# Patient Record
Sex: MALE | Race: WHITE | Employment: UNEMPLOYED | URBAN - METROPOLITAN AREA
[De-identification: names, ages, dates, MRNs, and addresses within clinical notes are randomized per-mention and may not be internally consistent; named-entity substitution may affect disease eponyms.]

---

## 2019-10-02 ENCOUNTER — OFFICE VISIT (OUTPATIENT)
Dept: ORTHOPEDIC SURGERY | Age: 49
End: 2019-10-02
Payer: COMMERCIAL

## 2019-10-02 VITALS
DIASTOLIC BLOOD PRESSURE: 89 MMHG | BODY MASS INDEX: 28.49 KG/M2 | WEIGHT: 199 LBS | HEIGHT: 70 IN | SYSTOLIC BLOOD PRESSURE: 129 MMHG | HEART RATE: 99 BPM

## 2019-10-02 DIAGNOSIS — M47.27 LUMBOSACRAL SPONDYLOSIS WITH RADICULOPATHY: Primary | ICD-10-CM

## 2019-10-02 DIAGNOSIS — S22.080A T12 COMPRESSION FRACTURE, INITIAL ENCOUNTER (HCC): ICD-10-CM

## 2019-10-02 PROCEDURE — G8427 DOCREV CUR MEDS BY ELIG CLIN: HCPCS | Performed by: PHYSICIAN ASSISTANT

## 2019-10-02 PROCEDURE — 4004F PT TOBACCO SCREEN RCVD TLK: CPT | Performed by: PHYSICIAN ASSISTANT

## 2019-10-02 PROCEDURE — 99203 OFFICE O/P NEW LOW 30 MIN: CPT | Performed by: PHYSICIAN ASSISTANT

## 2019-10-02 PROCEDURE — G8484 FLU IMMUNIZE NO ADMIN: HCPCS | Performed by: PHYSICIAN ASSISTANT

## 2019-10-02 PROCEDURE — G8419 CALC BMI OUT NRM PARAM NOF/U: HCPCS | Performed by: PHYSICIAN ASSISTANT

## 2019-10-14 ENCOUNTER — TELEPHONE (OUTPATIENT)
Dept: ORTHOPEDIC SURGERY | Age: 49
End: 2019-10-14

## 2019-10-14 DIAGNOSIS — M47.27 LUMBOSACRAL SPONDYLOSIS WITH RADICULOPATHY: Primary | ICD-10-CM

## 2020-08-18 ENCOUNTER — HOSPITAL ENCOUNTER (OUTPATIENT)
Dept: VASCULAR LAB | Age: 50
Discharge: HOME | End: 2020-08-18
Payer: COMMERCIAL

## 2020-08-18 PROCEDURE — 93971 EXTREMITY STUDY: CPT

## 2021-02-02 ENCOUNTER — OFFICE VISIT (OUTPATIENT)
Dept: PAIN MANAGEMENT | Age: 51
End: 2021-02-02
Payer: COMMERCIAL

## 2021-02-02 VITALS
DIASTOLIC BLOOD PRESSURE: 97 MMHG | WEIGHT: 225 LBS | TEMPERATURE: 97.8 F | HEIGHT: 70 IN | HEART RATE: 92 BPM | BODY MASS INDEX: 32.21 KG/M2 | OXYGEN SATURATION: 97 % | SYSTOLIC BLOOD PRESSURE: 152 MMHG | RESPIRATION RATE: 17 BRPM

## 2021-02-02 DIAGNOSIS — Z12.11 SCREEN FOR COLON CANCER: ICD-10-CM

## 2021-02-02 DIAGNOSIS — G89.29 CHRONIC BILATERAL LOW BACK PAIN WITH BILATERAL SCIATICA: ICD-10-CM

## 2021-02-02 DIAGNOSIS — Z51.81 ENCOUNTER FOR THERAPEUTIC DRUG MONITORING: ICD-10-CM

## 2021-02-02 DIAGNOSIS — M25.572 CHRONIC PAIN OF LEFT ANKLE: ICD-10-CM

## 2021-02-02 DIAGNOSIS — M54.41 CHRONIC BILATERAL LOW BACK PAIN WITH BILATERAL SCIATICA: ICD-10-CM

## 2021-02-02 DIAGNOSIS — M54.42 CHRONIC BILATERAL LOW BACK PAIN WITH BILATERAL SCIATICA: ICD-10-CM

## 2021-02-02 DIAGNOSIS — M54.2 CERVICALGIA: ICD-10-CM

## 2021-02-02 DIAGNOSIS — G89.29 CHRONIC PAIN OF LEFT ANKLE: ICD-10-CM

## 2021-02-02 DIAGNOSIS — G89.4 CHRONIC PAIN SYNDROME: ICD-10-CM

## 2021-02-02 DIAGNOSIS — S22.080S COMPRESSION FRACTURE OF T12 VERTEBRA, SEQUELA: ICD-10-CM

## 2021-02-02 DIAGNOSIS — M47.817 LUMBOSACRAL SPONDYLOSIS WITHOUT MYELOPATHY: ICD-10-CM

## 2021-02-02 LAB
A/G RATIO: 1.9 (ref 1.1–2.2)
ALBUMIN SERPL-MCNC: 5 G/DL (ref 3.4–5)
ALP BLD-CCNC: 75 U/L (ref 40–129)
ALT SERPL-CCNC: 45 U/L (ref 10–40)
ANION GAP SERPL CALCULATED.3IONS-SCNC: 13 MMOL/L (ref 3–16)
AST SERPL-CCNC: 33 U/L (ref 15–37)
BASOPHILS ABSOLUTE: 0.1 K/UL (ref 0–0.2)
BASOPHILS RELATIVE PERCENT: 0.7 %
BILIRUB SERPL-MCNC: 0.3 MG/DL (ref 0–1)
BUN BLDV-MCNC: 16 MG/DL (ref 7–20)
CALCIUM SERPL-MCNC: 9.9 MG/DL (ref 8.3–10.6)
CHLORIDE BLD-SCNC: 98 MMOL/L (ref 99–110)
CHOLESTEROL, TOTAL: 251 MG/DL (ref 0–199)
CO2: 24 MMOL/L (ref 21–32)
CREAT SERPL-MCNC: 0.9 MG/DL (ref 0.9–1.3)
EOSINOPHILS ABSOLUTE: 0.2 K/UL (ref 0–0.6)
EOSINOPHILS RELATIVE PERCENT: 2.5 %
FOLATE: 10.96 NG/ML (ref 4.78–24.2)
GFR AFRICAN AMERICAN: >60
GFR NON-AFRICAN AMERICAN: >60
GLOBULIN: 2.6 G/DL
GLUCOSE BLD-MCNC: 102 MG/DL (ref 70–99)
HCT VFR BLD CALC: 49.1 % (ref 40.5–52.5)
HDLC SERPL-MCNC: 38 MG/DL (ref 40–60)
HEMOGLOBIN: 16.8 G/DL (ref 13.5–17.5)
LDL CHOLESTEROL CALCULATED: ABNORMAL MG/DL
LDL CHOLESTEROL DIRECT: 139 MG/DL
LYMPHOCYTES ABSOLUTE: 2.6 K/UL (ref 1–5.1)
LYMPHOCYTES RELATIVE PERCENT: 29.4 %
MCH RBC QN AUTO: 31.3 PG (ref 26–34)
MCHC RBC AUTO-ENTMCNC: 34.2 G/DL (ref 31–36)
MCV RBC AUTO: 91.4 FL (ref 80–100)
MONOCYTES ABSOLUTE: 0.7 K/UL (ref 0–1.3)
MONOCYTES RELATIVE PERCENT: 7.8 %
NEUTROPHILS ABSOLUTE: 5.3 K/UL (ref 1.7–7.7)
NEUTROPHILS RELATIVE PERCENT: 59.6 %
PDW BLD-RTO: 12.7 % (ref 12.4–15.4)
PLATELET # BLD: 208 K/UL (ref 135–450)
PMV BLD AUTO: 7.9 FL (ref 5–10.5)
POTASSIUM SERPL-SCNC: 4.3 MMOL/L (ref 3.5–5.1)
RBC # BLD: 5.38 M/UL (ref 4.2–5.9)
SODIUM BLD-SCNC: 135 MMOL/L (ref 136–145)
TOTAL PROTEIN: 7.6 G/DL (ref 6.4–8.2)
TRIGL SERPL-MCNC: 440 MG/DL (ref 0–150)
VITAMIN B-12: 529 PG/ML (ref 211–911)
VITAMIN D 25-HYDROXY: 22 NG/ML
VLDLC SERPL CALC-MCNC: ABNORMAL MG/DL
WBC # BLD: 8.9 K/UL (ref 4–11)

## 2021-02-02 PROCEDURE — G8427 DOCREV CUR MEDS BY ELIG CLIN: HCPCS | Performed by: NURSE PRACTITIONER

## 2021-02-02 PROCEDURE — 99244 OFF/OP CNSLTJ NEW/EST MOD 40: CPT | Performed by: NURSE PRACTITIONER

## 2021-02-02 PROCEDURE — G8484 FLU IMMUNIZE NO ADMIN: HCPCS | Performed by: NURSE PRACTITIONER

## 2021-02-02 PROCEDURE — G8417 CALC BMI ABV UP PARAM F/U: HCPCS | Performed by: NURSE PRACTITIONER

## 2021-02-02 RX ORDER — OXCARBAZEPINE 150 MG/1
150 TABLET, FILM COATED ORAL 2 TIMES DAILY
Qty: 60 TABLET | Refills: 3 | Status: SHIPPED | OUTPATIENT
Start: 2021-02-02 | End: 2021-03-02

## 2021-02-02 NOTE — PATIENT INSTRUCTIONS
Patient Education        Back Stretches: Exercises  Introduction  Here are some examples of exercises for stretching your back. Start each exercise slowly. Ease off the exercise if you start to have pain. Your doctor or physical therapist will tell you when you can start these exercises and which ones will work best for you. How to do the exercises  Overhead stretch   1. Stand comfortably with your feet shoulder-width apart. 2. Looking straight ahead, raise both arms over your head and reach toward the ceiling. Do not allow your head to tilt back. 3. Hold for 15 to 30 seconds, then lower your arms to your sides. 4. Repeat 2 to 4 times. Side stretch   1. Stand comfortably with your feet shoulder-width apart. 2. Raise one arm over your head, and then lean to the other side. 3. Slide your hand down your leg as you let the weight of your arm gently stretch your side muscles. Hold for 15 to 30 seconds. 4. Repeat 2 to 4 times on each side. Press-up   1. Lie on your stomach, supporting your body with your forearms. 2. Press your elbows down into the floor to raise your upper back. As you do this, relax your stomach muscles and allow your back to arch without using your back muscles. As your press up, do not let your hips or pelvis come off the floor. 3. Hold for 15 to 30 seconds, then relax. 4. Repeat 2 to 4 times. Relax and rest   1. Lie on your back with a rolled towel under your neck and a pillow under your knees. Extend your arms comfortably to your sides. 2. Relax and breathe normally. 3. Remain in this position for about 10 minutes. 4. If you can, do this 2 or 3 times each day. Follow-up care is a key part of your treatment and safety. Be sure to make and go to all appointments, and call your doctor if you are having problems. It's also a good idea to know your test results and keep a list of the medicines you take. Where can you learn more? Shoulder blade squeeze   1. While standing, squeeze your shoulder blades together. 2. Do not raise your shoulders up as you are squeezing. 3. Hold for 6 seconds. 4. Repeat 8 to 12 times. Shoulder rolls   1. Sit comfortably with your feet shoulder-width apart. You can also do this exercise standing up. 2. Roll your shoulders up, then back, and then down in a smooth, circular motion. 3. Repeat 2 to 4 times. Follow-up care is a key part of your treatment and safety. Be sure to make and go to all appointments, and call your doctor if you are having problems. It's also a good idea to know your test results and keep a list of the medicines you take. Where can you learn more? Go to [email or web address]://[email or web address].[employer]. org and sign in to your [employer] account. Enter M991 in the [employer] box to learn more about \"Neck Arthritis: Exercises. \"     If you do not have an account, please click on the \"Sign Up Now\" link. Current as of: March 2, 2020               Content Version: 12.6  © 9058-7341 [employer]. Care instructions adapted under license by [facility] ([facility]). If you have questions about a medical condition or this instruction, always ask your healthcare professional. [address] any warranty or liability for your use of this information.
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
## 2021-02-03 LAB
ESTIMATED AVERAGE GLUCOSE: 114 MG/DL
HBA1C MFR BLD: 5.6 %

## 2021-02-08 ENCOUNTER — TELEPHONE (OUTPATIENT)
Dept: PAIN MANAGEMENT | Age: 51
End: 2021-02-08

## 2021-02-08 DIAGNOSIS — G89.29 CHRONIC PAIN OF LEFT ANKLE: ICD-10-CM

## 2021-02-08 DIAGNOSIS — M54.2 CERVICALGIA: ICD-10-CM

## 2021-02-08 DIAGNOSIS — M54.41 CHRONIC BILATERAL LOW BACK PAIN WITH BILATERAL SCIATICA: ICD-10-CM

## 2021-02-08 DIAGNOSIS — S22.080S COMPRESSION FRACTURE OF T12 VERTEBRA, SEQUELA: ICD-10-CM

## 2021-02-08 DIAGNOSIS — G89.29 CHRONIC BILATERAL LOW BACK PAIN WITH BILATERAL SCIATICA: ICD-10-CM

## 2021-02-08 DIAGNOSIS — G89.4 CHRONIC PAIN SYNDROME: Primary | ICD-10-CM

## 2021-02-08 DIAGNOSIS — M54.42 CHRONIC BILATERAL LOW BACK PAIN WITH BILATERAL SCIATICA: ICD-10-CM

## 2021-02-08 DIAGNOSIS — M47.817 LUMBOSACRAL SPONDYLOSIS WITHOUT MYELOPATHY: ICD-10-CM

## 2021-02-08 DIAGNOSIS — M25.572 CHRONIC PAIN OF LEFT ANKLE: ICD-10-CM

## 2021-02-08 RX ORDER — PREGABALIN 100 MG/1
100 CAPSULE ORAL NIGHTLY
Qty: 30 CAPSULE | Refills: 1 | Status: SHIPPED | OUTPATIENT
Start: 2021-02-08 | End: 2021-05-04 | Stop reason: SDUPTHER

## 2021-02-08 NOTE — TELEPHONE ENCOUNTER
Patient states RX OXcarbazepine (TRILEPTAL) 150 MG he received at his last visit has been causing severe headaches x 5 days.  Pl's advise

## 2021-02-08 NOTE — TELEPHONE ENCOUNTER
INFORMED PATIENT TO STOP THE TRILEPTEL AND TOLD HIM THAT I SENT DENYS TO THE PHARMACY FOR HIM TO TRY

## 2021-02-11 ENCOUNTER — TELEPHONE (OUTPATIENT)
Dept: PAIN MANAGEMENT | Age: 51
End: 2021-02-11

## 2021-02-11 DIAGNOSIS — S22.080S COMPRESSION FRACTURE OF T12 VERTEBRA, SEQUELA: ICD-10-CM

## 2021-02-11 DIAGNOSIS — M54.42 CHRONIC BILATERAL LOW BACK PAIN WITH BILATERAL SCIATICA: ICD-10-CM

## 2021-02-11 DIAGNOSIS — M54.41 CHRONIC BILATERAL LOW BACK PAIN WITH BILATERAL SCIATICA: ICD-10-CM

## 2021-02-11 DIAGNOSIS — M47.817 LUMBOSACRAL SPONDYLOSIS WITHOUT MYELOPATHY: ICD-10-CM

## 2021-02-11 DIAGNOSIS — G89.29 CHRONIC BILATERAL LOW BACK PAIN WITH BILATERAL SCIATICA: ICD-10-CM

## 2021-02-11 DIAGNOSIS — G89.4 CHRONIC PAIN SYNDROME: ICD-10-CM

## 2021-02-12 ENCOUNTER — TELEPHONE (OUTPATIENT)
Dept: PAIN MANAGEMENT | Age: 51
End: 2021-02-12

## 2021-02-12 NOTE — TELEPHONE ENCOUNTER
Patient states insurance will not cover RX Lidocaine 1.8 % PTCH also states Lyrica is not working.  Pl's advise

## 2021-02-17 ENCOUNTER — HOSPITAL ENCOUNTER (OUTPATIENT)
Dept: MRI IMAGING | Age: 51
Discharge: HOME | End: 2021-02-17
Payer: COMMERCIAL

## 2021-02-17 DIAGNOSIS — M54.41 CHRONIC BILATERAL LOW BACK PAIN WITH BILATERAL SCIATICA: ICD-10-CM

## 2021-02-17 DIAGNOSIS — G89.4 CHRONIC PAIN SYNDROME: ICD-10-CM

## 2021-02-17 DIAGNOSIS — M54.42 CHRONIC BILATERAL LOW BACK PAIN WITH BILATERAL SCIATICA: ICD-10-CM

## 2021-02-17 DIAGNOSIS — G89.29 CHRONIC BILATERAL LOW BACK PAIN WITH BILATERAL SCIATICA: ICD-10-CM

## 2021-02-17 DIAGNOSIS — S22.080S COMPRESSION FRACTURE OF T12 VERTEBRA, SEQUELA: ICD-10-CM

## 2021-02-17 DIAGNOSIS — M47.817 LUMBOSACRAL SPONDYLOSIS WITHOUT MYELOPATHY: ICD-10-CM

## 2021-02-17 PROCEDURE — 72146 MRI CHEST SPINE W/O DYE: CPT

## 2021-02-17 RX ORDER — LIDOCAINE 50 MG/G
1 PATCH TOPICAL DAILY
Qty: 10 PATCH | Refills: 0 | Status: SHIPPED | OUTPATIENT
Start: 2021-02-17 | End: 2021-02-27

## 2021-02-17 NOTE — TELEPHONE ENCOUNTER
Resent 5% patches to see if they would cover them, and informed him that any changes in meds would be able to be discussed at his next appt

## 2021-02-19 ENCOUNTER — TELEPHONE (OUTPATIENT)
Dept: PAIN MANAGEMENT | Age: 51
End: 2021-02-19

## 2021-02-19 DIAGNOSIS — G89.4 CHRONIC PAIN SYNDROME: Primary | ICD-10-CM

## 2021-03-01 ENCOUNTER — TELEPHONE (OUTPATIENT)
Dept: PAIN MANAGEMENT | Age: 51
End: 2021-03-01

## 2021-03-01 DIAGNOSIS — G89.4 CHRONIC PAIN SYNDROME: ICD-10-CM

## 2021-03-01 RX ORDER — BUPRENORPHINE 10 UG/H
1 PATCH TRANSDERMAL WEEKLY
Qty: 4 PATCH | Refills: 0 | OUTPATIENT
Start: 2021-03-01 | End: 2021-03-02 | Stop reason: SDUPTHER

## 2021-03-01 NOTE — TELEPHONE ENCOUNTER
Per PKA patient will be switch to Butrans 10 mcg. Medication was called into the pharmacy. Patient was called and made aware.

## 2021-03-01 NOTE — TELEPHONE ENCOUNTER
1 [facility] patients RX buprenorphine (BUTRANS) 10 MCG/HR PTWK is not available at [facility] and ask Hamzah to call and request RX. Obed Nicholson.  Pl's advise

## 2021-03-01 NOTE — TELEPHONE ENCOUNTER
Submitted PA for BUPRENORPHINE PATCH  Via CMM Key: [id number] STATUS: APPROVED. The patient was notified by phone.

## 2021-03-02 ENCOUNTER — VIRTUAL VISIT (OUTPATIENT)
Dept: PAIN MANAGEMENT | Age: 51
End: 2021-03-02
Payer: COMMERCIAL

## 2021-03-02 DIAGNOSIS — G89.4 CHRONIC PAIN SYNDROME: ICD-10-CM

## 2021-03-02 DIAGNOSIS — M54.41 CHRONIC BILATERAL LOW BACK PAIN WITH BILATERAL SCIATICA: ICD-10-CM

## 2021-03-02 DIAGNOSIS — M47.817 LUMBOSACRAL SPONDYLOSIS WITHOUT MYELOPATHY: ICD-10-CM

## 2021-03-02 DIAGNOSIS — M62.830 BACK SPASM: ICD-10-CM

## 2021-03-02 DIAGNOSIS — S22.080S COMPRESSION FRACTURE OF T12 VERTEBRA, SEQUELA: ICD-10-CM

## 2021-03-02 DIAGNOSIS — Z51.81 ENCOUNTER FOR THERAPEUTIC DRUG MONITORING: ICD-10-CM

## 2021-03-02 DIAGNOSIS — M54.42 CHRONIC BILATERAL LOW BACK PAIN WITH BILATERAL SCIATICA: ICD-10-CM

## 2021-03-02 DIAGNOSIS — M25.572 CHRONIC PAIN OF LEFT ANKLE: ICD-10-CM

## 2021-03-02 DIAGNOSIS — M54.2 CERVICALGIA: ICD-10-CM

## 2021-03-02 DIAGNOSIS — G89.29 CHRONIC PAIN OF LEFT ANKLE: ICD-10-CM

## 2021-03-02 DIAGNOSIS — G89.29 CHRONIC BILATERAL LOW BACK PAIN WITH BILATERAL SCIATICA: ICD-10-CM

## 2021-03-02 PROCEDURE — 3017F COLORECTAL CA SCREEN DOC REV: CPT | Performed by: NURSE PRACTITIONER

## 2021-03-02 PROCEDURE — 99213 OFFICE O/P EST LOW 20 MIN: CPT | Performed by: NURSE PRACTITIONER

## 2021-03-02 PROCEDURE — G8427 DOCREV CUR MEDS BY ELIG CLIN: HCPCS | Performed by: NURSE PRACTITIONER

## 2021-03-02 RX ORDER — TIZANIDINE 4 MG/1
4 TABLET ORAL 2 TIMES DAILY
Qty: 60 TABLET | Refills: 1 | Status: SHIPPED | OUTPATIENT
Start: 2021-03-02 | End: 2021-05-06 | Stop reason: SDUPTHER

## 2021-03-02 RX ORDER — BUPRENORPHINE 10 UG/H
1 PATCH TRANSDERMAL WEEKLY
Qty: 4 PATCH | Refills: 0 | OUTPATIENT
Start: 2021-03-02 | End: 2021-03-30

## 2021-03-02 NOTE — TELEPHONE ENCOUNTER
[facility] Patient is aware and stated understanding.

## 2021-03-02 NOTE — PROGRESS NOTES
TELE HEALTH VISIT (AUDIO-VISUAL)    Pursuant to the emergency declaration under the [facility], [facility] waiver authority and the [employer] and [employer] Act, this Virtual  Visit was conducted, with patient's consent, to reduce the patient's risk of exposure to COVID-19 and provide continuity of care for an established patient. Service is  provided through a video synchronous discussion virtually to substitute for in-person clinic visit. Due to this being a TeleHealth encounter (During YNZUA-96 public health emergency), evaluation of the following organ systems was limited: Vitals/Constitutional/EENT/Resp/CV/GI//MS/Neuro/Skin/Taks-Mknl-Sbl. Nuha Colby III  1970  [id number]    Mr. Carli Baires is being seen virtually for a follow up visit using Doxy. me/[employer] Video visit/[employer] or face time  Informed verbal consent to the virtual visit was obtained from Mr. Carli Baires. Risks associated with HIPPA compliance with the virtual visit was explained to the patient. Mr. Carli Baires is at his home and Malcolm Echols [place] MARGARITACNP is in her office. HISTORY OF PRESENT ILLNESS:  Mr. Carli Baires is a 48 y.o. male  being assessed for a follow up visit for pain management for evaluation of ongoing care regarding his symptoms and monitoring of compliance with long term use high risk medications. He has a diagnosis of   1. Chronic pain syndrome    2. Chronic bilateral low back pain with bilateral sciatica    3. Compression fracture of T12 vertebra, sequela    4. Lumbosacral spondylosis without myelopathy    5. Back spasm    6. Chronic pain of left ankle    7. Cervicalgia    8.  Encounter for therapeutic drug monitoring      On the Patients Pain Assessment form reviewed with the Medical Assistant: He complains of pain in the bilateral lower back and neck  with radiation to the upper leg Bilateral, knees Bilateral, lower leg Bilateral and feet Bilateral . He rates the pain 8/10 and describes it as aching, squeezing, numbness, tingling, pins and needles. Current treatment regimen has helped relieve about 0% of the pain. He denies any side effects from the current pain regimen. Patient reports that since the last follow up visit the physical functioning is unchanged, family/social relationships are unchanged, mood is unchanged sleep patterns are unchanged, and that the overall functioning is unchanged. Patient denies misusing/abusing his narcotic pain medications or using any illegal drugs. Upon obtaining medical history from Mr. Gary Villagomez states that pain is not manageable on current pain therapy. He still has pain to the mid-lower back. He has not yet collected the pain patch from the pharmacy, tolerating the Lyrica better that the Trileptal. Mood is stable without anxiety. Sleep is fair with an average of 5-6 hours. Denies to having issues of constipation. Tolerating activities/house chores with moderate tenderness to the mid-lower back. ALLERGIES: Patients list of allergies were reviewed     MEDICATIONS: Mr. Gary Villagomez list of medications were reviewed. His current medications are   Outpatient Medications Prior to Visit   Medication Sig Dispense Refill    buprenorphine (BUTRANS) 10 MCG/HR PTWK Place 1 patch onto the skin once a week for 30 days. 4 patch 0    pregabalin (LYRICA) 100 MG capsule Take 1 capsule by mouth nightly for 30 days.  30 capsule 1    aspirin EC 81 MG EC tablet Take 1 tablet by mouth daily 30 tablet 5    ibuprofen (ADVIL;MOTRIN) 800 MG tablet Take 1 tablet by mouth every 8 hours as needed for Pain 90 tablet 3    Lidocaine 1.8 % PTCH Apply 1 patch topically daily 30 patch 0    OXcarbazepine (TRILEPTAL) 150 MG tablet Take 1 tablet by mouth 2 times daily 60 tablet 3  pregabalin (LYRICA) 50 MG capsule Take 1 capsule by mouth 2 times daily for 30 days. 60 capsule 2     No facility-administered medications prior to visit. SOCIAL/FAMILY/PAST MEDICAL HISTORY: Mr. Vani Cuenca, family and past medical history was reviewed. REVIEW OF SYSTEMS:    Respiratory: Negative for apnea, chest tightness and shortness of breath or change in baseline breathing. Gastrointestinal: Negative for nausea, vomiting, abdominal pain, diarrhea, constipation, blood in stool and abdominal distention. PHYSICAL EXAM:   Nursing note and vitals reviewed. There were no vitals taken for this visit. as per patient  Constitutional: He appears well-developed and well-nourished. No acute distress. Skin: Skin appears to be warm and dry. No rashes or any other marks noted. He is not diaphoretic. Neurological/Psychiatric:He is alert and oriented to person, place, and time. Coordination is  normal.  His mood isAppropriate and affect is Neutral/Euthymic(normal). His behavior is normal.   thought content normal.   Musculoskeletal / Extremities: Not assessed    MRI of the Thoracic spine 2/17/21:  Compression fracture at T12 is remote/chronic.  No acute compression fracture.       Mild degenerate change.       Question abnormal increased signal involving the ventral cervical cord.  This   may be artifact.  Consider further evaluation with MRI cervical spine if this   may change patient management. IMPRESSION:   1. Chronic pain syndrome    2. Chronic bilateral low back pain with bilateral sciatica    3. Compression fracture of T12 vertebra, sequela    4. Lumbosacral spondylosis without myelopathy    5. Back spasm    6. Chronic pain of left ankle    7. Cervicalgia    8.  Encounter for therapeutic drug monitoring        PLAN:  Informed verbal consent regarding treatment was obtained  -Continue with Ajith Tierney -Zanaflex 4 mg bid, He will collect Butrans patches today, review on medications completed, s/e discussed  -He is yet to complete PT  -Reviewed MRI of the Thoracic spine with the patient. He is scheduled for MRI of the Lumbar spine Friday, Xray of the ankle/cervical spine pending  -CBT techniques- relaxation therapies such as biofeedback, mindfulness based stress reduction, imagery, cognitive restructuring, problem solving discussed with patient  -Last UDS 2/2/21 Consistent  -Return in about 4 weeks (around 3/30/2021). Current Outpatient Medications   Medication Sig Dispense Refill    tiZANidine (ZANAFLEX) 4 MG tablet Take 1 tablet by mouth 2 times daily 60 tablet 1    Lidocaine 1.8 % PTCH Apply 1 patch topically daily 30 patch 0    pregabalin (LYRICA) 100 MG capsule Take 1 capsule by mouth nightly for 30 days. 30 capsule 1    aspirin EC 81 MG EC tablet Take 1 tablet by mouth daily 30 tablet 5    ibuprofen (ADVIL;MOTRIN) 800 MG tablet Take 1 tablet by mouth every 8 hours as needed for Pain 90 tablet 3    methylPREDNISolone (MEDROL, CAL,) 4 MG tablet Use as directed 1 kit 0    buprenorphine (BUTRANS) 10 MCG/HR PTWK Place 1 patch onto the skin once a week for 30 days. 4 patch 0     No current facility-administered medications for this visit. I will continue his current medication regimen  which is part of the above treatment schedule. It has been helping with Mr. Priscila Monreal chronic  medical problems which for this visit include:   Diagnoses of Chronic pain syndrome, Chronic bilateral low back pain with bilateral sciatica, Compression fracture of T12 vertebra, sequela, Lumbosacral spondylosis without myelopathy, Back spasm, Chronic pain of left ankle, Cervicalgia, and Encounter for therapeutic drug monitoring were pertinent to this visit. Risks and benefits of the medications and other alternative treatments  including no treatment were discussed with the patient. The common side effects of these medications were also explained to the patient. Informed verbal consent was obtained. Goals of current treatment regimen include improvement in pain, restoration of functioning- with focus on improvement in physical performance, general activity, work or disability,emotional distress, health care utilization and  decreased medication consumption. Will continue to monitor progress towards achieving/maintaining therapeutic goals with special emphasis on  1. Improvement in perceived interfernce  of pain with ADL's. Ability to do home exercises independently. Ability to do household chores indoor and/or outdoor work and social and leisure activities. Improve psychosocial and physical functioning. - he is not showing any significant progress/or showing regression  towards this goal and reassessment and adjustment of goals/treatment have been made. He was advised against drinking alcohol with the narcotic pain medicines, advised against driving or handling machinery while adjusting the dose of medicines or if having cognitive  issues related to the current medications. Risk of overdose and death, if medicines not taken as prescribed, were also discussed. If the patient develops new symptoms or if the symptoms worsen, the patient should call the office. While transcribing every attempt was made to maintain the accuracy of the note in terms of it's contents,there may have been some errors made inadvertently. Thank you for allowing me to participate in the care of this patient. Flor Garcia.  Jacqueline AVILA-FUNMI     Cc: Liz Gerber MD

## 2021-03-02 NOTE — PATIENT INSTRUCTIONS
Patient Education        Back Stretches: Exercises  Introduction  Here are some examples of exercises for stretching your back. Start each exercise slowly. Ease off the exercise if you start to have pain. Your doctor or physical therapist will tell you when you can start these exercises and which ones will work best for you. How to do the exercises  Overhead stretch   1. Stand comfortably with your feet shoulder-width apart. 2. Looking straight ahead, raise both arms over your head and reach toward the ceiling. Do not allow your head to tilt back. 3. Hold for 15 to 30 seconds, then lower your arms to your sides. 4. Repeat 2 to 4 times. Side stretch   1. Stand comfortably with your feet shoulder-width apart. 2. Raise one arm over your head, and then lean to the other side. 3. Slide your hand down your leg as you let the weight of your arm gently stretch your side muscles. Hold for 15 to 30 seconds. 4. Repeat 2 to 4 times on each side. Press-up   1. Lie on your stomach, supporting your body with your forearms. 2. Press your elbows down into the floor to raise your upper back. As you do this, relax your stomach muscles and allow your back to arch without using your back muscles. As your press up, do not let your hips or pelvis come off the floor. 3. Hold for 15 to 30 seconds, then relax. 4. Repeat 2 to 4 times. Relax and rest   1. Lie on your back with a rolled towel under your neck and a pillow under your knees. Extend your arms comfortably to your sides. 2. Relax and breathe normally. 3. Remain in this position for about 10 minutes. 4. If you can, do this 2 or 3 times each day. Follow-up care is a key part of your treatment and safety. Be sure to make and go to all appointments, and call your doctor if you are having problems. It's also a good idea to know your test results and keep a list of the medicines you take. Where can you learn more? Go to [email or web address] and sign in to your BuyerCurioushart account. Enter F296 in the Health Informaticshire box to learn more about \"Back Stretches: Exercises. \"     If you do not have an account, please click on the \"Sign Up Now\" link. Current as of: March 2, 2020               Content Version: 12.6  © 8525-6942 [employer], [employer]orporated. Care instructions adapted under license by [facility] ([facility]). If you have questions about a medical condition or this instruction, always ask your healthcare professional. [address] any warranty or liability for your use of this information.

## 2021-03-05 RX ORDER — METHYLPREDNISOLONE 4 MG/1
TABLET ORAL
Qty: 1 KIT | Refills: 0 | Status: ON HOLD | OUTPATIENT
Start: 2021-03-05 | End: 2021-11-10

## 2021-03-05 NOTE — TELEPHONE ENCOUNTER
Patient called stating he's been wearing the buprenorphine (BUTRANS) 10 MCG/HR [address] since his last appt. He said it's not working, he's not having any releif.       He is also asking for a steroid pack, it worked for him in the past.    Please advise [phone]

## 2021-03-12 ENCOUNTER — TELEPHONE (OUTPATIENT)
Dept: PAIN MANAGEMENT | Age: 51
End: 2021-03-12

## 2021-03-25 NOTE — TELEPHONE ENCOUNTER
Patient calling regarding previous message and said he called called [facility] and they do not have Rx for steroid pack.  Pl's advise patient

## 2021-03-30 ENCOUNTER — VIRTUAL VISIT (OUTPATIENT)
Dept: PAIN MANAGEMENT | Age: 51
End: 2021-03-30
Payer: COMMERCIAL

## 2021-03-30 DIAGNOSIS — M25.572 CHRONIC PAIN OF LEFT ANKLE: ICD-10-CM

## 2021-03-30 DIAGNOSIS — G89.29 CHRONIC BILATERAL LOW BACK PAIN WITH BILATERAL SCIATICA: ICD-10-CM

## 2021-03-30 DIAGNOSIS — M62.830 BACK SPASM: ICD-10-CM

## 2021-03-30 DIAGNOSIS — M54.41 CHRONIC BILATERAL LOW BACK PAIN WITH BILATERAL SCIATICA: ICD-10-CM

## 2021-03-30 DIAGNOSIS — M54.42 CHRONIC BILATERAL LOW BACK PAIN WITH BILATERAL SCIATICA: ICD-10-CM

## 2021-03-30 DIAGNOSIS — S22.080S COMPRESSION FRACTURE OF T12 VERTEBRA, SEQUELA: ICD-10-CM

## 2021-03-30 DIAGNOSIS — M47.817 LUMBOSACRAL SPONDYLOSIS WITHOUT MYELOPATHY: ICD-10-CM

## 2021-03-30 DIAGNOSIS — G89.29 CHRONIC PAIN OF LEFT ANKLE: ICD-10-CM

## 2021-03-30 DIAGNOSIS — G89.4 CHRONIC PAIN SYNDROME: ICD-10-CM

## 2021-03-30 DIAGNOSIS — M54.2 CERVICALGIA: ICD-10-CM

## 2021-03-30 DIAGNOSIS — Z51.81 ENCOUNTER FOR THERAPEUTIC DRUG MONITORING: Primary | ICD-10-CM

## 2021-03-30 PROCEDURE — 3017F COLORECTAL CA SCREEN DOC REV: CPT | Performed by: NURSE PRACTITIONER

## 2021-03-30 PROCEDURE — 99213 OFFICE O/P EST LOW 20 MIN: CPT | Performed by: NURSE PRACTITIONER

## 2021-03-30 PROCEDURE — G8427 DOCREV CUR MEDS BY ELIG CLIN: HCPCS | Performed by: NURSE PRACTITIONER

## 2021-03-30 RX ORDER — HYDROCODONE BITARTRATE 20 MG/1
20 TABLET, EXTENDED RELEASE ORAL EVERY 24 HOURS
Qty: 30 TABLET | Refills: 0 | Status: SHIPPED | OUTPATIENT
Start: 2021-03-30 | End: 2021-05-06

## 2021-03-30 NOTE — PATIENT INSTRUCTIONS
Patient Education        Back Stretches: Exercises  Introduction  Here are some examples of exercises for stretching your back. Start each exercise slowly. Ease off the exercise if you start to have pain. Your doctor or physical therapist will tell you when you can start these exercises and which ones will work best for you. How to do the exercises  Overhead stretch   1. Stand comfortably with your feet shoulder-width apart. 2. Looking straight ahead, raise both arms over your head and reach toward the ceiling. Do not allow your head to tilt back. 3. Hold for 15 to 30 seconds, then lower your arms to your sides. 4. Repeat 2 to 4 times. Side stretch   1. Stand comfortably with your feet shoulder-width apart. 2. Raise one arm over your head, and then lean to the other side. 3. Slide your hand down your leg as you let the weight of your arm gently stretch your side muscles. Hold for 15 to 30 seconds. 4. Repeat 2 to 4 times on each side. Press-up   1. Lie on your stomach, supporting your body with your forearms. 2. Press your elbows down into the floor to raise your upper back. As you do this, relax your stomach muscles and allow your back to arch without using your back muscles. As your press up, do not let your hips or pelvis come off the floor. 3. Hold for 15 to 30 seconds, then relax. 4. Repeat 2 to 4 times. Relax and rest   1. Lie on your back with a rolled towel under your neck and a pillow under your knees. Extend your arms comfortably to your sides. 2. Relax and breathe normally. 3. Remain in this position for about 10 minutes. 4. If you can, do this 2 or 3 times each day. Follow-up care is a key part of your treatment and safety. Be sure to make and go to all appointments, and call your doctor if you are having problems. It's also a good idea to know your test results and keep a list of the medicines you take. Where can you learn more? Go to [email or web address]

## 2021-03-30 NOTE — PROGRESS NOTES
TELE HEALTH VISIT (AUDIO-VISUAL)    Pursuant to the emergency declaration under the [facility] waiver authority and the [employer] Act, this Virtual  Visit was conducted, with patient's consent, to reduce the patient's risk of exposure to COVID-19 and provide continuity of care for an established patient. Service is  provided through a video synchronous discussion virtually to substitute for in-person clinic visit. Due to this being a TeleHealth encounter (During AEWWN-79 public health emergency), evaluation of the following organ systems was limited: Vitals/Constitutional/EENT/Resp/CV/GI//MS/Neuro/Skin/Efmp-Fykg-Qks. Heavenly Northern III  1970  [id number]    Mr. Cam Rehman is being seen virtually for a follow up visit using Doxy. me/A2Zlogix Video visit/Dafitio or face time  Informed verbal consent to the virtual visit was obtained from Mr. Cam Rehman. Risks associated with HIPPA compliance with the virtual visit was explained to the patient. Mr. Cam Rehman is at his home and Katelyn NAVARRO is in her office. HISTORY OF PRESENT ILLNESS:  Mr. Cam Rehman is a 46 y.o. male  being assessed for a follow up visit for pain management for evaluation of ongoing care regarding his symptoms and monitoring of compliance with long term use high risk medications. He has a diagnosis of   1. Chronic pain syndrome    2. Chronic bilateral low back pain with bilateral sciatica    3. Compression fracture of T12 vertebra, sequela    4. Lumbosacral spondylosis without myelopathy    5. Back spasm    6. Chronic pain of left ankle    7. Cervicalgia      On the Patients Pain Assessment form reviewed with the Medical Assistant:  He complains of pain in the bilateral lower back  with radiation to the feet Bilateral and also c/o pain in the neck. He rates the pain 10/10 and describes it as aching.  Current treatment regimen has helped relieve about 0% of the pain. He denies any side effects from the current pain regimen. Patient reports that since the last follow up visit the physical functioning is unchanged, family/social relationships are unchanged, mood is unchanged sleep patterns are unchanged, and that the overall functioning is unchanged. Patient denies misusing/abusing his narcotic pain medications or using any illegal drugs. Upon obtaining medical history from Mr. Osbaldo Quiroga states that pain is not manageable on current pain therapy. Reports pain is bothersome in most of the joints. Took the Butrans patch off last week as it caused side effects of SOB/dyspnea. Mood is stable low due to pain. Sleep is fair with an average of 5-6 hours. Denies to having issues of constipation. Tolerating activities/house chores with moderate tenderness to the lower back. ALLERGIES: Patients list of allergies were reviewed     MEDICATIONS: Mr. Osbaldo Quiroga list of medications were reviewed. His current medications are   Outpatient Medications Prior to Visit   Medication Sig Dispense Refill    methylPREDNISolone (MEDROL, CAL,) 4 MG tablet Use as directed 1 kit 0    tiZANidine (ZANAFLEX) 4 MG tablet Take 1 tablet by mouth 2 times daily 60 tablet 1    Lidocaine 1.8 % PTCH Apply 1 patch topically daily 30 patch 0    aspirin EC 81 MG EC tablet Take 1 tablet by mouth daily 30 tablet 5    ibuprofen (ADVIL;MOTRIN) 800 MG tablet Take 1 tablet by mouth every 8 hours as needed for Pain 90 tablet 3    buprenorphine (BUTRANS) 10 MCG/HR PTWK Place 1 patch onto the skin once a week for 30 days. 4 patch 0    pregabalin (LYRICA) 100 MG capsule Take 1 capsule by mouth nightly for 30 days. 30 capsule 1     No facility-administered medications prior to visit. SOCIAL/FAMILY/PAST MEDICAL HISTORY: Mr. Solange Holcomb, family and past medical history was reviewed.      REVIEW OF SYSTEMS:    Respiratory: Negative for apnea, chest tightness and shortness of breath or change in baseline breathing. Gastrointestinal: Negative for nausea, vomiting, abdominal pain, diarrhea, constipation, blood in stool and abdominal distention. PHYSICAL EXAM:   Nursing note and vitals reviewed. There were no vitals taken for this visit. as per patient  Constitutional: He appears well-developed and well-nourished. No acute distress. Skin: Skin appears to be warm and dry. No rashes or any other marks noted. He is not diaphoretic. Neurological/Psychiatric:He is alert and oriented to person, place, and time. Coordination is  normal.  His mood isAppropriate and affect is Neutral/Euthymic(normal). His behavior is normal.   thought content normal.   Musculoskeletal / Extremities: Not assessed    IMPRESSION:   1. Chronic pain syndrome    2. Chronic bilateral low back pain with bilateral sciatica    3. Compression fracture of T12 vertebra, sequela    4. Lumbosacral spondylosis without myelopathy    5. Back spasm    6. Chronic pain of left ankle    7. Cervicalgia        PLAN:  Informed verbal consent regarding treatment was obtained  -Continue with Lyrica, ZTlido, Zanaflex  -Hysingla 20 mg daily  -Kristie exercises/back stretches  -CBT techniques- relaxation therapies such as biofeedback, mindfulness based stress reduction, imagery, cognitive restructuring, problem solving discussed with patient  -Last UDS 3/30/21 Consistent  -Return in about 4 weeks (around 4/27/2021). Current Outpatient Medications   Medication Sig Dispense Refill    HYDROcodone (HYSINGLA ER) 20 MG extended release tablet Take 20 mg by mouth every 24 hours for 30 days.  30 tablet 0    methylPREDNISolone (MEDROL, CAL,) 4 MG tablet Use as directed 1 kit 0    aspirin EC 81 MG EC tablet Take 1 tablet by mouth daily 30 tablet 5    ibuprofen (ADVIL;MOTRIN) 800 MG tablet Take 1 tablet by mouth every 8 hours as needed for Pain 90 tablet 3    pregabalin (LYRICA) 100 MG capsule Take 1 capsule by mouth nightly for 30 days. 30 capsule 1     No current facility-administered medications for this visit. I will continue his current medication regimen  which is part of the above treatment schedule. It has been helping with Mr. John Funk chronic  medical problems which for this visit include:   Diagnoses of Chronic pain syndrome, Chronic bilateral low back pain with bilateral sciatica, Compression fracture of T12 vertebra, sequela, Lumbosacral spondylosis without myelopathy, Back spasm, Chronic pain of left ankle, and Cervicalgia were pertinent to this visit. Risks and benefits of the medications and other alternative treatments  including no treatment were discussed with the patient. The common side effects of these medications were also explained to the patient. Informed verbal consent was obtained. Goals of current treatment regimen include improvement in pain, restoration of functioning- with focus on improvement in physical performance, general activity, work or disability,emotional distress, health care utilization and  decreased medication consumption. Will continue to monitor progress towards achieving/maintaining therapeutic goals with special emphasis on  1. Improvement in perceived interfernce  of pain with ADL's. Ability to do home exercises independently. Ability to do household chores indoor and/or outdoor work and social and leisure activities. Improve psychosocial and physical functioning. - he is showing progression towards this treatment goal with the current regimen. He was advised against drinking alcohol with the narcotic pain medicines, advised against driving or handling machinery while adjusting the dose of medicines or if having cognitive  issues related to the current medications. Risk of overdose and death, if medicines not taken as prescribed, were also discussed. If the patient develops new symptoms or if the symptoms worsen, the patient should call the office.     While transcribing every attempt was made to maintain the accuracy of the note in terms of it's contents,there may have been some errors made inadvertently. Thank you for allowing me to participate in the care of this patient. Nereida Bermeo.  Anat AVILA-FUNMI     Cc: David Gatica MD

## 2021-04-01 ENCOUNTER — TELEPHONE (OUTPATIENT)
Dept: PAIN MANAGEMENT | Age: 51
End: 2021-04-01

## 2021-04-12 ENCOUNTER — TELEPHONE (OUTPATIENT)
Dept: PAIN MANAGEMENT | Age: 51
End: 2021-04-12

## 2021-05-04 ENCOUNTER — TELEPHONE (OUTPATIENT)
Dept: PAIN MANAGEMENT | Age: 51
End: 2021-05-04

## 2021-05-04 DIAGNOSIS — M54.42 CHRONIC BILATERAL LOW BACK PAIN WITH BILATERAL SCIATICA: ICD-10-CM

## 2021-05-04 DIAGNOSIS — M54.2 CERVICALGIA: ICD-10-CM

## 2021-05-04 DIAGNOSIS — G89.29 CHRONIC PAIN OF LEFT ANKLE: ICD-10-CM

## 2021-05-04 DIAGNOSIS — M54.41 CHRONIC BILATERAL LOW BACK PAIN WITH BILATERAL SCIATICA: ICD-10-CM

## 2021-05-04 DIAGNOSIS — M47.817 LUMBOSACRAL SPONDYLOSIS WITHOUT MYELOPATHY: ICD-10-CM

## 2021-05-04 DIAGNOSIS — M25.572 CHRONIC PAIN OF LEFT ANKLE: ICD-10-CM

## 2021-05-04 DIAGNOSIS — G89.4 CHRONIC PAIN SYNDROME: ICD-10-CM

## 2021-05-04 DIAGNOSIS — S22.080S COMPRESSION FRACTURE OF T12 VERTEBRA, SEQUELA: ICD-10-CM

## 2021-05-04 DIAGNOSIS — G89.29 CHRONIC BILATERAL LOW BACK PAIN WITH BILATERAL SCIATICA: ICD-10-CM

## 2021-05-04 DIAGNOSIS — G89.4 CHRONIC PAIN SYNDROME: Primary | ICD-10-CM

## 2021-05-04 RX ORDER — PREGABALIN 100 MG/1
100 CAPSULE ORAL NIGHTLY
Qty: 30 CAPSULE | Refills: 0 | Status: ON HOLD | OUTPATIENT
Start: 2021-05-04 | End: 2021-11-10

## 2021-05-04 NOTE — TELEPHONE ENCOUNTER
Patient called stating his referral for MRI Lumbar spine has . He is asking for another one to be sent over.     Please advise

## 2021-05-04 NOTE — TELEPHONE ENCOUNTER
Called Rx into [place] [facility]. Try calling patient to let him know, but was unable to reach patient and unable to leave a voice message as voice mail box is full. Also patients MRI of Lumbar of spine was order. There was to phone calls on patient.

## 2021-05-06 ENCOUNTER — TELEPHONE (OUTPATIENT)
Dept: PAIN MANAGEMENT | Age: 51
End: 2021-05-06

## 2021-05-06 ENCOUNTER — VIRTUAL VISIT (OUTPATIENT)
Dept: PAIN MANAGEMENT | Age: 51
End: 2021-05-06
Payer: COMMERCIAL

## 2021-05-06 DIAGNOSIS — G89.29 CHRONIC BILATERAL LOW BACK PAIN WITH BILATERAL SCIATICA: ICD-10-CM

## 2021-05-06 DIAGNOSIS — M25.572 CHRONIC PAIN OF LEFT ANKLE: ICD-10-CM

## 2021-05-06 DIAGNOSIS — M54.42 CHRONIC BILATERAL LOW BACK PAIN WITH BILATERAL SCIATICA: ICD-10-CM

## 2021-05-06 DIAGNOSIS — M62.830 BACK SPASM: ICD-10-CM

## 2021-05-06 DIAGNOSIS — M54.2 CERVICALGIA: ICD-10-CM

## 2021-05-06 DIAGNOSIS — S22.080S COMPRESSION FRACTURE OF T12 VERTEBRA, SEQUELA: ICD-10-CM

## 2021-05-06 DIAGNOSIS — G89.29 CHRONIC PAIN OF LEFT ANKLE: ICD-10-CM

## 2021-05-06 DIAGNOSIS — M47.817 LUMBOSACRAL SPONDYLOSIS WITHOUT MYELOPATHY: ICD-10-CM

## 2021-05-06 DIAGNOSIS — G89.4 CHRONIC PAIN SYNDROME: ICD-10-CM

## 2021-05-06 DIAGNOSIS — M54.41 CHRONIC BILATERAL LOW BACK PAIN WITH BILATERAL SCIATICA: ICD-10-CM

## 2021-05-06 PROCEDURE — 99213 OFFICE O/P EST LOW 20 MIN: CPT | Performed by: NURSE PRACTITIONER

## 2021-05-06 PROCEDURE — 3017F COLORECTAL CA SCREEN DOC REV: CPT | Performed by: NURSE PRACTITIONER

## 2021-05-06 PROCEDURE — G8427 DOCREV CUR MEDS BY ELIG CLIN: HCPCS | Performed by: NURSE PRACTITIONER

## 2021-05-06 RX ORDER — TIZANIDINE 4 MG/1
4 TABLET ORAL 2 TIMES DAILY
Qty: 60 TABLET | Refills: 1 | Status: SHIPPED | OUTPATIENT
Start: 2021-05-06 | End: 2021-06-04 | Stop reason: SDUPTHER

## 2021-05-06 NOTE — PATIENT INSTRUCTIONS
and sign in to your Conisus account. Enter T533 in the Logicalware box to learn more about \"Back Stretches: Exercises. \"     If you do not have an account, please click on the \"Sign Up Now\" link. Current as of: November 16, 2020               Content Version: 12.8  © 1854-0814 Healthwise, Incorporated. Care instructions adapted under license by [facility] ([facility]). If you have questions about a medical condition or this instruction, always ask your healthcare professional. [address] any warranty or liability for your use of this information.

## 2021-05-06 NOTE — TELEPHONE ENCOUNTER
Submitted PA for [employer]  Via CM Key: [id number] STATUS: \"Approved; Review Type:Prior Auth; Coverage Start Date:04/06/2021; Coverage End Date:08/06/2021\"    The patient was notified by phone; I spoke with girlfriend ( she was on HIPPAA).

## 2021-05-06 NOTE — PROGRESS NOTES
TELE HEALTH VISIT (AUDIO-VISUAL)    Pursuant to the emergency declaration under the [facility] waiver authority and the [employer] Act, this Virtual  Visit was conducted, with patient's consent, to reduce the patient's risk of exposure to COVID-19 and provide continuity of care for an established patient. Service is  provided through a video synchronous discussion virtually to substitute for in-person clinic visit. Due to this being a TeleHealth encounter ([facility]), evaluation of the following organ systems was limited: Vitals/Constitutional/EENT/Resp/CV/GI//MS/Neuro/Skin/Xgyu-Zcig-Qzy. Emely Harris III  1970  [id number]    Mr. Concepcion Guillen is being seen virtually for a follow up visit using Doxy. me/Soonr Video visit/eduClippero or face time  Informed verbal consent to the virtual visit was obtained from Mr. Concepcion Guillen. Risks associated with HIPPA compliance with the virtual visit was explained to the patient. Mr. Concepcion Guillen is at his home and Debbie Gabriel. Kala NAVARRO is in her office. HISTORY OF PRESENT ILLNESS:  Mr. Concepcion Guillen is a 46 y.o. male  being assessed for a follow up visit for pain management for evaluation of ongoing care regarding his symptoms and monitoring of compliance with long term use high risk medications. He has a diagnosis of   1. Chronic pain syndrome    2. Chronic bilateral low back pain with bilateral sciatica    3. Lumbosacral spondylosis without myelopathy    4. Compression fracture of T12 vertebra, sequela    5. Chronic pain of left ankle    6. Cervicalgia    7.  Back spasm      On the Patients Pain Assessment form reviewed with the Medical Assistant:  He complains of pain in the bilateral lower back, bilateral mid-back and neck  with radiation to the upper leg Bilateral, lower leg Bilateral and feet Bilateral . He rates the pain 9/10 and distention. PHYSICAL EXAM:   Nursing note and vitals reviewed. There were no vitals taken for this visit. as per patient  Constitutional: He appears well-developed and well-nourished. No acute distress. Skin: Skin appears to be warm and dry. No rashes or any other marks noted. He is not diaphoretic. Neurological/Psychiatric:He is alert and oriented to person, place, and time. Coordination is  normal.  His mood isAppropriate and affect is Neutral/Euthymic(normal). His behavior is normal.   thought content normal.   Musculoskeletal / Extremities:Gait is normal, assistive devices use: none. IMPRESSION:   1. Chronic pain syndrome    2. Chronic bilateral low back pain with bilateral sciatica    3. Lumbosacral spondylosis without myelopathy    4. Compression fracture of T12 vertebra, sequela    5. Chronic pain of left ankle    6. Cervicalgia    7. Back spasm        PLAN:  Informed verbal consent regarding treatment was obtained  -Xtampza ER 9 mg daily, D/c Hydrocodone ER  -Continue with Zanaflex, ZTlido  -Home exercises/Kristie exercises recommended  -CBT techniques- relaxation therapies such as biofeedback, mindfulness based stress reduction, imagery, cognitive restructuring, problem solving discussed with patient  -He was advised weight reduction, diet changes- 800-1200 jen diet, diet diary, exercising, nutritional  consult increased physical activity as tolerated  -Last  UDS 3/20/21: Consistent  -Return in about 4 weeks (around 6/3/2021). -[place] record was obtained and reviewed  for the last one year and no indicators of drug misuse  were found. Any other controlled substance prescriptions  seen on the record have been accounted for, I am aware of the patient receiving these medications. [facility] O[facility] record will be rechecked as part of office protocol.      Current Outpatient Medications   Medication Sig Dispense Refill    tiZANidine (ZANAFLEX) 4 MG tablet Take 1 tablet by mouth 2 times daily 60 tablet 1    Lidocaine 1.8 % PTCH Apply 1 patch topically daily 30 patch 0    oxyCODONE ER 9 MG C12A Take 9 mg by mouth daily for 30 days. 30 each 0    pregabalin (LYRICA) 100 MG capsule Take 1 capsule by mouth nightly for 30 days. 30 capsule 0    methylPREDNISolone (MEDROL, CAL,) 4 MG tablet Use as directed 1 kit 0    aspirin EC 81 MG EC tablet Take 1 tablet by mouth daily 30 tablet 5    ibuprofen (ADVIL;MOTRIN) 800 MG tablet Take 1 tablet by mouth every 8 hours as needed for Pain 90 tablet 3     No current facility-administered medications for this visit. I will continue his current medication regimen  which is part of the above treatment schedule. It has been helping with Mr. Anh Dan chronic  medical problems which for this visit include:   Diagnoses of Chronic pain syndrome, Chronic bilateral low back pain with bilateral sciatica, Lumbosacral spondylosis without myelopathy, Compression fracture of T12 vertebra, sequela, Chronic pain of left ankle, Cervicalgia, and Back spasm were pertinent to this visit. Risks and benefits of the medications and other alternative treatments  including no treatment were discussed with the patient. The common side effects of these medications were also explained to the patient. Informed verbal consent was obtained. Goals of current treatment regimen include improvement in pain, restoration of functioning- with focus on improvement in physical performance, general activity, work or disability,emotional distress, health care utilization and  decreased medication consumption. Will continue to monitor progress towards achieving/maintaining therapeutic goals with special emphasis on  1. Improvement in perceived interfernce  of pain with ADL's. Ability to do home exercises independently. Ability to do household chores indoor and/or outdoor work and social and leisure activities. Improve psychosocial and physical functioning. - he is not showing any significant progress/or showing regression  towards this goal and reassessment and adjustment of goals/treatment have been made. He was advised against drinking alcohol with the narcotic pain medicines, advised against driving or handling machinery while adjusting the dose of medicines or if having cognitive  issues related to the current medications. Risk of overdose and death, if medicines not taken as prescribed, were also discussed. If the patient develops new symptoms or if the symptoms worsen, the patient should call the office. While transcribing every attempt was made to maintain the accuracy of the note in terms of it's contents,there may have been some errors made inadvertently. Thank you for allowing me to participate in the care of this patient. Isi Nunez.  Diane Cooney APRN-CNP     Cc: Amanda Ng MD

## 2021-05-10 NOTE — TELEPHONE ENCOUNTER
Patient is calling to see if he can have an epidural steroid injection. He said his commander said the [facility] will him to not shoot himself.     Please advise [phone]

## 2021-05-10 NOTE — TELEPHONE ENCOUNTER
Per PKA, she will not be switching his medications. Called and spoke with pt he expressed his understanding.

## 2021-05-10 NOTE — TELEPHONE ENCOUNTER
Patient called stating the Raya Hands is not working at all. He said he needs something with instant relief. If he had to grade this medication, he would give it 1 out of 10.     Please advise @ [phone]

## 2021-05-10 NOTE — TELEPHONE ENCOUNTER
Called pt and he expressed that he is having suicidal thoughts. We advise calling 911 and he disregarded and didn't need it.

## 2021-06-04 ENCOUNTER — VIRTUAL VISIT (OUTPATIENT)
Dept: PAIN MANAGEMENT | Age: 51
End: 2021-06-04
Payer: COMMERCIAL

## 2021-06-04 ENCOUNTER — TELEPHONE (OUTPATIENT)
Dept: PAIN MANAGEMENT | Age: 51
End: 2021-06-04

## 2021-06-04 DIAGNOSIS — G89.29 CHRONIC BILATERAL LOW BACK PAIN WITH BILATERAL SCIATICA: ICD-10-CM

## 2021-06-04 DIAGNOSIS — S22.080S COMPRESSION FRACTURE OF T12 VERTEBRA, SEQUELA: ICD-10-CM

## 2021-06-04 DIAGNOSIS — M54.42 CHRONIC BILATERAL LOW BACK PAIN WITH BILATERAL SCIATICA: ICD-10-CM

## 2021-06-04 DIAGNOSIS — M62.830 BACK SPASM: ICD-10-CM

## 2021-06-04 DIAGNOSIS — G89.29 CHRONIC PAIN OF LEFT ANKLE: ICD-10-CM

## 2021-06-04 DIAGNOSIS — M47.817 LUMBOSACRAL SPONDYLOSIS WITHOUT MYELOPATHY: ICD-10-CM

## 2021-06-04 DIAGNOSIS — M54.2 CERVICALGIA: ICD-10-CM

## 2021-06-04 DIAGNOSIS — M25.572 CHRONIC PAIN OF LEFT ANKLE: ICD-10-CM

## 2021-06-04 DIAGNOSIS — M54.41 CHRONIC BILATERAL LOW BACK PAIN WITH BILATERAL SCIATICA: ICD-10-CM

## 2021-06-04 DIAGNOSIS — G89.4 CHRONIC PAIN SYNDROME: ICD-10-CM

## 2021-06-04 PROCEDURE — G8428 CUR MEDS NOT DOCUMENT: HCPCS | Performed by: NURSE PRACTITIONER

## 2021-06-04 PROCEDURE — 99213 OFFICE O/P EST LOW 20 MIN: CPT | Performed by: NURSE PRACTITIONER

## 2021-06-04 PROCEDURE — 3017F COLORECTAL CA SCREEN DOC REV: CPT | Performed by: NURSE PRACTITIONER

## 2021-06-04 RX ORDER — TIZANIDINE 4 MG/1
4 TABLET ORAL 2 TIMES DAILY
Qty: 60 TABLET | Refills: 1 | Status: SHIPPED | OUTPATIENT
Start: 2021-06-04 | End: 2021-07-02 | Stop reason: SDUPTHER

## 2021-06-04 RX ORDER — HYDROCODONE BITARTRATE AND ACETAMINOPHEN 5; 325 MG/1; MG/1
1 TABLET ORAL EVERY 8 HOURS PRN
Qty: 84 TABLET | Refills: 0 | Status: SHIPPED | OUTPATIENT
Start: 2021-06-04 | End: 2021-07-02 | Stop reason: SDUPTHER

## 2021-06-04 NOTE — PATIENT INSTRUCTIONS
Patient Education        Back Stretches: Exercises  Introduction  Here are some examples of exercises for stretching your back. Start each exercise slowly. Ease off the exercise if you start to have pain. Your doctor or physical therapist will tell you when you can start these exercises and which ones will work best for you. How to do the exercises  Overhead stretch   1. Stand comfortably with your feet shoulder-width apart. 2. Looking straight ahead, raise both arms over your head and reach toward the ceiling. Do not allow your head to tilt back. 3. Hold for 15 to 30 seconds, then lower your arms to your sides. 4. Repeat 2 to 4 times. Side stretch   1. Stand comfortably with your feet shoulder-width apart. 2. Raise one arm over your head, and then lean to the other side. 3. Slide your hand down your leg as you let the weight of your arm gently stretch your side muscles. Hold for 15 to 30 seconds. 4. Repeat 2 to 4 times on each side. Press-up   1. Lie on your stomach, supporting your body with your forearms. 2. Press your elbows down into the floor to raise your upper back. As you do this, relax your stomach muscles and allow your back to arch without using your back muscles. As your press up, do not let your hips or pelvis come off the floor. 3. Hold for 15 to 30 seconds, then relax. 4. Repeat 2 to 4 times. Relax and rest   1. Lie on your back with a rolled towel under your neck and a pillow under your knees. Extend your arms comfortably to your sides. 2. Relax and breathe normally. 3. Remain in this position for about 10 minutes. 4. If you can, do this 2 or 3 times each day. Follow-up care is a key part of your treatment and safety. Be sure to make and go to all appointments, and call your doctor if you are having problems. It's also a good idea to know your test results and keep a list of the medicines you take. Where can you learn more? Go to [email or web address].[email or web address] [email or web address] org and sign in to your shopandsave account. Enter K884 in the TRIRIGA box to learn more about \"Back Stretches: Exercises. \"     If you do not have an account, please click on the \"Sign Up Now\" link. Current as of: November 16, 2020               Content Version: 12.8  © 2798-8056 Healthwise, Incorporated. Care instructions adapted under license by [facility] ([facility]). If you have questions about a medical condition or this instruction, always ask your healthcare professional. [address] any warranty or liability for your use of this information.

## 2021-06-04 NOTE — PROGRESS NOTES
TELE HEALTH VISIT (AUDIO-VISUAL)    Pursuant to the emergency declaration under the [facility], [facility] waiver authority and the [employer] Act, this Virtual  Visit was conducted, with patient's consent, to reduce the patient's risk of exposure to COVID-19 and provide continuity of care for an established patient. Service is  provided through a video synchronous discussion virtually to substitute for in-person clinic visit. Due to this being a TeleHealth encounter (During [facility] public [facility] emergency), evaluation of the following organ systems was limited: Vitals/Constitutional/EENT/Resp/CV/GI//MS/Neuro/Skin/Ydlo-Vbrk-Hon. Alpa Winn III  1970  [id number]    Mr. Lidia Slaughter is being seen virtually for a follow up visit using Doxy. me/Ludi Video visit/[place] or face time  Informed verbal consent to the virtual visit was obtained from Mr. Lidia Slaughter. Risks associated with HIPPA compliance with the virtual visit was explained to the patient. Mr. Lidia Slaughter is at his home and Janene Range. Rendall Schlatter APRN-CNP is in her office. HISTORY OF PRESENT ILLNESS:  Mr. Lidia Slaughter is a 46 y.o. male  being assessed for a follow up visit for pain management for evaluation of ongoing care regarding his symptoms and monitoring of compliance with long term use high risk medications. He has a diagnosis of   1. Chronic pain syndrome    2. Lumbosacral spondylosis without myelopathy    3. Compression fracture of T12 vertebra, sequela    4. Chronic bilateral low back pain with bilateral sciatica    5. Cervicalgia    6. Back spasm    7. Chronic pain of left ankle      On the Patients Pain Assessment form reviewed with the Medical Assistant:  He complains of pain in the bilateral lower back  with radiation to the neck . He rates the pain 10/10 and describes it as burning, throbbing, pins and needles, stabbing.  Current treatment regimen has helped relieve about 0% of the pain. He denies any side effects from the current pain regimen. Patient reports that since the last follow up visit the physical functioning is unchanged, family/social relationships are unchanged, mood is unchanged sleep patterns are unchanged, and that the overall functioning is unchanged. Patient denies misusing/abusing his narcotic pain medications or using any illegal drugs. Upon obtaining medical history from Mr. Sara Coreas states that pain is not manageable on current pain therapy. Reports that current pain medications do not provide any relief of pain, requesting to take Norco.  Mood is stable, sleep is fair with an average of 5-6 hours. Denies to having issues of constipation. Tolerating activities/house chores with moderate tenderness to the lower back. ALLERGIES: Patients list of allergies were reviewed     MEDICATIONS: Mr. Mickie Shannon list of medications were reviewed. His current medications are   Outpatient Medications Prior to Visit   Medication Sig Dispense Refill    methylPREDNISolone (MEDROL, CAL,) 4 MG tablet Use as directed 1 kit 0    aspirin EC 81 MG EC tablet Take 1 tablet by mouth daily 30 tablet 5    ibuprofen (ADVIL;MOTRIN) 800 MG tablet Take 1 tablet by mouth every 8 hours as needed for Pain 90 tablet 3    tiZANidine (ZANAFLEX) 4 MG tablet Take 1 tablet by mouth 2 times daily 60 tablet 1    Lidocaine 1.8 % PTCH Apply 1 patch topically daily 30 patch 0    oxyCODONE ER 9 MG C12A Take 9 mg by mouth daily for 30 days. 30 each 0    pregabalin (LYRICA) 100 MG capsule Take 1 capsule by mouth nightly for 30 days. 30 capsule 0     No facility-administered medications prior to visit. SOCIAL/FAMILY/PAST MEDICAL HISTORY: Mr. Vazquez Prayer, family and past medical history was reviewed. REVIEW OF SYSTEMS:    Respiratory: Negative for apnea, chest tightness and shortness of breath or change in baseline breathing.     Gastrointestinal: Negative for 81 MG EC tablet Take 1 tablet by mouth daily 30 tablet 5    ibuprofen (ADVIL;MOTRIN) 800 MG tablet Take 1 tablet by mouth every 8 hours as needed for Pain 90 tablet 3    pregabalin (LYRICA) 100 MG capsule Take 1 capsule by mouth nightly for 30 days. 30 capsule 0     No current facility-administered medications for this visit. I will continue his current medication regimen  which is part of the above treatment schedule. It has been helping with Mr. Betzy Barrett chronic  medical problems which for this visit include:   Diagnoses of Chronic pain syndrome, Lumbosacral spondylosis without myelopathy, Compression fracture of T12 vertebra, sequela, Chronic bilateral low back pain with bilateral sciatica, Cervicalgia, Back spasm, and Chronic pain of left ankle were pertinent to this visit. Risks and benefits of the medications and other alternative treatments  including no treatment were discussed with the patient. The common side effects of these medications were also explained to the patient. Informed verbal consent was obtained. Goals of current treatment regimen include improvement in pain, restoration of functioning- with focus on improvement in physical performance, general activity, work or disability,emotional distress, health care utilization and  decreased medication consumption. Will continue to monitor progress towards achieving/maintaining therapeutic goals with special emphasis on  1. Improvement in perceived interfernce  of pain with ADL's. Ability to do home exercises independently. Ability to do household chores indoor and/or outdoor work and social and leisure activities. Improve psychosocial and physical functioning. - he is showing progression towards this treatment goal with the current regimen.     He was advised against drinking alcohol with the narcotic pain medicines, advised against driving or handling machinery while adjusting the dose of medicines or if having cognitive  issues related to the current medications. Risk of overdose and death, if medicines not taken as prescribed, were also discussed. If the patient develops new symptoms or if the symptoms worsen, the patient should call the office. While transcribing every attempt was made to maintain the accuracy of the note in terms of it's contents,there may have been some errors made inadvertently. Thank you for allowing me to participate in the care of this patient. Talat Correia.  Elan Love APRN-CNP     Cc: Jorge A Lebron MD

## 2021-06-29 ENCOUNTER — HOSPITAL ENCOUNTER (OUTPATIENT)
Dept: MRI IMAGING | Age: 51
Discharge: HOME | End: 2021-06-29
Payer: COMMERCIAL

## 2021-06-29 ENCOUNTER — TELEPHONE (OUTPATIENT)
Dept: PAIN MANAGEMENT | Age: 51
End: 2021-06-29

## 2021-06-29 DIAGNOSIS — G89.4 CHRONIC PAIN SYNDROME: ICD-10-CM

## 2021-07-01 DIAGNOSIS — M54.42 CHRONIC BILATERAL LOW BACK PAIN WITH BILATERAL SCIATICA: ICD-10-CM

## 2021-07-01 DIAGNOSIS — M54.41 CHRONIC BILATERAL LOW BACK PAIN WITH BILATERAL SCIATICA: ICD-10-CM

## 2021-07-01 DIAGNOSIS — G89.29 CHRONIC BILATERAL LOW BACK PAIN WITH BILATERAL SCIATICA: ICD-10-CM

## 2021-07-01 DIAGNOSIS — G89.4 CHRONIC PAIN SYNDROME: ICD-10-CM

## 2021-07-01 DIAGNOSIS — M54.2 CERVICALGIA: ICD-10-CM

## 2021-07-01 DIAGNOSIS — G89.29 CHRONIC PAIN OF LEFT ANKLE: ICD-10-CM

## 2021-07-01 DIAGNOSIS — M47.817 LUMBOSACRAL SPONDYLOSIS WITHOUT MYELOPATHY: ICD-10-CM

## 2021-07-01 DIAGNOSIS — M62.830 BACK SPASM: ICD-10-CM

## 2021-07-01 DIAGNOSIS — M25.572 CHRONIC PAIN OF LEFT ANKLE: ICD-10-CM

## 2021-07-01 DIAGNOSIS — S22.080S COMPRESSION FRACTURE OF T12 VERTEBRA, SEQUELA: ICD-10-CM

## 2021-07-01 RX ORDER — HYDROCODONE BITARTRATE AND ACETAMINOPHEN 5; 325 MG/1; MG/1
TABLET ORAL
Qty: 84 TABLET | Refills: 0 | OUTPATIENT
Start: 2021-07-01

## 2021-07-02 ENCOUNTER — VIRTUAL VISIT (OUTPATIENT)
Dept: PAIN MANAGEMENT | Age: 51
End: 2021-07-02
Payer: COMMERCIAL

## 2021-07-02 DIAGNOSIS — M47.817 LUMBOSACRAL SPONDYLOSIS WITHOUT MYELOPATHY: ICD-10-CM

## 2021-07-02 DIAGNOSIS — M54.41 CHRONIC BILATERAL LOW BACK PAIN WITH BILATERAL SCIATICA: ICD-10-CM

## 2021-07-02 DIAGNOSIS — S22.080S COMPRESSION FRACTURE OF T12 VERTEBRA, SEQUELA: ICD-10-CM

## 2021-07-02 DIAGNOSIS — M25.572 CHRONIC PAIN OF LEFT ANKLE: ICD-10-CM

## 2021-07-02 DIAGNOSIS — G89.29 CHRONIC BILATERAL LOW BACK PAIN WITH BILATERAL SCIATICA: ICD-10-CM

## 2021-07-02 DIAGNOSIS — M54.2 CERVICALGIA: ICD-10-CM

## 2021-07-02 DIAGNOSIS — M62.830 BACK SPASM: ICD-10-CM

## 2021-07-02 DIAGNOSIS — G89.4 CHRONIC PAIN SYNDROME: ICD-10-CM

## 2021-07-02 DIAGNOSIS — Z51.81 ENCOUNTER FOR THERAPEUTIC DRUG MONITORING: ICD-10-CM

## 2021-07-02 DIAGNOSIS — M54.42 CHRONIC BILATERAL LOW BACK PAIN WITH BILATERAL SCIATICA: ICD-10-CM

## 2021-07-02 DIAGNOSIS — G89.29 CHRONIC PAIN OF LEFT ANKLE: ICD-10-CM

## 2021-07-02 PROCEDURE — 99213 OFFICE O/P EST LOW 20 MIN: CPT | Performed by: NURSE PRACTITIONER

## 2021-07-02 PROCEDURE — 3017F COLORECTAL CA SCREEN DOC REV: CPT | Performed by: NURSE PRACTITIONER

## 2021-07-02 PROCEDURE — G8427 DOCREV CUR MEDS BY ELIG CLIN: HCPCS | Performed by: NURSE PRACTITIONER

## 2021-07-02 RX ORDER — HYDROCODONE BITARTRATE AND ACETAMINOPHEN 5; 325 MG/1; MG/1
1 TABLET ORAL EVERY 8 HOURS PRN
Qty: 84 TABLET | Refills: 0 | Status: SHIPPED | OUTPATIENT
Start: 2021-07-02 | End: 2021-08-11 | Stop reason: SDUPTHER

## 2021-07-02 RX ORDER — TIZANIDINE 4 MG/1
4 TABLET ORAL 2 TIMES DAILY
Qty: 60 TABLET | Refills: 1 | Status: SHIPPED | OUTPATIENT
Start: 2021-07-02 | End: 2021-08-11 | Stop reason: SDUPTHER

## 2021-07-02 NOTE — PATIENT INSTRUCTIONS
and sign in to your [email or web address] account. Enter Z544 in the CareParent box to learn more about \"Back Stretches: Exercises. \"     If you do not have an account, please click on the \"Sign Up Now\" link. Current as of: November 16, 2020               Content Version: 12.9  © 9751-5210 Healthwise, Incorporated. Care instructions adapted under license by [facility] ([facility]). If you have questions about a medical condition or this instruction, always ask your healthcare professional. [address] any warranty or liability for your use of this information.

## 2021-07-02 NOTE — PROGRESS NOTES
TELE HEALTH VISIT (AUDIO-VISUAL)    Pursuant to the emergency declaration under the [facility], [facility] waiver authority and the [employer] and [employer] Act, this Virtual  Visit was conducted, with patient's consent, to reduce the patient's risk of exposure to COVID-19 and provide continuity of care for an established patient. Service is  provided through a video synchronous discussion virtually to substitute for in-person clinic visit. Due to this being a TeleHealth encounter (During [facility] public health emergency), evaluation of the following organ systems was limited: Vitals/Constitutional/EENT/Resp/CV/GI//MS/Neuro/Skin/Yqdj-Gvpk-Jst. Alma Fan III  1970  [id number]    Mr. Tami Fonseca is being seen virtually for a follow up visit using Doxy. me/zhouwu Video visit/[employer] or face time  Informed verbal consent to the virtual visit was obtained from Mr. Tami Fonseca. Risks associated with HIPPA compliance with the virtual visit was explained to the patient. Mr. Tami Fonseca is at his home and Lorena AVILA-CNP is in her office. HISTORY OF PRESENT ILLNESS:  Mr. Tami Fonseca is a 46 y.o. male  being assessed for a follow up visit for pain management for evaluation of ongoing care regarding his symptoms and monitoring of compliance with long term use high risk medications. He has a diagnosis of   1. Chronic pain syndrome    2. Chronic bilateral low back pain with bilateral sciatica    3. Lumbosacral spondylosis without myelopathy    4. Compression fracture of T12 vertebra, sequela    5. Chronic pain of left ankle    6. Cervicalgia    7. Back spasm    8. Encounter for therapeutic drug monitoring      On the Patients Pain Assessment form reviewed with the Medical Assistant:  He complains of pain in the both leg(s): entire area, bilateral lower back and neck  .  He rates the pain 7/10 and describes it as aching, numbness, tingling. Current treatment regimen has helped relieve about 20% of the pain. He denies any side effects from the current pain regimen. Patient reports that since the last follow up visit the physical functioning is unchanged, family/social relationships are unchanged, mood is unchanged sleep patterns are unchanged, and that the overall functioning is unchanged. Patient denies misusing/abusing his narcotic pain medications or using any illegal drugs. Upon obtaining medical history from Mr. Gardenia Prado states that pain is not manageable on current pain therapy. Reports while the Norco seems to alleviate some of the pain, it is not lasting long enough. Pain mainly in the lower back, he was not able to complete MRI of the Lumbar spine due to claustrophobia. Requesting for addition of the extended release pain medications. Mood is stable without anxiety. Sleep is fair with an average of 5-6 hours. Denies to having issues of constipation. Tolerating activities/house chores with moderate tenderness to the lower back. Working on smoking cessation    ALLERGIES: Patients list of allergies were reviewed     MEDICATIONS: Mr. Gardenia Prado list of medications were reviewed. His current medications are   Outpatient Medications Prior to Visit   Medication Sig Dispense Refill    aspirin EC 81 MG EC tablet Take 1 tablet by mouth daily 30 tablet 5    ibuprofen (ADVIL;MOTRIN) 800 MG tablet Take 1 tablet by mouth every 8 hours as needed for Pain 90 tablet 3    tiZANidine (ZANAFLEX) 4 MG tablet Take 1 tablet by mouth 2 times daily 60 tablet 1    Lidocaine 1.8 % PTCH Apply 1 patch topically daily 30 patch 0    HYDROcodone-acetaminophen (NORCO) 5-325 MG per tablet Take 1 tablet by mouth every 8 hours as needed for Pain for up to 28 days. Intended supply: 3 days. Take lowest dose possible to manage pain 84 tablet 0    pregabalin (LYRICA) 100 MG capsule Take 1 capsule by mouth nightly for 30 days.  30 capsule 0    methylPREDNISolone (MEDROL, CAL,) 4 MG tablet Use as directed 1 kit 0     No facility-administered medications prior to visit. SOCIAL/FAMILY/PAST MEDICAL HISTORY: Mr. Epifanio Dean, family and past medical history was reviewed. REVIEW OF SYSTEMS:    Respiratory: Negative for apnea, chest tightness and shortness of breath or change in baseline breathing. Gastrointestinal: Negative for nausea, vomiting, abdominal pain, diarrhea, constipation, blood in stool and abdominal distention. PHYSICAL EXAM:   Nursing note and vitals reviewed. There were no vitals taken for this visit. as per patient  Constitutional: He appears well-developed and well-nourished. No acute distress. Skin: Skin appears to be warm and dry. No rashes or any other marks noted. He is not diaphoretic. Neurological/Psychiatric:He is alert and oriented to person, place, and time. Coordination is  normal.  His mood isAppropriate and affect is Neutral/Euthymic(normal). His behavior is normal.   thought content normal.   Musculoskeletal / Extremities: Not assessed    IMPRESSION:   1. Chronic pain syndrome    2. Chronic bilateral low back pain with bilateral sciatica    3. Lumbosacral spondylosis without myelopathy    4. Compression fracture of T12 vertebra, sequela    5. Chronic pain of left ankle    6. Cervicalgia    7. Back spasm    8.  Encounter for therapeutic drug monitoring        PLAN:  Informed verbal consent regarding treatment was obtained  -Continue with Norco, Zanaflex, Ztlido  -Sobeida exercises recommended  -No changes were made to his opioids today, recommended patient   -D/c MRI of the Lumbar spine, CT of the Lumbar spine, patient had anxiety with completing another MRI  -Informed the patient that opioids will be maintained as is, will consider referral to orthopedics spine specialists based on the results, he is open to [facility] of the Lumbar spine  -CBT techniques- relaxation therapies such as biofeedback, mindfulness based stress reduction, imagery, cognitive restructuring, problem solving discussed with patient  -Last UDS 3/30/21 Consistent  -Return in about 4 weeks (around 7/30/2021). Current Outpatient Medications   Medication Sig Dispense Refill    HYDROcodone-acetaminophen (NORCO) 5-325 MG per tablet Take 1 tablet by mouth every 8 hours as needed for Pain for up to 28 days. Intended supply: 3 days. Take lowest dose possible to manage pain 84 tablet 0    Lidocaine 1.8 % PTCH Apply 1 patch topically daily 30 patch 0    tiZANidine (ZANAFLEX) 4 MG tablet Take 1 tablet by mouth 2 times daily 60 tablet 1    aspirin EC 81 MG EC tablet Take 1 tablet by mouth daily 30 tablet 5    ibuprofen (ADVIL;MOTRIN) 800 MG tablet Take 1 tablet by mouth every 8 hours as needed for Pain 90 tablet 3    pregabalin (LYRICA) 100 MG capsule Take 1 capsule by mouth nightly for 30 days. 30 capsule 0    methylPREDNISolone (MEDROL, CAL,) 4 MG tablet Use as directed 1 kit 0     No current facility-administered medications for this visit. I will continue his current medication regimen  which is part of the above treatment schedule. It has been helping with Mr. Molly Oviedo chronic  medical problems which for this visit include:   Diagnoses of Chronic pain syndrome, Chronic bilateral low back pain with bilateral sciatica, Lumbosacral spondylosis without myelopathy, Compression fracture of T12 vertebra, sequela, Chronic pain of left ankle, Cervicalgia, Back spasm, and Encounter for therapeutic drug monitoring were pertinent to this visit. Risks and benefits of the medications and other alternative treatments  including no treatment were discussed with the patient. The common side effects of these medications were also explained to the patient. Informed verbal consent was obtained.    Goals of current treatment regimen include improvement in pain, restoration of functioning- with focus on improvement in physical performance, general activity, work or disability,emotional distress, health care utilization and  decreased medication consumption. Will continue to monitor progress towards achieving/maintaining therapeutic goals with special emphasis on  1. Improvement in perceived interfernce  of pain with ADL's. Ability to do home exercises independently. Ability to do household chores indoor and/or outdoor work and social and leisure activities. Improve psychosocial and physical functioning. - he is showing progression towards this treatment goal with the current regimen. He was advised against drinking alcohol with the narcotic pain medicines, advised against driving or handling machinery while adjusting the dose of medicines or if having cognitive  issues related to the current medications. Risk of overdose and death, if medicines not taken as prescribed, were also discussed. If the patient develops new symptoms or if the symptoms worsen, the patient should call the office. While transcribing every attempt was made to maintain the accuracy of the note in terms of it's contents,there may have been some errors made inadvertently. Thank you for allowing me to participate in the care of this patient. Chelsea Wilder.  Yuli AVILA-CNP     Cc: Woo Anderson MD

## 2021-07-23 ENCOUNTER — HOSPITAL ENCOUNTER (OUTPATIENT)
Dept: CT IMAGING | Age: 51
Discharge: HOME | End: 2021-07-23
Payer: COMMERCIAL

## 2021-07-23 ENCOUNTER — HOSPITAL ENCOUNTER (OUTPATIENT)
Dept: GENERAL RADIOLOGY | Age: 51
Discharge: HOME | End: 2021-07-23
Payer: COMMERCIAL

## 2021-07-23 DIAGNOSIS — G89.29 CHRONIC PAIN OF LEFT ANKLE: ICD-10-CM

## 2021-07-23 DIAGNOSIS — G89.29 CHRONIC BILATERAL LOW BACK PAIN WITH BILATERAL SCIATICA: ICD-10-CM

## 2021-07-23 DIAGNOSIS — M54.41 CHRONIC BILATERAL LOW BACK PAIN WITH BILATERAL SCIATICA: ICD-10-CM

## 2021-07-23 DIAGNOSIS — G89.4 CHRONIC PAIN SYNDROME: ICD-10-CM

## 2021-07-23 DIAGNOSIS — M54.42 CHRONIC BILATERAL LOW BACK PAIN WITH BILATERAL SCIATICA: ICD-10-CM

## 2021-07-23 DIAGNOSIS — M54.2 CERVICALGIA: ICD-10-CM

## 2021-07-23 DIAGNOSIS — M47.817 LUMBOSACRAL SPONDYLOSIS WITHOUT MYELOPATHY: ICD-10-CM

## 2021-07-23 DIAGNOSIS — M25.572 CHRONIC PAIN OF LEFT ANKLE: ICD-10-CM

## 2021-07-23 PROCEDURE — 6360000004 HC RX CONTRAST MEDICATION: Performed by: NURSE PRACTITIONER

## 2021-07-23 PROCEDURE — 72040 X-RAY EXAM NECK SPINE 2-3 VW: CPT

## 2021-07-23 PROCEDURE — 73610 X-RAY EXAM OF ANKLE: CPT

## 2021-07-23 PROCEDURE — 72132 CT LUMBAR SPINE W/DYE: CPT

## 2021-07-23 RX ADMIN — IOPAMIDOL 75 ML: 755 INJECTION, SOLUTION INTRAVENOUS at 13:13

## 2021-08-11 ENCOUNTER — OFFICE VISIT (OUTPATIENT)
Dept: PAIN MANAGEMENT | Age: 51
End: 2021-08-11
Payer: COMMERCIAL

## 2021-08-11 VITALS
BODY MASS INDEX: 32.05 KG/M2 | TEMPERATURE: 97.5 F | SYSTOLIC BLOOD PRESSURE: 155 MMHG | HEART RATE: 101 BPM | WEIGHT: 223.4 LBS | DIASTOLIC BLOOD PRESSURE: 105 MMHG | OXYGEN SATURATION: 98 %

## 2021-08-11 DIAGNOSIS — M54.2 CERVICALGIA: ICD-10-CM

## 2021-08-11 DIAGNOSIS — G89.29 CHRONIC PAIN OF LEFT ANKLE: ICD-10-CM

## 2021-08-11 DIAGNOSIS — M54.41 CHRONIC BILATERAL LOW BACK PAIN WITH BILATERAL SCIATICA: ICD-10-CM

## 2021-08-11 DIAGNOSIS — M62.830 BACK SPASM: ICD-10-CM

## 2021-08-11 DIAGNOSIS — G89.29 CHRONIC BILATERAL LOW BACK PAIN WITH BILATERAL SCIATICA: ICD-10-CM

## 2021-08-11 DIAGNOSIS — M47.817 LUMBOSACRAL SPONDYLOSIS WITHOUT MYELOPATHY: ICD-10-CM

## 2021-08-11 DIAGNOSIS — G89.4 CHRONIC PAIN SYNDROME: ICD-10-CM

## 2021-08-11 DIAGNOSIS — Z51.81 ENCOUNTER FOR THERAPEUTIC DRUG MONITORING: ICD-10-CM

## 2021-08-11 DIAGNOSIS — M25.572 CHRONIC PAIN OF LEFT ANKLE: ICD-10-CM

## 2021-08-11 DIAGNOSIS — S22.080S COMPRESSION FRACTURE OF T12 VERTEBRA, SEQUELA: ICD-10-CM

## 2021-08-11 DIAGNOSIS — M54.42 CHRONIC BILATERAL LOW BACK PAIN WITH BILATERAL SCIATICA: ICD-10-CM

## 2021-08-11 PROCEDURE — 3017F COLORECTAL CA SCREEN DOC REV: CPT | Performed by: NURSE PRACTITIONER

## 2021-08-11 PROCEDURE — 4004F PT TOBACCO SCREEN RCVD TLK: CPT | Performed by: NURSE PRACTITIONER

## 2021-08-11 PROCEDURE — G8427 DOCREV CUR MEDS BY ELIG CLIN: HCPCS | Performed by: NURSE PRACTITIONER

## 2021-08-11 PROCEDURE — G8417 CALC BMI ABV UP PARAM F/U: HCPCS | Performed by: NURSE PRACTITIONER

## 2021-08-11 PROCEDURE — 99214 OFFICE O/P EST MOD 30 MIN: CPT | Performed by: NURSE PRACTITIONER

## 2021-08-11 RX ORDER — HYDROCODONE BITARTRATE AND ACETAMINOPHEN 5; 325 MG/1; MG/1
1 TABLET ORAL EVERY 8 HOURS PRN
Qty: 84 TABLET | Refills: 0 | Status: SHIPPED | OUTPATIENT
Start: 2021-08-11 | End: 2021-09-15 | Stop reason: SDUPTHER

## 2021-08-11 RX ORDER — TIZANIDINE 4 MG/1
4 TABLET ORAL 2 TIMES DAILY
Qty: 60 TABLET | Refills: 1 | Status: SHIPPED | OUTPATIENT
Start: 2021-08-11 | End: 2021-09-15

## 2021-08-11 NOTE — PATIENT INSTRUCTIONS
Patient Education        Back Stretches: Exercises  Introduction  Here are some examples of exercises for stretching your back. Start each exercise slowly. Ease off the exercise if you start to have pain. Your doctor or physical therapist will tell you when you can start these exercises and which ones will work best for you. How to do the exercises  Overhead stretch   1. Stand comfortably with your feet shoulder-width apart. 2. Looking straight ahead, raise both arms over your head and reach toward the ceiling. Do not allow your head to tilt back. 3. Hold for 15 to 30 seconds, then lower your arms to your sides. 4. Repeat 2 to 4 times. Side stretch   1. Stand comfortably with your feet shoulder-width apart. 2. Raise one arm over your head, and then lean to the other side. 3. Slide your hand down your leg as you let the weight of your arm gently stretch your side muscles. Hold for 15 to 30 seconds. 4. Repeat 2 to 4 times on each side. Press-up   1. Lie on your stomach, supporting your body with your forearms. 2. Press your elbows down into the floor to raise your upper back. As you do this, relax your stomach muscles and allow your back to arch without using your back muscles. As your press up, do not let your hips or pelvis come off the floor. 3. Hold for 15 to 30 seconds, then relax. 4. Repeat 2 to 4 times. Relax and rest   1. Lie on your back with a rolled towel under your neck and a pillow under your knees. Extend your arms comfortably to your sides. 2. Relax and breathe normally. 3. Remain in this position for about 10 minutes. 4. If you can, do this 2 or 3 times each day. Follow-up care is a key part of your treatment and safety. Be sure to make and go to all appointments, and call your doctor if you are having problems. It's also a good idea to know your test results and keep a list of the medicines you take. Where can you learn more? Go to https://[email or web address].healthPipit [email or web address] org and sign in to your [employer] account. Enter V323 in the [employer] box to learn more about \"Back Stretches: Exercises. \"     If you do not have an account, please click on the \"Sign Up Now\" link. Current as of: November 16, 2020               Content Version: 12.9  © 5575-8635 Healthwise, Incorporated. Care instructions adapted under license by [facility] ([facility]). If you have questions about a medical condition or this instruction, always ask your healthcare professional. [address] any warranty or liability for your use of this information.

## 2021-08-11 NOTE — PROGRESS NOTES
Baltazar Santana III  1970  [id number]      HISTORY OF PRESENT ILLNESS:  Mr. Ar Giordano is a 46 y.o. male returns for a follow up visit for pain management  He has a diagnosis of   1. Chronic pain syndrome    2. Chronic bilateral low back pain with bilateral sciatica    3. Lumbosacral spondylosis without myelopathy    4. Compression fracture of T12 vertebra, sequela    5. Chronic pain of left ankle    6. Cervicalgia    7. Back spasm    8. Encounter for therapeutic drug monitoring      On the Patients Pain Assessment form reviewed with the Medical Assistant:  He complains of pain in the both foot/feet: entire area, both leg(s): entire area, bilateral lower back, bilateral mid-back and neck He rates the pain 9/10 and describes it as aching, burning, numbness, tingling, pins and needles, piercing. Current treatment regimen has helped relieve about 20% of the pain. He denies any side effects from the current pain regimen. Patient reports that since the last follow up visit the physical functioning is unchanged, family/social relationships are unchanged, mood is unchanged sleep patterns are unchanged, and that the overall functioning is unchanged. Patient denies misusing/abusing his narcotic pain medications or using any illegal drugs. There are No indicators for possible drug abuse, addiction or diversion problems. Upon obtaining medical history from Mr. Ar Giordano states that pain is manageable on current pain therapy. Endorses tenderness to the lower back with increased physical therapy. Pain medications provide moderate relief of pain, takes them as prescribed. Mood is stable without anxiety. Sleep is fair with an average of 5-6 hours. Denies to having issues of constipation. Tolerating activities/house chores with moderate tenderness to the lower back. Working on smoking cessation    ALLERGIES: Patients list of allergies were reviewed     MEDICATIONS: Mr. Ar Giordano list of medications were reviewed. His current medications are   Outpatient Medications Prior to Visit   Medication Sig Dispense Refill    pregabalin (LYRICA) 100 MG capsule Take 1 capsule by mouth nightly for 30 days. 30 capsule 0    methylPREDNISolone (MEDROL, CAL,) 4 MG tablet Use as directed 1 kit 0    aspirin EC 81 MG EC tablet Take 1 tablet by mouth daily 30 tablet 5    ibuprofen (ADVIL;MOTRIN) 800 MG tablet Take 1 tablet by mouth every 8 hours as needed for Pain 90 tablet 3     No facility-administered medications prior to visit. SOCIAL/FAMILY/PAST MEDICAL HISTORY: Mr. Min Hands, family and past medical history was reviewed. REVIEW OF SYSTEMS:    Respiratory: Negative for apnea, chest tightness and shortness of breath or change in baseline breathing. Gastrointestinal: Negative for nausea, vomiting, abdominal pain, diarrhea, constipation, blood in stool and abdominal distention. PHYSICAL EXAM:   Nursing note and vitals reviewed. BP (!) 155/105   Pulse 101   Temp 97.5 °F (36.4 °C) (Temporal)   Wt 223 lb 6.4 oz (101.3 kg)   SpO2 98%   BMI 32.05 kg/m²   Constitutional: He appears well-developed and well-nourished. No acute distress. Skin: Skin is warm and dry, good turgor. No rash noted. He is not diaphoretic. Cardiovascular: Normal rate, regular rhythm, normal heart sounds, and does not have murmur. Pulmonary/Chest: Effort normal. No respiratory distress. He does not have wheezes in the lung fields. He has no rales. Neurological/Psychiatric:He is alert and oriented to person, place, and time. Coordination is  Normal. +Lumbar pain  His mood isAppropriate and affect is Neutral/Euthymic(normal) .      CT scan of the Lumbar spine 7/23/21:  1.  No acute fracture or listhesis in the lumbar spine.  Chronic T12   compression fracture is similar compared to thoracic spine MRI done February 17, 2021.       2.  L2-L3 central/left subarticular disc protrusion mildly narrows the spinal   canal and displaces the left anterior thecal sac.  No severe lumbar spinal   canal stenosis at any level.       3.  Mild bilateral L4-L5 neural foraminal narrowing. Xray of the Cervical spine 7/23/21:  No acute abnormality.  Straight alignment.  No significant degenerative   changes. IMPRESSION:   1. Chronic pain syndrome    2. Chronic bilateral low back pain with bilateral sciatica    3. Lumbosacral spondylosis without myelopathy    4. Compression fracture of T12 vertebra, sequela    5. Chronic pain of left ankle    6. Cervicalgia    7. Back spasm    8. Encounter for therapeutic drug monitoring        PLAN:  Informed verbal consent was obtained  -Continue with Norco, Zanaflex, ZTlido  -D/c Lyrica, not working  -Kristie exercises/cervical stretches  -Reviewed previously ordered MRI of the Lumbar spine, Cervical Xray  -Referral will be provided to f/u with Dr. Germaine Guerrero for Lumbar spine degeneration, willing to try CARLITO of the Lumbar spine  -CBT techniques- relaxation therapies such as biofeedback, mindfulness based stress reduction, imagery, cognitive restructuring, problem solving discussed with patient  -Advised patient to quit smoking for  health related concerns and to improve the treatment outcomes. Education was given on quitting smoking and the use of different modalities including medications, hypnotherapy, counselling  and biofeedback. These were discussed with patient.  -Last UDS 3/30/21 Consistent  -Return in about 4 weeks (around 9/8/2021). Current Outpatient Medications   Medication Sig Dispense Refill    HYDROcodone-acetaminophen (NORCO) 5-325 MG per tablet Take 1 tablet by mouth every 8 hours as needed for Pain for up to 28 days. Intended supply: 3 days.  Take lowest dose possible to manage pain 84 tablet 0    Lidocaine 1.8 % PTCH Apply 1 patch topically daily 30 patch 0    tiZANidine (ZANAFLEX) 4 MG tablet Take 1 tablet by mouth 2 times daily 60 tablet 1    pregabalin (LYRICA) 100 MG capsule Take 1 capsule by mouth nightly for 30 days. 30 capsule 0    methylPREDNISolone (MEDROL, CAL,) 4 MG tablet Use as directed 1 kit 0    aspirin EC 81 MG EC tablet Take 1 tablet by mouth daily 30 tablet 5    ibuprofen (ADVIL;MOTRIN) 800 MG tablet Take 1 tablet by mouth every 8 hours as needed for Pain 90 tablet 3     No current facility-administered medications for this visit. I will continue his current medication regimen  which is part of the above treatment schedule. It has been helping with Mr. Harley Cummings chronic  medical problems which for this visit include:   Diagnoses of Chronic pain syndrome, Chronic bilateral low back pain with bilateral sciatica, Lumbosacral spondylosis without myelopathy, Compression fracture of T12 vertebra, sequela, Chronic pain of left ankle, Cervicalgia, Back spasm, and Encounter for therapeutic drug monitoring were pertinent to this visit. Risks and benefits of the medications and other alternative treatments  including no treatment were discussed with the patient. The common side effects of these medications were also explained to the patient. Informed verbal consent was obtained. Goals of current treatment regimen include improvement in pain, restoration of functioning- with focus on improvement in physical performance, general activity, work or disability,emotional distress, health care utilization and  decreased medication consumption. Will continue to monitor progress towards achieving/maintaining therapeutic goals with special emphasis on  1. Improvement in perceived interfernce  of pain with ADL's. Ability to do home exercises independently. Ability to do household chores indoor and/or outdoor work and social and leisure activities. Improve psychosocial and physical functioning. - he is showing progression towards this treatment goal with the current regimen.     He was advised against drinking alcohol with the narcotic pain medicines, advised against driving or handling machinery while adjusting the dose of medicines or if having cognitive  issues related to the current medications. Risk of overdose and death, if medicines not taken as prescribed, were also discussed. If the patient develops new symptoms or if the symptoms worsen, the patient should call the office. While transcribing every attempt was made to maintain the accuracy of the note in terms of it's contents,there may have been some errors made inadvertently. Thank you for allowing me to participate in the care of this patient.     William Packer CNP    Cc: Andreina Lyons MD

## 2021-09-15 ENCOUNTER — OFFICE VISIT (OUTPATIENT)
Dept: PAIN MANAGEMENT | Age: 51
End: 2021-09-15
Payer: COMMERCIAL

## 2021-09-15 VITALS
WEIGHT: 220 LBS | HEIGHT: 70 IN | OXYGEN SATURATION: 98 % | DIASTOLIC BLOOD PRESSURE: 96 MMHG | HEART RATE: 101 BPM | BODY MASS INDEX: 31.5 KG/M2 | SYSTOLIC BLOOD PRESSURE: 140 MMHG | TEMPERATURE: 97.2 F

## 2021-09-15 DIAGNOSIS — M54.41 CHRONIC BILATERAL LOW BACK PAIN WITH BILATERAL SCIATICA: ICD-10-CM

## 2021-09-15 DIAGNOSIS — M25.572 CHRONIC PAIN OF LEFT ANKLE: ICD-10-CM

## 2021-09-15 DIAGNOSIS — M47.817 LUMBOSACRAL SPONDYLOSIS WITHOUT MYELOPATHY: ICD-10-CM

## 2021-09-15 DIAGNOSIS — M62.830 BACK SPASM: ICD-10-CM

## 2021-09-15 DIAGNOSIS — Z51.81 ENCOUNTER FOR THERAPEUTIC DRUG MONITORING: ICD-10-CM

## 2021-09-15 DIAGNOSIS — S22.080S COMPRESSION FRACTURE OF T12 VERTEBRA, SEQUELA: ICD-10-CM

## 2021-09-15 DIAGNOSIS — M54.2 CERVICALGIA: ICD-10-CM

## 2021-09-15 DIAGNOSIS — M54.42 CHRONIC BILATERAL LOW BACK PAIN WITH BILATERAL SCIATICA: ICD-10-CM

## 2021-09-15 DIAGNOSIS — G89.29 CHRONIC PAIN OF LEFT ANKLE: ICD-10-CM

## 2021-09-15 DIAGNOSIS — G89.29 CHRONIC BILATERAL LOW BACK PAIN WITH BILATERAL SCIATICA: ICD-10-CM

## 2021-09-15 DIAGNOSIS — G89.4 CHRONIC PAIN SYNDROME: ICD-10-CM

## 2021-09-15 PROCEDURE — G8417 CALC BMI ABV UP PARAM F/U: HCPCS | Performed by: NURSE PRACTITIONER

## 2021-09-15 PROCEDURE — 3017F COLORECTAL CA SCREEN DOC REV: CPT | Performed by: NURSE PRACTITIONER

## 2021-09-15 PROCEDURE — 4004F PT TOBACCO SCREEN RCVD TLK: CPT | Performed by: NURSE PRACTITIONER

## 2021-09-15 PROCEDURE — 99213 OFFICE O/P EST LOW 20 MIN: CPT | Performed by: NURSE PRACTITIONER

## 2021-09-15 PROCEDURE — G8427 DOCREV CUR MEDS BY ELIG CLIN: HCPCS | Performed by: NURSE PRACTITIONER

## 2021-09-15 RX ORDER — HYDROCODONE BITARTRATE AND ACETAMINOPHEN 5; 325 MG/1; MG/1
1 TABLET ORAL EVERY 8 HOURS PRN
Qty: 84 TABLET | Refills: 0 | Status: SHIPPED | OUTPATIENT
Start: 2021-09-15 | End: 2021-10-12 | Stop reason: SDUPTHER

## 2021-09-15 RX ORDER — BACLOFEN 10 MG/1
10 TABLET ORAL 2 TIMES DAILY
Qty: 60 TABLET | Refills: 0 | Status: SHIPPED | OUTPATIENT
Start: 2021-09-15 | End: 2021-10-12 | Stop reason: SDUPTHER

## 2021-09-15 NOTE — PROGRESS NOTES
(ADVIL;MOTRIN) 800 MG tablet Take 1 tablet by mouth every 8 hours as needed for Pain 90 tablet 3    pregabalin (LYRICA) 100 MG capsule Take 1 capsule by mouth nightly for 30 days. 30 capsule 0    methylPREDNISolone (MEDROL, CAL,) 4 MG tablet Use as directed 1 kit 0     No facility-administered medications prior to visit. SOCIAL/FAMILY/PAST MEDICAL HISTORY: Mr. Krysten Ni, family and past medical history was reviewed. REVIEW OF SYSTEMS:    Respiratory: Negative for apnea, chest tightness and shortness of breath or change in baseline breathing. Gastrointestinal: Negative for nausea, vomiting, abdominal pain, diarrhea, constipation, blood in stool and abdominal distention. PHYSICAL EXAM:   Nursing note and vitals reviewed. BP (!) 140/96 (Site: Right Upper Arm, Position: Sitting, Cuff Size: Large Adult)   Pulse 101   Temp 97.2 °F (36.2 °C)   Ht 5' 10\" (1.778 m)   Wt 220 lb (99.8 kg)   SpO2 98%   BMI 31.57 kg/m²   Constitutional: He appears well-developed and well-nourished. No acute distress. Skin: Skin is warm and dry, good turgor. No rash noted. He is not diaphoretic. Cardiovascular: Normal rate, regular rhythm, normal heart sounds, and does not have murmur. Pulmonary/Chest: Effort normal. No respiratory distress. He does not have wheezes in the lung fields. He has no rales. Neurological/Psychiatric:He is alert and oriented to person, place, and time. Coordination is  normal.  His mood isAppropriate and affect is Neutral/Euthymic(normal) . IMPRESSION:   1. Chronic pain syndrome    2. Chronic bilateral low back pain with bilateral sciatica    3. Lumbosacral spondylosis without myelopathy    4. Cervicalgia    5. Chronic pain of left ankle    6. Compression fracture of T12 vertebra, sequela    7. Back spasm    8.  Encounter for therapeutic drug monitoring        PLAN:  Informed verbal consent was obtained  -Continue with Norco, ZTlido  -Baclofen 10 mg tabs orally bid  -Kristie exercises/back stretches recommended  -Scheduled to f/u with orthopedics spine tomorrow  -CBT techniques- relaxation therapies such as biofeedback, mindfulness based stress reduction, imagery, cognitive restructuring, problem solving discussed with patient  -Advised patient to quit smoking for  health related concerns and to improve the treatment outcomes. Education was given on quitting smoking and the use of different modalities including medications, hypnotherapy, counselling  and biofeedback. These were discussed with patient.  -Last UDS 8/11/21 Consistent  -Return in about 4 weeks (around 10/13/2021). Current Outpatient Medications   Medication Sig Dispense Refill    HYDROcodone-acetaminophen (NORCO) 5-325 MG per tablet Take 1 tablet by mouth every 8 hours as needed for Pain for up to 28 days. Intended supply: 3 days. Take lowest dose possible to manage pain 84 tablet 0    Lidocaine 1.8 % PTCH Apply 1 patch topically daily 30 patch 0    baclofen (LIORESAL) 10 MG tablet Take 1 tablet by mouth 2 times daily 60 tablet 0    aspirin EC 81 MG EC tablet Take 1 tablet by mouth daily 30 tablet 5    ibuprofen (ADVIL;MOTRIN) 800 MG tablet Take 1 tablet by mouth every 8 hours as needed for Pain 90 tablet 3    pregabalin (LYRICA) 100 MG capsule Take 1 capsule by mouth nightly for 30 days. 30 capsule 0    methylPREDNISolone (MEDROL, CAL,) 4 MG tablet Use as directed 1 kit 0     No current facility-administered medications for this visit. I will continue his current medication regimen  which is part of the above treatment schedule.  It has been helping with Mr. Marice Leyden chronic  medical problems which for this visit include:   Diagnoses of Chronic pain syndrome, Chronic bilateral low back pain with bilateral sciatica, Lumbosacral spondylosis without myelopathy, Cervicalgia, Chronic pain of left ankle, Compression fracture of T12 vertebra, sequela, Back spasm, and Encounter for therapeutic drug monitoring were pertinent to this visit. Risks and benefits of the medications and other alternative treatments  including no treatment were discussed with the patient. The common side effects of these medications were also explained to the patient. Informed verbal consent was obtained. Goals of current treatment regimen include improvement in pain, restoration of functioning- with focus on improvement in physical performance, general activity, work or disability,emotional distress, health care utilization and  decreased medication consumption. Will continue to monitor progress towards achieving/maintaining therapeutic goals with special emphasis on  1. Improvement in perceived interfernce  of pain with ADL's. Ability to do home exercises independently. Ability to do household chores indoor and/or outdoor work and social and leisure activities. Improve psychosocial and physical functioning. - he is showing progression towards this treatment goal with the current regimen. He was advised against drinking alcohol with the narcotic pain medicines, advised against driving or handling machinery while adjusting the dose of medicines or if having cognitive  issues related to the current medications. Risk of overdose and death, if medicines not taken as prescribed, were also discussed. If the patient develops new symptoms or if the symptoms worsen, the patient should call the office. While transcribing every attempt was made to maintain the accuracy of the note in terms of it's contents,there may have been some errors made inadvertently. Thank you for allowing me to participate in the care of this patient. Talia Jarvis [facility].     Cc: Sherlyn Hale MD

## 2021-09-15 NOTE — PATIENT INSTRUCTIONS
Patient Education        Back Stretches: Exercises  Introduction  Here are some examples of exercises for stretching your back. Start each exercise slowly. Ease off the exercise if you start to have pain. Your doctor or physical therapist will tell you when you can start these exercises and which ones will work best for you. How to do the exercises  Overhead stretch   1. Stand comfortably with your feet shoulder-width apart. 2. Looking straight ahead, raise both arms over your head and reach toward the ceiling. Do not allow your head to tilt back. 3. Hold for 15 to 30 seconds, then lower your arms to your sides. 4. Repeat 2 to 4 times. Side stretch   1. Stand comfortably with your feet shoulder-width apart. 2. Raise one arm over your head, and then lean to the other side. 3. Slide your hand down your leg as you let the weight of your arm gently stretch your side muscles. Hold for 15 to 30 seconds. 4. Repeat 2 to 4 times on each side. Press-up   1. Lie on your stomach, supporting your body with your forearms. 2. Press your elbows down into the floor to raise your upper back. As you do this, relax your stomach muscles and allow your back to arch without using your back muscles. As your press up, do not let your hips or pelvis come off the floor. 3. Hold for 15 to 30 seconds, then relax. 4. Repeat 2 to 4 times. Relax and rest   1. Lie on your back with a rolled towel under your neck and a pillow under your knees. Extend your arms comfortably to your sides. 2. Relax and breathe normally. 3. Remain in this position for about 10 minutes. 4. If you can, do this 2 or 3 times each day. Follow-up care is a key part of your treatment and safety. Be sure to make and go to all appointments, and call your doctor if you are having problems. It's also a good idea to know your test results and keep a list of the medicines you take. Where can you learn more? Go to [email or web address] and sign in to your [employer] account. Enter P553 in the kSARIA box to learn more about \"Back Stretches: Exercises. \"     If you do not have an account, please click on the \"Sign Up Now\" link. Current as of: November 16, 2020               Content Version: 12.9  © 4994-2940 [employer]wise, Inc[employer]. Care instructions adapted under license by [facility] ([facility]). If you have questions about a medical condition or this instruction, always ask your healthcare professional. [address] any warranty or liability for your use of this information.

## 2021-10-12 ENCOUNTER — OFFICE VISIT (OUTPATIENT)
Dept: PAIN MANAGEMENT | Age: 51
End: 2021-10-12
Payer: COMMERCIAL

## 2021-10-12 VITALS
OXYGEN SATURATION: 97 % | RESPIRATION RATE: 16 BRPM | BODY MASS INDEX: 32.5 KG/M2 | DIASTOLIC BLOOD PRESSURE: 92 MMHG | HEART RATE: 88 BPM | HEIGHT: 70 IN | SYSTOLIC BLOOD PRESSURE: 142 MMHG | TEMPERATURE: 98.2 F | WEIGHT: 227 LBS

## 2021-10-12 DIAGNOSIS — G89.29 CHRONIC PAIN OF LEFT ANKLE: ICD-10-CM

## 2021-10-12 DIAGNOSIS — M47.817 LUMBOSACRAL SPONDYLOSIS WITHOUT MYELOPATHY: ICD-10-CM

## 2021-10-12 DIAGNOSIS — S22.080S COMPRESSION FRACTURE OF T12 VERTEBRA, SEQUELA: ICD-10-CM

## 2021-10-12 DIAGNOSIS — M54.41 CHRONIC BILATERAL LOW BACK PAIN WITH BILATERAL SCIATICA: ICD-10-CM

## 2021-10-12 DIAGNOSIS — Z51.81 ENCOUNTER FOR THERAPEUTIC DRUG MONITORING: ICD-10-CM

## 2021-10-12 DIAGNOSIS — G89.4 CHRONIC PAIN SYNDROME: ICD-10-CM

## 2021-10-12 DIAGNOSIS — M54.42 CHRONIC BILATERAL LOW BACK PAIN WITH BILATERAL SCIATICA: ICD-10-CM

## 2021-10-12 DIAGNOSIS — M54.2 CERVICALGIA: ICD-10-CM

## 2021-10-12 DIAGNOSIS — M62.830 BACK SPASM: ICD-10-CM

## 2021-10-12 DIAGNOSIS — G89.29 CHRONIC BILATERAL LOW BACK PAIN WITH BILATERAL SCIATICA: ICD-10-CM

## 2021-10-12 DIAGNOSIS — M25.572 CHRONIC PAIN OF LEFT ANKLE: ICD-10-CM

## 2021-10-12 PROCEDURE — G8484 FLU IMMUNIZE NO ADMIN: HCPCS | Performed by: NURSE PRACTITIONER

## 2021-10-12 PROCEDURE — G8427 DOCREV CUR MEDS BY ELIG CLIN: HCPCS | Performed by: NURSE PRACTITIONER

## 2021-10-12 PROCEDURE — 3017F COLORECTAL CA SCREEN DOC REV: CPT | Performed by: NURSE PRACTITIONER

## 2021-10-12 PROCEDURE — 4004F PT TOBACCO SCREEN RCVD TLK: CPT | Performed by: NURSE PRACTITIONER

## 2021-10-12 PROCEDURE — G8417 CALC BMI ABV UP PARAM F/U: HCPCS | Performed by: NURSE PRACTITIONER

## 2021-10-12 PROCEDURE — 99213 OFFICE O/P EST LOW 20 MIN: CPT | Performed by: NURSE PRACTITIONER

## 2021-10-12 RX ORDER — HYDROCODONE BITARTRATE AND ACETAMINOPHEN 5; 325 MG/1; MG/1
1 TABLET ORAL EVERY 8 HOURS PRN
Qty: 84 TABLET | Refills: 0 | Status: SHIPPED | OUTPATIENT
Start: 2021-10-12 | End: 2021-11-09 | Stop reason: SDUPTHER

## 2021-10-12 RX ORDER — BACLOFEN 10 MG/1
10 TABLET ORAL 2 TIMES DAILY
Qty: 60 TABLET | Refills: 0 | Status: SHIPPED | OUTPATIENT
Start: 2021-10-12 | End: 2021-11-09 | Stop reason: SDUPTHER

## 2021-10-12 NOTE — PROGRESS NOTES
Kassi Santos III  1970  [id number]      HISTORY OF PRESENT ILLNESS:  Mr. Keisha Grider is a 46 y.o. male returns for a follow up visit for pain management  He has a diagnosis of   1. Chronic pain syndrome    2. Lumbosacral spondylosis without myelopathy    3. Chronic bilateral low back pain with bilateral sciatica    4. Compression fracture of T12 vertebra, sequela    5. Back spasm    6. Cervicalgia    7. Chronic pain of left ankle    8. Encounter for therapeutic drug monitoring      On the Patients Pain Assessment form reviewed with the Medical Assistant:  He complains of pain in the both leg(s): entire area and bilateral mid-back He rates the pain 8/10 and describes it as sharp, aching, numbness, pins and needles. Current treatment regimen has helped relieve about 20% of the pain. He denies any side effects from the current pain regimen. Patient reports that since the last follow up visit the physical functioning is unchanged, family/social relationships are unchanged, mood is unchanged sleep patterns are unchanged, and that the overall functioning is unchanged. Patient denies misusing/abusing his narcotic pain medications or using any illegal drugs. There are No indicators for possible drug abuse, addiction or diversion problems. Upon obtaining medical history from Mr. Keisha Grider states that pain is manageable on current pain therapy. Mood is stable without anxiety. He is scheduled for CARLITO of the Lumbar spine with Dr. Cj Reyes. Sleep is fair with an average of 5-6 hours. Denies to having issues of constipation. Tolerating activities/house chores with moderate tenderness to the lower back. Smokes an average of 10-20 cigarettes a day    ALLERGIES: Patients list of allergies were reviewed     MEDICATIONS: Mr. Keisha Grider list of medications were reviewed. His current medications are   Outpatient Medications Prior to Visit   Medication Sig Dispense Refill    aspirin EC 81 MG EC tablet Take 1 tablet by mouth daily 30 tablet 5    ibuprofen (ADVIL;MOTRIN) 800 MG tablet Take 1 tablet by mouth every 8 hours as needed for Pain 90 tablet 3    HYDROcodone-acetaminophen (NORCO) 5-325 MG per tablet Take 1 tablet by mouth every 8 hours as needed for Pain for up to 28 days. Intended supply: 3 days. Take lowest dose possible to manage pain 84 tablet 0    Lidocaine 1.8 % PTCH Apply 1 patch topically daily 30 patch 0    baclofen (LIORESAL) 10 MG tablet Take 1 tablet by mouth 2 times daily 60 tablet 0    pregabalin (LYRICA) 100 MG capsule Take 1 capsule by mouth nightly for 30 days. 30 capsule 0    methylPREDNISolone (MEDROL, CAL,) 4 MG tablet Use as directed 1 kit 0     No facility-administered medications prior to visit. SOCIAL/FAMILY/PAST MEDICAL HISTORY: Mr. Shandra Griffith, family and past medical history was reviewed. REVIEW OF SYSTEMS:    Respiratory: Negative for apnea, chest tightness and shortness of breath or change in baseline breathing. Gastrointestinal: Negative for nausea, vomiting, abdominal pain, diarrhea, constipation, blood in stool and abdominal distention. PHYSICAL EXAM:   Nursing note and vitals reviewed. BP (!) 142/92 (Site: Right Upper Arm, Position: Sitting, Cuff Size: Medium Adult)   Pulse 88   Temp 98.2 °F (36.8 °C) (Temporal)   Resp 16   Ht 5' 10\" (1.778 m)   Wt 227 lb (103 kg)   SpO2 97%   BMI 32.57 kg/m²   Constitutional: He appears well-developed and well-nourished. No acute distress. Skin: Skin is warm and dry, good turgor. No rash noted. He is not diaphoretic. Cardiovascular: Normal rate, regular rhythm, normal heart sounds, and does not have murmur. Pulmonary/Chest: Effort normal. No respiratory distress. He does not have wheezes in the lung fields. He has no rales. Neurological/Psychiatric:He is alert and oriented to person, place, and time. Coordination is  normal.  His mood isAppropriate and affect is Neutral/Euthymic(normal) . IMPRESSION:   1. Chronic pain syndrome    2. Lumbosacral spondylosis without myelopathy    3. Chronic bilateral low back pain with bilateral sciatica    4. Compression fracture of T12 vertebra, sequela    5. Back spasm    6. Cervicalgia    7. Chronic pain of left ankle    8. Encounter for therapeutic drug monitoring        PLAN:  Informed verbal consent was obtained  -Continue with Kamla Rian, Baclofen  -Kristie exercises/ Back stretches recommended  -CBT techniques- relaxation therapies such as biofeedback, mindfulness based stress reduction, imagery, cognitive restructuring, problem solving discussed with patient  -Maintain f/u with orhopedics spine specialists for CARLITO of the Lumbar spine  -CBT techniques- relaxation therapies such as biofeedback, mindfulness based stress reduction, imagery, cognitive restructuring, problem solving discussed with patient  -Advised patient to quit smoking for  health related concerns and to improve the treatment outcomes. Education was given on quitting smoking and the use of different modalities including medications, hypnotherapy, counselling  and biofeedback. These were discussed with patient.  -Last UDS 8/11/21 Consistent  -Return in about 4 weeks (around 11/9/2021). Current Outpatient Medications   Medication Sig Dispense Refill    HYDROcodone-acetaminophen (NORCO) 5-325 MG per tablet Take 1 tablet by mouth every 8 hours as needed for Pain for up to 28 days. Intended supply: 3 days. Take lowest dose possible to manage pain 84 tablet 0    Lidocaine 1.8 % PTCH Apply 1 patch topically daily 30 patch 0    baclofen (LIORESAL) 10 MG tablet Take 1 tablet by mouth 2 times daily 60 tablet 0    aspirin EC 81 MG EC tablet Take 1 tablet by mouth daily 30 tablet 5    ibuprofen (ADVIL;MOTRIN) 800 MG tablet Take 1 tablet by mouth every 8 hours as needed for Pain 90 tablet 3    pregabalin (LYRICA) 100 MG capsule Take 1 capsule by mouth nightly for 30 days.  30 capsule 0    methylPREDNISolone worsen, the patient should call the office. While transcribing every attempt was made to maintain the accuracy of the note in terms of it's contents,there may have been some errors made inadvertently. Thank you for allowing me to participate in the care of this patient.     Che Riojas, FUNMI    Cc: Delmar Justice MD

## 2021-10-12 NOTE — PATIENT INSTRUCTIONS
Patient Education        Back Stretches: Exercises  Introduction  Here are some examples of exercises for stretching your back. Start each exercise slowly. Ease off the exercise if you start to have pain. Your doctor or physical therapist will tell you when you can start these exercises and which ones will work best for you. How to do the exercises  Overhead stretch    1. Stand comfortably with your feet shoulder-width apart. 2. Looking straight ahead, raise both arms over your head and reach toward the ceiling. Do not allow your head to tilt back. 3. Hold for 15 to 30 seconds, then lower your arms to your sides. 4. Repeat 2 to 4 times. Side stretch    1. Stand comfortably with your feet shoulder-width apart. 2. Raise one arm over your head, and then lean to the other side. 3. Slide your hand down your leg as you let the weight of your arm gently stretch your side muscles. Hold for 15 to 30 seconds. 4. Repeat 2 to 4 times on each side. Press-up    1. Lie on your stomach, supporting your body with your forearms. 2. Press your elbows down into the floor to raise your upper back. As you do this, relax your stomach muscles and allow your back to arch without using your back muscles. As your press up, do not let your hips or pelvis come off the floor. 3. Hold for 15 to 30 seconds, then relax. 4. Repeat 2 to 4 times. Relax and rest    1. Lie on your back with a rolled towel under your neck and a pillow under your knees. Extend your arms comfortably to your sides. 2. Relax and breathe normally. 3. Remain in this position for about 10 minutes. 4. If you can, do this 2 or 3 times each day. Follow-up care is a key part of your treatment and safety. Be sure to make and go to all appointments, and call your doctor if you are having problems. It's also a good idea to know your test results and keep a list of the medicines you take. Where can you learn more? Go to https://satisheb.healthCoupa Software. org and sign in to your [employer] account. Enter W952 in the [employer] box to learn more about \"Back Stretches: Exercises. \"     If you do not have an account, please click on the \"Sign Up Now\" link. Current as of: July 1, 2021               Content Version: 13.0  © 5329-2442 Healthwise, Incorporated. Care instructions adapted under license by [facility] ([facility]). If you have questions about a medical condition or this instruction, always ask your healthcare professional. [address] any warranty or liability for your use of this information.

## 2021-11-03 NOTE — PROGRESS NOTES
PATIENT REACHED   YES__X_NO____    PREOP INSTRUCTIONS LEFT ON VM NUMBER_______________      DATE_11/10/2021________ TIME___1345______ARRIVAL__1245______PLACE__[facility]__________  NOTHING TO EAT OR DRINK  AFTER MIDNIGHT THE EVENING PRIOR OR AS INSTRUCTED BY YOUR DR.  Jolie Salinas NEED A RESPONSIBLE ADULT AGE 18 OR OLDER TO DRIVE YOU HOME  PLEASE BRING INSURANCE CARD. PICTURE ID AND COMPLETE LIST OF MEDS  WEAR LOOSE COMFORTABLE CLOTHING  FOLLOW ANY INSTRUCTIONS YOUR DRS OFFICE HAS GIVEN YOU,INCLUDING WHAT MEDICATIONS TO TAKE THE AM OF PROCEDURE AND WHEN AND IF YOU NEED TO STOP ANY BLOOD THINNERS. IF YOU HAVE QUESTIONS REGARDING THIS CALL THE OFFICE  THE GOAL BLOOD SUGAR THE AM OF PROCEDURE  OR LESS ABOVE THAT THE PROCEDURE MAY BE CANCELLED  ANY QUESTIONS CALL YOUR DOCTOR. ALSO,PLEASE READ THE INSTRUCTION PACKET FROM YOUR DR IF YOU RECEIVED ONE. SPINE INTERVENTION NUMBER [phone]      OTHER___________________________________      VISITOR POLICY(subject to change)      There is a one visitor policy at [facility] for all surgeries and endoscopies. Whether the visitor can stay or will be asked to wait in the car will depend on the current policy and if social distancing can be maintained. The policy is subject to change at any time. Please make sure the visitor has a cell phone that is on,charged and able to accept calls, as this may be the way that the staff communicates with them. Pain management is NO VISITOR policyThe patients ride is expected to remain in the car with a cell phone for communication. If the ride is leaving the hospital grounds please make sure they are back in time for pickup. Have the patient inform the staff on arrival what their rides plans are while the patient is in the facility. At the MAIN there is one visitor allowed. Please note that the visitor policy is subject to change.

## 2021-11-09 ENCOUNTER — OFFICE VISIT (OUTPATIENT)
Dept: PAIN MANAGEMENT | Age: 51
End: 2021-11-09
Payer: COMMERCIAL

## 2021-11-09 VITALS
BODY MASS INDEX: 33 KG/M2 | HEART RATE: 111 BPM | SYSTOLIC BLOOD PRESSURE: 124 MMHG | OXYGEN SATURATION: 95 % | TEMPERATURE: 97.5 F | WEIGHT: 230 LBS | DIASTOLIC BLOOD PRESSURE: 89 MMHG

## 2021-11-09 DIAGNOSIS — M47.817 LUMBOSACRAL SPONDYLOSIS WITHOUT MYELOPATHY: ICD-10-CM

## 2021-11-09 DIAGNOSIS — M25.572 CHRONIC PAIN OF LEFT ANKLE: ICD-10-CM

## 2021-11-09 DIAGNOSIS — M62.830 BACK SPASM: ICD-10-CM

## 2021-11-09 DIAGNOSIS — G89.29 CHRONIC BILATERAL LOW BACK PAIN WITH BILATERAL SCIATICA: ICD-10-CM

## 2021-11-09 DIAGNOSIS — G89.4 CHRONIC PAIN SYNDROME: ICD-10-CM

## 2021-11-09 DIAGNOSIS — M54.41 CHRONIC BILATERAL LOW BACK PAIN WITH BILATERAL SCIATICA: ICD-10-CM

## 2021-11-09 DIAGNOSIS — S22.080S COMPRESSION FRACTURE OF T12 VERTEBRA, SEQUELA: ICD-10-CM

## 2021-11-09 DIAGNOSIS — G89.29 CHRONIC PAIN OF LEFT ANKLE: ICD-10-CM

## 2021-11-09 DIAGNOSIS — M54.42 CHRONIC BILATERAL LOW BACK PAIN WITH BILATERAL SCIATICA: ICD-10-CM

## 2021-11-09 DIAGNOSIS — M54.2 CERVICALGIA: ICD-10-CM

## 2021-11-09 PROCEDURE — G8484 FLU IMMUNIZE NO ADMIN: HCPCS | Performed by: NURSE PRACTITIONER

## 2021-11-09 PROCEDURE — G8417 CALC BMI ABV UP PARAM F/U: HCPCS | Performed by: NURSE PRACTITIONER

## 2021-11-09 PROCEDURE — 4004F PT TOBACCO SCREEN RCVD TLK: CPT | Performed by: NURSE PRACTITIONER

## 2021-11-09 PROCEDURE — 99213 OFFICE O/P EST LOW 20 MIN: CPT | Performed by: NURSE PRACTITIONER

## 2021-11-09 PROCEDURE — 3017F COLORECTAL CA SCREEN DOC REV: CPT | Performed by: NURSE PRACTITIONER

## 2021-11-09 PROCEDURE — G8427 DOCREV CUR MEDS BY ELIG CLIN: HCPCS | Performed by: NURSE PRACTITIONER

## 2021-11-09 RX ORDER — BACLOFEN 10 MG/1
10 TABLET ORAL 2 TIMES DAILY
Qty: 60 TABLET | Refills: 0 | Status: SHIPPED | OUTPATIENT
Start: 2021-11-09 | End: 2021-12-08 | Stop reason: SDUPTHER

## 2021-11-09 RX ORDER — HYDROCODONE BITARTRATE AND ACETAMINOPHEN 5; 325 MG/1; MG/1
1 TABLET ORAL EVERY 8 HOURS PRN
Qty: 84 TABLET | Refills: 0 | Status: SHIPPED | OUTPATIENT
Start: 2021-11-09 | End: 2021-12-08 | Stop reason: SDUPTHER

## 2021-11-09 NOTE — PROGRESS NOTES
Lambertodez Goodell III  1970  [id number]      HISTORY OF PRESENT ILLNESS: Mr. Alan Shukla is a 46 y.o. male returns for a follow up visit for pain management  He has a diagnosis of   1. Chronic pain syndrome    2. Chronic bilateral low back pain with bilateral sciatica    3. Lumbosacral spondylosis without myelopathy    4. Compression fracture of T12 vertebra, sequela    5. Cervicalgia    6. Chronic pain of left ankle    7. Back spasm    . As per Information Obtained from the PADT (Patient Assessment and Documentation Tool)    He complains of pain in the upper mid lower back, both legs. He rates the pain 8/10 and describes it as dull, aching, pins and needles, radiating to upper leg(s) bilaterally, knee(s) bilaterally, lower leg(s) bilaterally, ankle(s) bilaterally, feet bilaterally. Current treatment regimen has helped relieve about 20% of the pain. He denies any side effects from the current pain regimen. Patient reports that since the last follow up visit the physical functioning is unchanged, family/social relationships are unchanged, mood is unchanged sleep patterns are unchanged, and that the overall functioning is unchanged. Patient denies misusing/abusing his narcotic pain medications or using any illegal drugs. Upon obtaining medical history from Mr. Alan Shukla states that pain is manageable on current pain therapy. Takes pain medications as prescribed. Scheduled for Lumbae CARLITO with Dr. Jaspal Smith tomorrow. Mood is stable without anxiety. Sleep is fair with an average of 5-6 hours. Denies to having issues of constipation. Tolerating activities/house chores with moderate tenderness to the lower back. Smokes 1/2 pack of cigarettes a day    ALLERGIES: Patients list of allergies were reviewed     MEDICATIONS: Mr. Alna Shukla list of medications were reviewed. His current medications are   Outpatient Medications Prior to Visit   Medication Sig Dispense Refill    aspirin EC 81 MG EC tablet Take 1 tablet spondylosis without myelopathy    4. Compression fracture of T12 vertebra, sequela    5. Cervicalgia    6. Chronic pain of left ankle    7. Back spasm        PLAN:  Informed verbal consent was obtained  -Continue with Norco, ZTlido, Baclofen  -Kristie exercises/back stretches  -Maintain f/u with Dr. Jessie Candelaria for CARLITO of the Lumbar spine  -CBT techniques- relaxation therapies such as biofeedback, mindfulness based stress reduction, imagery, cognitive restructuring, problem solving discussed with patient  -Advised patient to quit smoking for  health related concerns and to improve the treatment outcomes. Education was given on quitting smoking and the use of different modalities including medications, hypnotherapy, counselling  and biofeedback. These were discussed with patient.  -Last UDS 10/12/21 Consistent  -Return in about 4 weeks (around 12/7/2021). -[place] record was obtained and reviewed  for the last one year and no indicators of drug misuse  were found. Any other controlled substance prescriptions  seen on the record have been accounted for, I am aware of the patient receiving these medications. Rashawn Pleitez [place] record will be rechecked as part of office protocol. Current Outpatient Medications   Medication Sig Dispense Refill    Lidocaine 1.8 % PTCH Apply 1 patch topically daily 30 patch 0    HYDROcodone-acetaminophen (NORCO) 5-325 MG per tablet Take 1 tablet by mouth every 8 hours as needed for Pain for up to 28 days. Intended supply: 3 days. Take lowest dose possible to manage pain 84 tablet 0    baclofen (LIORESAL) 10 MG tablet Take 1 tablet by mouth 2 times daily 60 tablet 0    aspirin EC 81 MG EC tablet Take 1 tablet by mouth daily 30 tablet 5    ibuprofen (ADVIL;MOTRIN) 800 MG tablet Take 1 tablet by mouth every 8 hours as needed for Pain 90 tablet 3     No current facility-administered medications for this visit.      I will continue his current medication regimen  which is part of the above treatment schedule. It has been helping with Mr. Britney Velazquez chronic  medical problems which for this visit include:   Diagnoses of Chronic pain syndrome, Chronic bilateral low back pain with bilateral sciatica, Lumbosacral spondylosis without myelopathy, Compression fracture of T12 vertebra, sequela, Cervicalgia, Chronic pain of left ankle, and Back spasm were pertinent to this visit. Risks and benefits of the medications and other alternative treatments  including no treatment were discussed with the patient. The common side effects of these medications were also explained to the patient. Informed verbal consent was obtained. Goals of current treatment regimen include improvement in pain, restoration of functioning- with focus on improvement in physical performance, general activity, work or disability,emotional distress, health care utilization and  decreased medication consumption. Will continue to monitor progress towards achieving/maintaining therapeutic goals with special emphasis on  1. Improvement in perceived interfernce  of pain with ADL's. Ability to do home exercises independently. Ability to do household chores indoor and/or outdoor work and social and leisure activities. Improve psychosocial and physical functioning. - he is showing progression towards this treatment goal with the current regimen. He was advised against drinking alcohol with the narcotic pain medicines, advised against driving or handling machinery while adjusting the dose of medicines or if having cognitive  issues related to the current medications. Risk of overdose and death, if medicines not taken as prescribed, were also discussed. If the patient develops new symptoms or if the symptoms worsen, the patient should call the office. While transcribing every attempt was made to maintain the accuracy of the note in terms of it's contents,there may have been some errors made inadvertently.   Thank you for allowing me to participate in the care of this patient. Roberto Jackson CNP.     Cc: Pelon Staples MD

## 2021-11-09 NOTE — PATIENT INSTRUCTIONS
Patient Education        Back Stretches: Exercises  Introduction  Here are some examples of exercises for stretching your back. Start each exercise slowly. Ease off the exercise if you start to have pain. Your doctor or physical therapist will tell you when you can start these exercises and which ones will work best for you. How to do the exercises  Overhead stretch    1. Stand comfortably with your feet shoulder-width apart. 2. Looking straight ahead, raise both arms over your head and reach toward the ceiling. Do not allow your head to tilt back. 3. Hold for 15 to 30 seconds, then lower your arms to your sides. 4. Repeat 2 to 4 times. Side stretch    1. Stand comfortably with your feet shoulder-width apart. 2. Raise one arm over your head, and then lean to the other side. 3. Slide your hand down your leg as you let the weight of your arm gently stretch your side muscles. Hold for 15 to 30 seconds. 4. Repeat 2 to 4 times on each side. Press-up    1. Lie on your stomach, supporting your body with your forearms. 2. Press your elbows down into the floor to raise your upper back. As you do this, relax your stomach muscles and allow your back to arch without using your back muscles. As your press up, do not let your hips or pelvis come off the floor. 3. Hold for 15 to 30 seconds, then relax. 4. Repeat 2 to 4 times. Relax and rest    1. Lie on your back with a rolled towel under your neck and a pillow under your knees. Extend your arms comfortably to your sides. 2. Relax and breathe normally. 3. Remain in this position for about 10 minutes. 4. If you can, do this 2 or 3 times each day. Follow-up care is a key part of your treatment and safety. Be sure to make and go to all appointments, and call your doctor if you are having problems. It's also a good idea to know your test results and keep a list of the medicines you take. Where can you learn more? Go to https://satisheb.healthWeather Trends International. org and sign in to your [employer] account. Enter X949 in the [employer] box to learn more about \"Back Stretches: Exercises. \"     If you do not have an account, please click on the \"Sign Up Now\" link. Current as of: July 1, 2021               Content Version: 13.0  © 2076-0112 Healthwise, Incorporated. Care instructions adapted under license by [facility] ([facility]). If you have questions about a medical condition or this instruction, always ask your healthcare professional. [address] any warranty or liability for your use of this information.

## 2021-11-10 ENCOUNTER — HOSPITAL ENCOUNTER (OUTPATIENT)
Age: 51
Setting detail: OUTPATIENT SURGERY
Discharge: HOME | End: 2021-11-10
Attending: PHYSICAL MEDICINE & REHABILITATION | Admitting: PHYSICAL MEDICINE & REHABILITATION
Payer: COMMERCIAL

## 2021-11-10 ENCOUNTER — APPOINTMENT (OUTPATIENT)
Dept: GENERAL RADIOLOGY | Age: 51
End: 2021-11-10
Attending: PHYSICAL MEDICINE & REHABILITATION
Payer: COMMERCIAL

## 2021-11-10 VITALS
OXYGEN SATURATION: 98 % | TEMPERATURE: 98 F | WEIGHT: 228 LBS | SYSTOLIC BLOOD PRESSURE: 122 MMHG | RESPIRATION RATE: 11 BRPM | HEART RATE: 90 BPM | BODY MASS INDEX: 31.92 KG/M2 | HEIGHT: 71 IN | DIASTOLIC BLOOD PRESSURE: 88 MMHG

## 2021-11-10 PROCEDURE — 2500000003 HC RX 250 WO HCPCS: Performed by: PHYSICAL MEDICINE & REHABILITATION

## 2021-11-10 PROCEDURE — 2709999900 HC NON-CHARGEABLE SUPPLY: Performed by: PHYSICAL MEDICINE & REHABILITATION

## 2021-11-10 PROCEDURE — 77003 FLUOROGUIDE FOR SPINE INJECT: CPT

## 2021-11-10 PROCEDURE — 99152 MOD SED SAME PHYS/QHP 5/>YRS: CPT | Performed by: PHYSICAL MEDICINE & REHABILITATION

## 2021-11-10 PROCEDURE — 6360000002 HC RX W HCPCS: Performed by: PHYSICAL MEDICINE & REHABILITATION

## 2021-11-10 PROCEDURE — 3610000056 HC PAIN LEVEL 4 BASE (NON-OR): Performed by: PHYSICAL MEDICINE & REHABILITATION

## 2021-11-10 RX ORDER — MIDAZOLAM HYDROCHLORIDE 1 MG/ML
INJECTION INTRAMUSCULAR; INTRAVENOUS
Status: COMPLETED | OUTPATIENT
Start: 2021-11-10 | End: 2021-11-10

## 2021-11-10 RX ORDER — DEXAMETHASONE SODIUM PHOSPHATE 10 MG/ML
INJECTION, SOLUTION INTRAMUSCULAR; INTRAVENOUS
Status: COMPLETED | OUTPATIENT
Start: 2021-11-10 | End: 2021-11-10

## 2021-11-10 RX ORDER — LIDOCAINE HYDROCHLORIDE 10 MG/ML
INJECTION, SOLUTION EPIDURAL; INFILTRATION; INTRACAUDAL; PERINEURAL
Status: COMPLETED | OUTPATIENT
Start: 2021-11-10 | End: 2021-11-10

## 2021-11-10 RX ORDER — FENTANYL CITRATE 50 UG/ML
INJECTION, SOLUTION INTRAMUSCULAR; INTRAVENOUS
Status: COMPLETED | OUTPATIENT
Start: 2021-11-10 | End: 2021-11-10

## 2021-11-10 ASSESSMENT — PAIN SCALES - GENERAL
PAINLEVEL_OUTOF10: 8
PAINLEVEL_OUTOF10: 8

## 2021-11-10 ASSESSMENT — PAIN - FUNCTIONAL ASSESSMENT
PAIN_FUNCTIONAL_ASSESSMENT: PREVENTS OR INTERFERES SOME ACTIVE ACTIVITIES AND ADLS
PAIN_FUNCTIONAL_ASSESSMENT: 0-10

## 2021-11-10 NOTE — OP NOTE
PATIENT:  Radha Summers  AGE:  46 yrs  MEDICAL RECORD #:  [id number]  YOB: 1970     DATE:  11/10/2021  PHYSICIAN: Ligia Khoury M.D. PROCEDURE: Bilateral L5 transforaminal epidural steroid injection under fluoroscopy. PRE-OP DIAGNOSIS:  Low Back Pain/Radiculopathy     POST-OP DIAGNOSIS:  same     HISTORY OF PRESENT ILLNESS:  See office notes. Patient has failed previous less-invasive treatments. ALLERGIES:  Butrans [buprenorphine] and Codeine     MEDICATIONS:    No current facility-administered medications for this encounter. PHYSICAL EXAMINATION:              General:  Awake, alert              Heart:  No audible murmurs, extremities well perfused              Lungs:  No increased WOB or audible wheezing              Extremities:  Normal tone. Warm. No swelling. Anesthesia: 1 mg Versed and 50 mcg fentanyl    Estimated blood loss: None    DESCRIPTION OF PROCEDURE:     Components of the procedure were again reviewed with the patient prior to the procedure. He is aware of risks including infection, bleeding, allergic reaction, and nerve injury. He had ample opportunity for additional questions. He elected to proceed with treatment. The patient was placed in the prone position. Cardiovascular monitoring was initiated, and vital signs were stable prior to, during, and after the procedure. Utilizing fluoroscopy, the L5 vertebrae was identified. The area was sterilely prepped and draped. Skin anesthesia was achieved at each entry site using 1-2 cc of Lidocaine 1%. A 22 g 5.0 inch spinal needle was slowly inserted into the bilateral L5 neuroforamen using AP, lateral and oblique fluoroscopic imaging. Negative aspiration was confirmed. 1 cc Isovue-M 300 was injected at each site showing contrast spread into the epidural space and along the nerve root. A combination of 1 cc Lidocaine 1% and 10 mg dexamethasone were slowly injected at each site.  The needles were removed after the stylets were repositioned. A sterile bandage was applied. The patient was brought to recovery in stable condition. The patient tolerated the procedure well. DISPOSITION:  The patient was transported to recovery. The patient was monitored for 15 to 20 minutes post-procedure. Precautions were discussed and written instructions provided. Comment: Good epidural flow bilaterally.

## 2021-11-10 NOTE — H&P
HISTORY AND PHYSICAL/PRE-SEDATION ASSESSMENT    Patient:  Alberto Young   :  1970  Medical Record No.:  [id number]   Date:  11/10/2021  Physician:  Pauline Bravo MD  Facility: [address] [place] [address] Drive:                 The patient is a 46 y.o. male whom presents with low back pain. Review of the imaging and physical exam of the patient confirmed the pre-procedure diagnosis. After a thorough discussion of risks, benefits and alternatives informed consent was obtained. Diagnosis:  M47.816  LUMBAR SPONDYLOSIS WITHOUT RADICULOPATHY OR MYELOPATHY    Past Medical History:   History reviewed. No pertinent past medical history. Past Surgical History:     History reviewed. No pertinent surgical history. Current Medications:   Prior to Admission medications    Medication Sig Start Date End Date Taking? Authorizing Provider   HYDROcodone-acetaminophen (NORCO) 5-325 MG per tablet Take 1 tablet by mouth every 8 hours as needed for Pain for up to 28 days. Intended supply: 3 days. Take lowest dose possible to manage pain 21 Yes MARGARITA Mcmahan CNP   baclofen (LIORESAL) 10 MG tablet Take 1 tablet by mouth 2 times daily 21 Yes MARGARITA Mcmahan CNP   Lidocaine 1.8 % PTCH Apply 1 patch topically daily 21  MARGARITA Mcmahan CNP   aspirin EC 81 MG EC tablet Take 1 tablet by mouth daily 20   Erin Barclay MD   ibuprofen (ADVIL;MOTRIN) 800 MG tablet Take 1 tablet by mouth every 8 hours as needed for Pain 19   Erin Barclay MD       Allergies:  Butrans [buprenorphine] and Codeine    Social History:    reports that he has been smoking cigarettes. He has a 25.00 pack-year smoking history. He has never used smokeless tobacco. He reports current alcohol use. He reports that he does not use drugs. Family History:   History reviewed. No pertinent family history.      Vitals: Blood pressure

## 2021-12-08 ENCOUNTER — OFFICE VISIT (OUTPATIENT)
Dept: PAIN MANAGEMENT | Age: 51
End: 2021-12-08
Payer: COMMERCIAL

## 2021-12-08 VITALS
DIASTOLIC BLOOD PRESSURE: 96 MMHG | HEIGHT: 71 IN | HEART RATE: 108 BPM | WEIGHT: 234 LBS | BODY MASS INDEX: 32.76 KG/M2 | OXYGEN SATURATION: 96 % | TEMPERATURE: 97.2 F | SYSTOLIC BLOOD PRESSURE: 149 MMHG

## 2021-12-08 DIAGNOSIS — G89.29 CHRONIC BILATERAL LOW BACK PAIN WITH BILATERAL SCIATICA: ICD-10-CM

## 2021-12-08 DIAGNOSIS — M54.2 CERVICALGIA: ICD-10-CM

## 2021-12-08 DIAGNOSIS — E66.09 CLASS 1 OBESITY DUE TO EXCESS CALORIES WITHOUT SERIOUS COMORBIDITY WITH BODY MASS INDEX (BMI) OF 32.0 TO 32.9 IN ADULT: ICD-10-CM

## 2021-12-08 DIAGNOSIS — M54.41 CHRONIC BILATERAL LOW BACK PAIN WITH BILATERAL SCIATICA: ICD-10-CM

## 2021-12-08 DIAGNOSIS — M25.572 CHRONIC PAIN OF LEFT ANKLE: ICD-10-CM

## 2021-12-08 DIAGNOSIS — G89.4 CHRONIC PAIN SYNDROME: ICD-10-CM

## 2021-12-08 DIAGNOSIS — M47.817 LUMBOSACRAL SPONDYLOSIS WITHOUT MYELOPATHY: ICD-10-CM

## 2021-12-08 DIAGNOSIS — M62.830 BACK SPASM: ICD-10-CM

## 2021-12-08 DIAGNOSIS — S22.080S COMPRESSION FRACTURE OF T12 VERTEBRA, SEQUELA: ICD-10-CM

## 2021-12-08 DIAGNOSIS — M54.42 CHRONIC BILATERAL LOW BACK PAIN WITH BILATERAL SCIATICA: ICD-10-CM

## 2021-12-08 DIAGNOSIS — G89.29 CHRONIC PAIN OF LEFT ANKLE: ICD-10-CM

## 2021-12-08 PROCEDURE — 4004F PT TOBACCO SCREEN RCVD TLK: CPT | Performed by: NURSE PRACTITIONER

## 2021-12-08 PROCEDURE — G8417 CALC BMI ABV UP PARAM F/U: HCPCS | Performed by: NURSE PRACTITIONER

## 2021-12-08 PROCEDURE — G8427 DOCREV CUR MEDS BY ELIG CLIN: HCPCS | Performed by: NURSE PRACTITIONER

## 2021-12-08 PROCEDURE — 3017F COLORECTAL CA SCREEN DOC REV: CPT | Performed by: NURSE PRACTITIONER

## 2021-12-08 PROCEDURE — G8484 FLU IMMUNIZE NO ADMIN: HCPCS | Performed by: NURSE PRACTITIONER

## 2021-12-08 PROCEDURE — 99213 OFFICE O/P EST LOW 20 MIN: CPT | Performed by: NURSE PRACTITIONER

## 2021-12-08 RX ORDER — BACLOFEN 10 MG/1
10 TABLET ORAL 2 TIMES DAILY
Qty: 60 TABLET | Refills: 0 | Status: SHIPPED | OUTPATIENT
Start: 2021-12-08 | End: 2022-01-05 | Stop reason: SDUPTHER

## 2021-12-08 RX ORDER — HYDROCODONE BITARTRATE AND ACETAMINOPHEN 5; 325 MG/1; MG/1
1 TABLET ORAL EVERY 8 HOURS PRN
Qty: 90 TABLET | Refills: 0 | Status: SHIPPED | OUTPATIENT
Start: 2021-12-08 | End: 2022-01-05 | Stop reason: SDUPTHER

## 2021-12-08 NOTE — PATIENT INSTRUCTIONS
Patient Education        Back Stretches: Exercises  Introduction  Here are some examples of exercises for stretching your back. Start each exercise slowly. Ease off the exercise if you start to have pain. Your doctor or physical therapist will tell you when you can start these exercises and which ones will work best for you. How to do the exercises  Overhead stretch    1. Stand comfortably with your feet shoulder-width apart. 2. Looking straight ahead, raise both arms over your head and reach toward the ceiling. Do not allow your head to tilt back. 3. Hold for 15 to 30 seconds, then lower your arms to your sides. 4. Repeat 2 to 4 times. Side stretch    1. Stand comfortably with your feet shoulder-width apart. 2. Raise one arm over your head, and then lean to the other side. 3. Slide your hand down your leg as you let the weight of your arm gently stretch your side muscles. Hold for 15 to 30 seconds. 4. Repeat 2 to 4 times on each side. Press-up    1. Lie on your stomach, supporting your body with your forearms. 2. Press your elbows down into the floor to raise your upper back. As you do this, relax your stomach muscles and allow your back to arch without using your back muscles. As your press up, do not let your hips or pelvis come off the floor. 3. Hold for 15 to 30 seconds, then relax. 4. Repeat 2 to 4 times. Relax and rest    1. Lie on your back with a rolled towel under your neck and a pillow under your knees. Extend your arms comfortably to your sides. 2. Relax and breathe normally. 3. Remain in this position for about 10 minutes. 4. If you can, do this 2 or 3 times each day. Follow-up care is a key part of your treatment and safety. Be sure to make and go to all appointments, and call your doctor if you are having problems. It's also a good idea to know your test results and keep a list of the medicines you take. Where can you learn more? Go to https://satisheb.healthElastic Intelligence. org and sign in to your [employer] account. Enter O031 in the [employer] box to learn more about \"Back Stretches: Exercises. \"     If you do not have an account, please click on the \"Sign Up Now\" link. Current as of: July 1, 2021               Content Version: 13.0  © 4742-8552 Healthwise, Incorporated. Care instructions adapted under license by [facility] [address] If you have questions about a medical condition or this instruction, always ask your healthcare professional. [address] any warranty or liability for your use of this information.

## 2021-12-08 NOTE — PROGRESS NOTES
Collin Cobos III  1970  [id number]      HISTORY OF PRESENT ILLNESS:  Mr. Laurita Oneill is a 46 y.o. male returns for a follow up visit for pain management  He has a diagnosis of   1. Chronic pain syndrome    2. Lumbosacral spondylosis without myelopathy    3. Chronic bilateral low back pain with bilateral sciatica    4. Compression fracture of T12 vertebra, sequela    5. Cervicalgia    6. Back spasm    7. Chronic pain of left ankle    8. Class 1 obesity due to excess calories without serious comorbidity with body mass index (BMI) of 32.0 to 32.9 in adult      On the Patients Pain Assessment form:  He complains of pain in the upper, mid, lower back, down both legs He rates the pain 10/10 and describes it as aching, burning, numbness, pins and needles, stabbing. Current treatment regimen has helped relieve about 20% of the pain. He denies any side effects from the current pain regimen. Patient reports that since the last follow up visit the physical functioning is unchanged, family/social relationships are unchanged, mood is unchanged sleep patterns are unchanged, and that the overall functioning is unchanged. Patient denies misusing/abusing his narcotic pain medications or using any illegal drugs. Upon obtaining medical history from Mr. Laurita Oneill states that pain is manageable on current pain therapy. He received CARLITO of the Lumbar spine on 11/20/21 with Dr. Cherylene Daily with minimal relief. Reports that seemed worsen after the injection for 3 weeks, currently stable. Reports a freind of his is tryng for stem cell treatment for Lumbar pain, he will consider this once his friend confirms effectivity. Mood is stable without anxiety. Sleep is fair with an average of 5-6 hours. Denies to having issues of constipation. Tolerating activities/house chores with moderate tenderness to the lower back.  Smokes 1 pack of cigarettes a day    ALLERGIES: Patients list of allergies were reviewed     MEDICATIONS: Mr. Laurita Oneill list of medications were reviewed. His current medications are   Outpatient Medications Prior to Visit   Medication Sig Dispense Refill    aspirin EC 81 MG EC tablet Take 1 tablet by mouth daily 30 tablet 5    ibuprofen (ADVIL;MOTRIN) 800 MG tablet Take 1 tablet by mouth every 8 hours as needed for Pain 90 tablet 3    Lidocaine 1.8 % PTCH Apply 1 patch topically daily 30 patch 0    baclofen (LIORESAL) 10 MG tablet Take 1 tablet by mouth 2 times daily 60 tablet 0     No facility-administered medications prior to visit. SOCIAL/FAMILY/PAST MEDICAL HISTORY: Mr. Geo Tran, family and past medical history was reviewed. REVIEW OF SYSTEMS:    Respiratory: Negative for apnea, chest tightness and shortness of breath or change in baseline breathing. Gastrointestinal: Negative for nausea, vomiting, abdominal pain, diarrhea, constipation, blood in stool and abdominal distention. PHYSICAL EXAM:   Nursing note and vitals reviewed. BP (!) 149/96   Pulse 108   Temp 97.2 °F (36.2 °C)   Ht 5' 11\" (1.803 m)   Wt 234 lb (106.1 kg)   SpO2 96%   BMI 32.64 kg/m²   Constitutional: He appears well-developed and well-nourished. No acute distress. Skin: Skin is warm and dry, good turgor. No rash noted. He is not diaphoretic. Cardiovascular: Normal rate, regular rhythm, normal heart sounds, and does not have murmur. Pulmonary/Chest: Effort normal. No respiratory distress. He does not have wheezes in the lung fields. He has no rales. Neurological/Psychiatric:He is alert and oriented to person, place, and time. Coordination is  normal.  His mood isAppropriate and affect is Neutral/Euthymic(normal) . IMPRESSION:   1. Chronic pain syndrome    2. Lumbosacral spondylosis without myelopathy    3. Chronic bilateral low back pain with bilateral sciatica    4. Compression fracture of T12 vertebra, sequela    5. Cervicalgia    6. Back spasm    7. Chronic pain of left ankle    8.  Class 1 obesity due to excess calories without serious comorbidity with body mass index (BMI) of 32.0 to 32.9 in adult        PLAN:  Informed verbal consent was obtained  -Continue with Norco, ZTlido, Baclofen  -Kristie exercises/back stretches recommended  -Maintain f/u with Dr. Daniela Duran for Lumbar spine degeneration  -Monitor BP/HR, f/u with PCP if it continues to stay elevated or he becomes symptomatic with SOB, CP, syncopy. he is currently asymptomatic  -CBT techniques- relaxation therapies such as biofeedback, mindfulness based stress reduction, imagery, cognitive restructuring, problem solving discussed with patient  -He was advised weight reduction, diet changes- 800-1200 jen diet, diet diary, exercising, nutritional  consult increased physical activity as tolerated  -Last UDS 11/10/21 Consistent  -Return in about 4 weeks (around 1/5/2022). Current Outpatient Medications   Medication Sig Dispense Refill    Lidocaine 1.8 % PTCH Apply 1 patch topically daily 30 patch 0    baclofen (LIORESAL) 10 MG tablet Take 1 tablet by mouth 2 times daily 60 tablet 0    HYDROcodone-acetaminophen (NORCO) 5-325 MG per tablet Take 1 tablet by mouth every 8 hours as needed for Pain for up to 30 days. Intended supply: 3 days. Take lowest dose possible to manage pain 90 tablet 0    aspirin EC 81 MG EC tablet Take 1 tablet by mouth daily 30 tablet 5    ibuprofen (ADVIL;MOTRIN) 800 MG tablet Take 1 tablet by mouth every 8 hours as needed for Pain 90 tablet 3     No current facility-administered medications for this visit. I will continue his current medication regimen  which is part of the above treatment schedule.  It has been helping with Mr. Henok Goldstein chronic  medical problems which for this visit include:   Diagnoses of Chronic pain syndrome, Lumbosacral spondylosis without myelopathy, Chronic bilateral low back pain with bilateral sciatica, Compression fracture of T12 vertebra, sequela, Cervicalgia, Back spasm, Chronic pain of left ankle, and Class 1 obesity due to excess calories without serious comorbidity with body mass index (BMI) of 32.0 to 32.9 in adult were pertinent to this visit. Risks and benefits of the medications and other alternative treatments  including no treatment were discussed with the patient. The common side effects of these medications were also explained to the patient. Informed verbal consent was obtained. Goals of current treatment regimen include improvement in pain, restoration of functioning- with focus on improvement in physical performance, general activity, work or disability,emotional distress, health care utilization and  decreased medication consumption. Will continue to monitor progress towards achieving/maintaining therapeutic goals with special emphasis on  1. Improvement in perceived interfernce  of pain with ADL's. Ability to do home exercises independently. Ability to do household chores indoor and/or outdoor work and social and leisure activities. Improve psychosocial and physical functioning. - he is showing progression towards this treatment goal with the current regimen. He was advised against drinking alcohol with the narcotic pain medicines, advised against driving or handling machinery while adjusting the dose of medicines or if having cognitive  issues related to the current medications. Risk of overdose and death, if medicines not taken as prescribed, were also discussed. If the patient develops new symptoms or if the symptoms worsen, the patient should call the office. While transcribing every attempt was made to maintain the accuracy of the note in terms of it's contents,there may have been some errors made inadvertently. Thank you for allowing me to participate in the care of this patient.     Brannon Guthrie CNP    Cc: Rocael Almaguer MD

## 2022-01-05 ENCOUNTER — VIRTUAL VISIT (OUTPATIENT)
Dept: PAIN MANAGEMENT | Age: 52
End: 2022-01-05
Payer: COMMERCIAL

## 2022-01-05 DIAGNOSIS — G89.29 CHRONIC BILATERAL LOW BACK PAIN WITH BILATERAL SCIATICA: ICD-10-CM

## 2022-01-05 DIAGNOSIS — M47.817 LUMBOSACRAL SPONDYLOSIS WITHOUT MYELOPATHY: ICD-10-CM

## 2022-01-05 DIAGNOSIS — M54.41 CHRONIC BILATERAL LOW BACK PAIN WITH BILATERAL SCIATICA: ICD-10-CM

## 2022-01-05 DIAGNOSIS — S22.080S COMPRESSION FRACTURE OF T12 VERTEBRA, SEQUELA: ICD-10-CM

## 2022-01-05 DIAGNOSIS — M25.572 CHRONIC PAIN OF LEFT ANKLE: ICD-10-CM

## 2022-01-05 DIAGNOSIS — M54.2 CERVICALGIA: ICD-10-CM

## 2022-01-05 DIAGNOSIS — M62.830 BACK SPASM: ICD-10-CM

## 2022-01-05 DIAGNOSIS — G89.4 CHRONIC PAIN SYNDROME: ICD-10-CM

## 2022-01-05 DIAGNOSIS — M54.42 CHRONIC BILATERAL LOW BACK PAIN WITH BILATERAL SCIATICA: ICD-10-CM

## 2022-01-05 DIAGNOSIS — G89.29 CHRONIC PAIN OF LEFT ANKLE: ICD-10-CM

## 2022-01-05 PROCEDURE — G8427 DOCREV CUR MEDS BY ELIG CLIN: HCPCS | Performed by: NURSE PRACTITIONER

## 2022-01-05 PROCEDURE — 3017F COLORECTAL CA SCREEN DOC REV: CPT | Performed by: NURSE PRACTITIONER

## 2022-01-05 PROCEDURE — 99213 OFFICE O/P EST LOW 20 MIN: CPT | Performed by: NURSE PRACTITIONER

## 2022-01-05 RX ORDER — HYDROCODONE BITARTRATE AND ACETAMINOPHEN 5; 325 MG/1; MG/1
1 TABLET ORAL EVERY 8 HOURS PRN
Qty: 90 TABLET | Refills: 0 | Status: SHIPPED | OUTPATIENT
Start: 2022-01-05 | End: 2022-02-02 | Stop reason: SDUPTHER

## 2022-01-05 RX ORDER — BACLOFEN 10 MG/1
10 TABLET ORAL 2 TIMES DAILY
Qty: 60 TABLET | Refills: 0 | Status: SHIPPED | OUTPATIENT
Start: 2022-01-05 | End: 2022-02-02 | Stop reason: SDUPTHER

## 2022-01-05 NOTE — PROGRESS NOTES
TELE HEALTH VISIT (AUDIO-VISUAL)    Pursuant to the emergency declaration under the [facility] waiver authority and the [employer] Act, this Virtual  Visit was conducted, with patient's consent, to reduce the patient's risk of exposure to COVID-19 and provide continuity of care for an established patient. Service is  provided through a video synchronous discussion virtually to substitute for in-person clinic visit. Due to this being a TeleHealth encounter (During YDWDS-31 public health emergency), evaluation of the following organ systems was limited: Vitals/Constitutional/EENT/Resp/CV/GI//MS/Neuro/Skin/Kict-Hjqf-Bag. Cheri Turner III  1970  [id number]    Mr. Lynda Kaba is being seen virtually for a follow up visit using Doxy. me/Audio Shack Video visit/[employer] or face time  Informed verbal consent to the virtual visit was obtained from Mr. Lynda Kaba. Risks associated with HIPPA compliance with the virtual visit was explained to the patient. Mr. Lynda Kaba is at his home and Albina NAVARRO is in her home office. HISTORY OF PRESENT ILLNESS:  Mr. Lynda Kaba is a 46 y.o. male  being assessed for a follow up visit for pain management for evaluation of ongoing care regarding his symptoms and monitoring of compliance with long term use high risk medications. He has a diagnosis of   1. Chronic pain syndrome    2. Lumbosacral spondylosis without myelopathy    3. Chronic bilateral low back pain with bilateral sciatica    4. Back spasm    5. Compression fracture of T12 vertebra, sequela    6. Cervicalgia    7. Chronic pain of left ankle      On the Patients Pain Assessment form reviewed with the Medical Assistant:  He complains of pain in the neck, lower back, both legs and both feet . He rates the pain 8/10 and describes it as aching, burning, numbness, pins and needles.  Current treatment regimen has helped relieve about 20% of the pain. He denies any side effects from the current pain regimen. Patient reports that since the last follow up visit the physical functioning is unchanged, family/social relationships are unchanged, mood is unchanged sleep patterns are unchanged, and that the overall functioning is unchanged. Patient denies misusing/abusing his narcotic pain medications or using any illegal drugs. Upon obtaining medical history from Ms. Joe Medina states that pain is manageable on current pain therapy. Takes pain medications as prescribed. Mood is stable without anxiety. Sleep is fair with an average of 5-6 hours. Denies to having issues of constipation. Tolerating activities/house chores with moderate tenderness to the lower back. ALLERGIES: Patients list of allergies were reviewed     MEDICATIONS: Mr. Joe Medina list of medications were reviewed. His current medications are   Outpatient Medications Prior to Visit   Medication Sig Dispense Refill    aspirin EC 81 MG EC tablet Take 1 tablet by mouth daily 30 tablet 5    ibuprofen (ADVIL;MOTRIN) 800 MG tablet Take 1 tablet by mouth every 8 hours as needed for Pain 90 tablet 3    Lidocaine 1.8 % PTCH Apply 1 patch topically daily 30 patch 0    baclofen (LIORESAL) 10 MG tablet Take 1 tablet by mouth 2 times daily 60 tablet 0    HYDROcodone-acetaminophen (NORCO) 5-325 MG per tablet Take 1 tablet by mouth every 8 hours as needed for Pain for up to 30 days. Intended supply: 3 days. Take lowest dose possible to manage pain 90 tablet 0     No facility-administered medications prior to visit. SOCIAL/FAMILY/PAST MEDICAL HISTORY: Mr. Heydi Michelle, family and past medical history was reviewed. REVIEW OF SYSTEMS:    Respiratory: Negative for apnea, chest tightness and shortness of breath or change in baseline breathing.     Gastrointestinal: Negative for nausea, vomiting, abdominal pain, diarrhea, constipation, blood in stool and abdominal distention. PHYSICAL EXAM:   Nursing note and vitals reviewed. There were no vitals taken for this visit. as per patient  Constitutional: He appears well-developed and well-nourished. No acute distress. Skin: Skin appears to be warm and dry. No rashes or any other marks noted. He is not diaphoretic. Neurological/Psychiatric:He is alert and oriented to person, place, and time. Coordination is  normal.  His mood isAppropriate and affect is Neutral/Euthymic(normal). His behavior is normal.   thought content normal.   Musculoskeletal / Extremities: Range of motion is normal. Gait is normal, assistive devices use: none. IMPRESSION:   1. Chronic pain syndrome    2. Lumbosacral spondylosis without myelopathy    3. Chronic bilateral low back pain with bilateral sciatica    4. Back spasm    5. Compression fracture of T12 vertebra, sequela    6. Cervicalgia    7. Chronic pain of left ankle        PLAN:  Informed verbal consent regarding treatment was obtained  -Continue with Norco, ZTlido, Baclofen  -Kristie exercises/back stretches recommended  -Last had CARLITO of the Lumbar spine with Dr. Manjit Liao on 11/10/21 with good pain relief  -CBT techniques- relaxation therapies such as biofeedback, mindfulness based stress reduction, imagery, cognitive restructuring, problem solving discussed with patient  -Advised patient to quit smoking for  health related concerns and to improve the treatment outcomes. Education was given on quitting smoking and the use of different modalities including medications, hypnotherapy, counselling  and biofeedback. These were discussed with patient.  -Last UDS 10/12/21 Consistent  -Return in about 4 weeks (around 2/2/2022).      Current Outpatient Medications   Medication Sig Dispense Refill    Lidocaine 1.8 % PTCH Apply 1 patch topically daily 30 patch 0    baclofen (LIORESAL) 10 MG tablet Take 1 tablet by mouth 2 times daily 60 tablet 0    HYDROcodone-acetaminophen (NORCO) 5-325 MG per tablet Take 1 tablet by mouth every 8 hours as needed for Pain for up to 30 days. Intended supply: 3 days. Take lowest dose possible to manage pain 90 tablet 0    aspirin EC 81 MG EC tablet Take 1 tablet by mouth daily 30 tablet 5    ibuprofen (ADVIL;MOTRIN) 800 MG tablet Take 1 tablet by mouth every 8 hours as needed for Pain 90 tablet 3     No current facility-administered medications for this visit. I will continue his current medication regimen  which is part of the above treatment schedule. It has been helping with Mr. Cortez Lowery chronic  medical problems which for this visit include:   Diagnoses of Chronic pain syndrome, Lumbosacral spondylosis without myelopathy, Chronic bilateral low back pain with bilateral sciatica, Back spasm, Compression fracture of T12 vertebra, sequela, Cervicalgia, and Chronic pain of left ankle were pertinent to this visit. Risks and benefits of the medications and other alternative treatments  including no treatment were discussed with the patient. The common side effects of these medications were also explained to the patient. Informed verbal consent was obtained. Goals of current treatment regimen include improvement in pain, restoration of functioning- with focus on improvement in physical performance, general activity, work or disability,emotional distress, health care utilization and  decreased medication consumption. Will continue to monitor progress towards achieving/maintaining therapeutic goals with special emphasis on  1. Improvement in perceived interfernce  of pain with ADL's. Ability to do home exercises independently. Ability to do household chores indoor and/or outdoor work and social and leisure activities. Improve psychosocial and physical functioning. - he is showing progression towards this treatment goal with the current regimen.     He was advised against drinking alcohol with the narcotic pain medicines, advised against driving or handling machinery while adjusting the dose of medicines or if having cognitive  issues related to the current medications. Risk of overdose and death, if medicines not taken as prescribed, were also discussed. If the patient develops new symptoms or if the symptoms worsen, the patient should call the office. While transcribing every attempt was made to maintain the accuracy of the note in terms of it's contents,there may have been some errors made inadvertently. Thank you for allowing me to participate in the care of this patient. Jose Barth.  Alex Hernandez APRN-CNP     Cc: Pilar Moraes MD

## 2022-01-05 NOTE — PATIENT INSTRUCTIONS
Patient Education        Back Stretches: Exercises  Introduction  Here are some examples of exercises for stretching your back. Start each exercise slowly. Ease off the exercise if you start to have pain. Your doctor or physical therapist will tell you when you can start these exercises and which ones will work best for you. How to do the exercises  Overhead stretch    1. Stand comfortably with your feet shoulder-width apart. 2. Looking straight ahead, raise both arms over your head and reach toward the ceiling. Do not allow your head to tilt back. 3. Hold for 15 to 30 seconds, then lower your arms to your sides. 4. Repeat 2 to 4 times. Side stretch    1. Stand comfortably with your feet shoulder-width apart. 2. Raise one arm over your head, and then lean to the other side. 3. Slide your hand down your leg as you let the weight of your arm gently stretch your side muscles. Hold for 15 to 30 seconds. 4. Repeat 2 to 4 times on each side. Press-up    1. Lie on your stomach, supporting your body with your forearms. 2. Press your elbows down into the floor to raise your upper back. As you do this, relax your stomach muscles and allow your back to arch without using your back muscles. As your press up, do not let your hips or pelvis come off the floor. 3. Hold for 15 to 30 seconds, then relax. 4. Repeat 2 to 4 times. Relax and rest    1. Lie on your back with a rolled towel under your neck and a pillow under your knees. Extend your arms comfortably to your sides. 2. Relax and breathe normally. 3. Remain in this position for about 10 minutes. 4. If you can, do this 2 or 3 times each day. Follow-up care is a key part of your treatment and safety. Be sure to make and go to all appointments, and call your doctor if you are having problems. It's also a good idea to know your test results and keep a list of the medicines you take. Where can you learn more? Go to https://satisheb.[employer]. org and sign in to your [employer] account. Enter Z219 in the [employer] box to learn more about \"Back Stretches: Exercises. \"     If you do not have an account, please click on the \"Sign Up Now\" link. Current as of: July 1, 2021               Content Version: 13.1  © 9857-5292 Healthwise, Incorporated. Care instructions adapted under license by [facility] ([facility]). If you have questions about a medical condition or this instruction, always ask your healthcare professional. [address] any warranty or liability for your use of this information.

## 2022-02-02 ENCOUNTER — VIRTUAL VISIT (OUTPATIENT)
Dept: PAIN MANAGEMENT | Age: 52
End: 2022-02-02
Payer: COMMERCIAL

## 2022-02-02 DIAGNOSIS — M62.830 BACK SPASM: ICD-10-CM

## 2022-02-02 DIAGNOSIS — M54.42 CHRONIC BILATERAL LOW BACK PAIN WITH BILATERAL SCIATICA: ICD-10-CM

## 2022-02-02 DIAGNOSIS — M54.2 CERVICALGIA: ICD-10-CM

## 2022-02-02 DIAGNOSIS — M25.572 CHRONIC PAIN OF LEFT ANKLE: ICD-10-CM

## 2022-02-02 DIAGNOSIS — M47.817 LUMBOSACRAL SPONDYLOSIS WITHOUT MYELOPATHY: ICD-10-CM

## 2022-02-02 DIAGNOSIS — M54.41 CHRONIC BILATERAL LOW BACK PAIN WITH BILATERAL SCIATICA: ICD-10-CM

## 2022-02-02 DIAGNOSIS — G89.29 CHRONIC PAIN OF LEFT ANKLE: ICD-10-CM

## 2022-02-02 DIAGNOSIS — G89.29 CHRONIC BILATERAL LOW BACK PAIN WITH BILATERAL SCIATICA: ICD-10-CM

## 2022-02-02 DIAGNOSIS — G89.4 CHRONIC PAIN SYNDROME: ICD-10-CM

## 2022-02-02 DIAGNOSIS — S22.080S COMPRESSION FRACTURE OF T12 VERTEBRA, SEQUELA: ICD-10-CM

## 2022-02-02 PROCEDURE — 99213 OFFICE O/P EST LOW 20 MIN: CPT | Performed by: NURSE PRACTITIONER

## 2022-02-02 PROCEDURE — G8427 DOCREV CUR MEDS BY ELIG CLIN: HCPCS | Performed by: NURSE PRACTITIONER

## 2022-02-02 PROCEDURE — 3017F COLORECTAL CA SCREEN DOC REV: CPT | Performed by: NURSE PRACTITIONER

## 2022-02-02 RX ORDER — BACLOFEN 10 MG/1
10 TABLET ORAL 2 TIMES DAILY
Qty: 60 TABLET | Refills: 0 | Status: SHIPPED | OUTPATIENT
Start: 2022-02-02 | End: 2022-03-02 | Stop reason: SDUPTHER

## 2022-02-02 RX ORDER — HYDROCODONE BITARTRATE AND ACETAMINOPHEN 5; 325 MG/1; MG/1
1 TABLET ORAL EVERY 8 HOURS PRN
Qty: 90 TABLET | Refills: 0 | Status: SHIPPED | OUTPATIENT
Start: 2022-02-02 | End: 2022-03-02 | Stop reason: SDUPTHER

## 2022-02-02 NOTE — PATIENT INSTRUCTIONS
Patient Education        Back Stretches: Exercises  Introduction  Here are some examples of exercises for stretching your back. Start each exercise slowly. Ease off the exercise if you start to have pain. Your doctor or physical therapist will tell you when you can start these exercises and which ones will work best for you. How to do the exercises  Overhead stretch    1. Stand comfortably with your feet shoulder-width apart. 2. Looking straight ahead, raise both arms over your head and reach toward the ceiling. Do not allow your head to tilt back. 3. Hold for 15 to 30 seconds, then lower your arms to your sides. 4. Repeat 2 to 4 times. Side stretch    1. Stand comfortably with your feet shoulder-width apart. 2. Raise one arm over your head, and then lean to the other side. 3. Slide your hand down your leg as you let the weight of your arm gently stretch your side muscles. Hold for 15 to 30 seconds. 4. Repeat 2 to 4 times on each side. Press-up    1. Lie on your stomach, supporting your body with your forearms. 2. Press your elbows down into the floor to raise your upper back. As you do this, relax your stomach muscles and allow your back to arch without using your back muscles. As your press up, do not let your hips or pelvis come off the floor. 3. Hold for 15 to 30 seconds, then relax. 4. Repeat 2 to 4 times. Relax and rest    1. Lie on your back with a rolled towel under your neck and a pillow under your knees. Extend your arms comfortably to your sides. 2. Relax and breathe normally. 3. Remain in this position for about 10 minutes. 4. If you can, do this 2 or 3 times each day. Follow-up care is a key part of your treatment and safety. Be sure to make and go to all appointments, and call your doctor if you are having problems. It's also a good idea to know your test results and keep a list of the medicines you take. Where can you learn more? Go to https://satisheb.health[email or web address] org and sign in to your [employer] account. Enter P122 in the [employer] box to learn more about \"Back Stretches: Exercises. \"     If you do not have an account, please click on the \"Sign Up Now\" link. Current as of: July 1, 2021               Content Version: 13.1  © 1515-5312 Healthwise, Incorporated. Care instructions adapted under license by [facility] [place] ([facility]). If you have questions about a medical condition or this instruction, always ask your healthcare professional. [address] any warranty or liability for your use of this information.

## 2022-02-02 NOTE — PROGRESS NOTES
TELE HEALTH VISIT (AUDIO-VISUAL)    Vlad Power III, was evaluated through a synchronous (real-time) audio-video encounter. The patient (or guardian if applicable) is aware that this is a billable service, which includes applicable co-pays. This Virtual Visit was conducted with patient's (and/or legal guardian's) consent. The visit was conducted pursuant to the emergency declaration under the [address] [place] [address] and the [employer] Act. Patient identification was verified, and a caregiver was present when appropriate. The patient was located in a state where the provider was licensed to provide care. Nishant Lucero III  1970  [id number]    Mr. Shahnaz De La Paz is being seen virtually for a follow up visit using [employer] Video visit  Informed verbal consent to the virtual visit was obtained from Mr. Shahnaz De La Paz. Risks associated with HIPPA compliance with the virtual visit was explained to the patient. Mr. Shahnaz De La Paz is at her home and Sky NAVARRO is in her home office. HISTORY OF PRESENT ILLNESS:  Mr. Shahnaz De La Paz is a 46 y.o. male  being assessed for a follow up visit for pain management for evaluation of ongoing care regarding his symptoms and monitoring of compliance with long term use high risk medications. He has a diagnosis of   1. Chronic pain syndrome    2. Lumbosacral spondylosis without myelopathy    3. Chronic bilateral low back pain with bilateral sciatica    4. Compression fracture of T12 vertebra, sequela    5. Back spasm    6. Cervicalgia    7. Chronic pain of left ankle      On the Patients Pain Assessment form reviewed with the Medical Assistant:  He complains of pain in the neck, back both legs and both feet . He rates the pain 9/10 and describes it as aching, burning, numbness, pins and needles. Current treatment regimen has helped relieve about 20% of the pain.   He denies any side effects from the current pain regimen. Patient reports that since the last follow up visit the physical functioning is unchanged, family/social relationships are unchanged, mood is unchanged sleep patterns are unchanged, and that the overall functioning is unchanged. Patient denies misusing/abusing his narcotic pain medications or using any illegal drugs. Upon obtaining medical history from Ms. Cece Carreon states that pain is manageable on current pain therapy. Takes pain medications as prescribed. Mood is stable without anxiety. Sleep is fair with an average of 5-6 hours. Denies to having issues of constipation. Tolerating activities/house chores with moderate tenderness to the lower back. Working on smoking cessation    ALLERGIES: Patients list of allergies were reviewed     MEDICATIONS: Mr. Cece Carreon list of medications were reviewed. His current medications are   Outpatient Medications Prior to Visit   Medication Sig Dispense Refill    aspirin EC 81 MG EC tablet Take 1 tablet by mouth daily 30 tablet 5    ibuprofen (ADVIL;MOTRIN) 800 MG tablet Take 1 tablet by mouth every 8 hours as needed for Pain 90 tablet 3    Lidocaine 1.8 % PTCH Apply 1 patch topically daily 30 patch 0    baclofen (LIORESAL) 10 MG tablet Take 1 tablet by mouth 2 times daily 60 tablet 0    HYDROcodone-acetaminophen (NORCO) 5-325 MG per tablet Take 1 tablet by mouth every 8 hours as needed for Pain for up to 30 days. Intended supply: 3 days. Take lowest dose possible to manage pain 90 tablet 0     No facility-administered medications prior to visit. SOCIAL/FAMILY/PAST MEDICAL HISTORY: Mr. Rocha Nip, family and past medical history was reviewed. REVIEW OF SYSTEMS:    Respiratory: Negative for apnea, chest tightness and shortness of breath or change in baseline breathing. Gastrointestinal: Negative for nausea, vomiting, abdominal pain, diarrhea, constipation, blood in stool and abdominal distention. PHYSICAL EXAM:   Nursing note and vitals reviewed. There were no vitals taken for this visit. as per patient  Constitutional: He appears well-developed and well-nourished. No acute distress. Skin: Skin appears to be warm and dry. No rashes or any other marks noted. He is not diaphoretic. Neurological/Psychiatric:He is alert and oriented to person, place, and time. Coordination is  normal.  His mood isAppropriate and affect is Neutral/Euthymic(normal). His behavior is normal.   thought content normal.   Musculoskeletal / Extremities: Gait is normal, assistive devices use: none. IMPRESSION:   1. Chronic pain syndrome    2. Lumbosacral spondylosis without myelopathy    3. Chronic bilateral low back pain with bilateral sciatica    4. Compression fracture of T12 vertebra, sequela    5. Back spasm    6. Cervicalgia    7. Chronic pain of left ankle        PLAN:  Informed verbal consent regarding treatment was obtained  -Continue with Norco, ZTlido, Baclofen  -Kristie exercises, back stretches recommended  -Maintains f/u with Dr. Crespo See for Lumbar injections  -CBT techniques- relaxation therapies such as biofeedback, mindfulness based stress reduction, imagery, cognitive restructuring, problem solving discussed with patient  -Advised patient to quit smoking for  health related concerns and to improve the treatment outcomes. Education was given on quitting smoking and the use of different modalities including medications, hypnotherapy, counselling  and biofeedback. These were discussed with patient.  -Last UDS 10/12/21 Consistent  -Return in about 4 weeks (around 3/2/2022). -[place] record was obtained and reviewed  for the last one year and no indicators of drug misuse  were found. Any other controlled substance prescriptions  seen on the record have been accounted for, I am aware of the patient receiving these medications. Mahesh Covarrubiash OARRS record will be rechecked as part of office protocol.      Current Outpatient Medications   Medication Sig Dispense Refill    Lidocaine 1.8 % PTCH Apply 1 patch topically daily 30 patch 0    baclofen (LIORESAL) 10 MG tablet Take 1 tablet by mouth 2 times daily 60 tablet 0    HYDROcodone-acetaminophen (NORCO) 5-325 MG per tablet Take 1 tablet by mouth every 8 hours as needed for Pain for up to 30 days. Intended supply: 3 days. Take lowest dose possible to manage pain 90 tablet 0    aspirin EC 81 MG EC tablet Take 1 tablet by mouth daily 30 tablet 5    ibuprofen (ADVIL;MOTRIN) 800 MG tablet Take 1 tablet by mouth every 8 hours as needed for Pain 90 tablet 3     No current facility-administered medications for this visit. I will continue his current medication regimen  which is part of the above treatment schedule. It has been helping with Mr. Mcgill Manus chronic  medical problems which for this visit include:   Diagnoses of Chronic pain syndrome, Lumbosacral spondylosis without myelopathy, Chronic bilateral low back pain with bilateral sciatica, Compression fracture of T12 vertebra, sequela, Back spasm, Cervicalgia, and Chronic pain of left ankle were pertinent to this visit. Risks and benefits of the medications and other alternative treatments  including no treatment were discussed with the patient. The common side effects of these medications were also explained to the patient. Informed verbal consent was obtained. Goals of current treatment regimen include improvement in pain, restoration of functioning- with focus on improvement in physical performance, general activity, work or disability,emotional distress, health care utilization and  decreased medication consumption. Will continue to monitor progress towards achieving/maintaining therapeutic goals with special emphasis on  1. Improvement in perceived interfernce  of pain with ADL's. Ability to do home exercises independently.  Ability to do household chores indoor and/or outdoor work and social and leisure activities. Improve psychosocial and physical functioning. - he is showing progression towards this treatment goal with the current regimen. He was advised against drinking alcohol with the narcotic pain medicines, advised against driving or handling machinery while adjusting the dose of medicines or if having cognitive  issues related to the current medications. Risk of overdose and death, if medicines not taken as prescribed, were also discussed. If the patient develops new symptoms or if the symptoms worsen, the patient should call the office. While transcribing every attempt was made to maintain the accuracy of the note in terms of it's contents,there may have been some errors made inadvertently. Thank you for allowing me to participate in the care of this patient. Milena Dejesus.  [facility] APRN-CNP     Cc: Cecilia Downs MD

## 2022-03-02 ENCOUNTER — OFFICE VISIT (OUTPATIENT)
Dept: PAIN MANAGEMENT | Age: 52
End: 2022-03-02
Payer: COMMERCIAL

## 2022-03-02 VITALS
HEART RATE: 90 BPM | HEIGHT: 71 IN | SYSTOLIC BLOOD PRESSURE: 137 MMHG | BODY MASS INDEX: 31.64 KG/M2 | DIASTOLIC BLOOD PRESSURE: 96 MMHG | OXYGEN SATURATION: 96 % | WEIGHT: 226 LBS | TEMPERATURE: 97.7 F

## 2022-03-02 DIAGNOSIS — M54.41 CHRONIC BILATERAL LOW BACK PAIN WITH BILATERAL SCIATICA: ICD-10-CM

## 2022-03-02 DIAGNOSIS — M25.572 CHRONIC PAIN OF LEFT ANKLE: ICD-10-CM

## 2022-03-02 DIAGNOSIS — E66.09 CLASS 1 OBESITY DUE TO EXCESS CALORIES WITHOUT SERIOUS COMORBIDITY WITH BODY MASS INDEX (BMI) OF 31.0 TO 31.9 IN ADULT: ICD-10-CM

## 2022-03-02 DIAGNOSIS — M47.817 LUMBOSACRAL SPONDYLOSIS WITHOUT MYELOPATHY: ICD-10-CM

## 2022-03-02 DIAGNOSIS — G89.4 CHRONIC PAIN SYNDROME: ICD-10-CM

## 2022-03-02 DIAGNOSIS — G89.29 CHRONIC PAIN OF LEFT ANKLE: ICD-10-CM

## 2022-03-02 DIAGNOSIS — M54.42 CHRONIC BILATERAL LOW BACK PAIN WITH BILATERAL SCIATICA: ICD-10-CM

## 2022-03-02 DIAGNOSIS — M54.2 CERVICALGIA: ICD-10-CM

## 2022-03-02 DIAGNOSIS — G89.29 CHRONIC BILATERAL LOW BACK PAIN WITH BILATERAL SCIATICA: ICD-10-CM

## 2022-03-02 DIAGNOSIS — S22.080S COMPRESSION FRACTURE OF T12 VERTEBRA, SEQUELA: ICD-10-CM

## 2022-03-02 DIAGNOSIS — M62.830 BACK SPASM: ICD-10-CM

## 2022-03-02 PROCEDURE — G8427 DOCREV CUR MEDS BY ELIG CLIN: HCPCS | Performed by: NURSE PRACTITIONER

## 2022-03-02 PROCEDURE — G8417 CALC BMI ABV UP PARAM F/U: HCPCS | Performed by: NURSE PRACTITIONER

## 2022-03-02 PROCEDURE — 4004F PT TOBACCO SCREEN RCVD TLK: CPT | Performed by: NURSE PRACTITIONER

## 2022-03-02 PROCEDURE — 99213 OFFICE O/P EST LOW 20 MIN: CPT | Performed by: NURSE PRACTITIONER

## 2022-03-02 PROCEDURE — G8484 FLU IMMUNIZE NO ADMIN: HCPCS | Performed by: NURSE PRACTITIONER

## 2022-03-02 PROCEDURE — 3017F COLORECTAL CA SCREEN DOC REV: CPT | Performed by: NURSE PRACTITIONER

## 2022-03-02 RX ORDER — BACLOFEN 10 MG/1
10 TABLET ORAL 2 TIMES DAILY
Qty: 60 TABLET | Refills: 0 | Status: SHIPPED | OUTPATIENT
Start: 2022-03-02 | End: 2022-03-30 | Stop reason: SDUPTHER

## 2022-03-02 RX ORDER — HYDROCODONE BITARTRATE AND ACETAMINOPHEN 5; 325 MG/1; MG/1
1 TABLET ORAL EVERY 8 HOURS PRN
Qty: 90 TABLET | Refills: 0 | Status: SHIPPED | OUTPATIENT
Start: 2022-03-02 | End: 2022-03-30 | Stop reason: SDUPTHER

## 2022-03-02 NOTE — PATIENT INSTRUCTIONS
Patient Education        Back Stretches: Exercises  Introduction  Here are some examples of exercises for stretching your back. Start each exercise slowly. Ease off the exercise if you start to have pain. Your doctor or physical therapist will tell you when you can start these exercises and which ones will work best for you. How to do the exercises  Overhead stretch    1. Stand comfortably with your feet shoulder-width apart. 2. Looking straight ahead, raise both arms over your head and reach toward the ceiling. Do not allow your head to tilt back. 3. Hold for 15 to 30 seconds, then lower your arms to your sides. 4. Repeat 2 to 4 times. Side stretch    1. Stand comfortably with your feet shoulder-width apart. 2. Raise one arm over your head, and then lean to the other side. 3. Slide your hand down your leg as you let the weight of your arm gently stretch your side muscles. Hold for 15 to 30 seconds. 4. Repeat 2 to 4 times on each side. Press-up    1. Lie on your stomach, supporting your body with your forearms. 2. Press your elbows down into the floor to raise your upper back. As you do this, relax your stomach muscles and allow your back to arch without using your back muscles. As your press up, do not let your hips or pelvis come off the floor. 3. Hold for 15 to 30 seconds, then relax. 4. Repeat 2 to 4 times. Relax and rest    1. Lie on your back with a rolled towel under your neck and a pillow under your knees. Extend your arms comfortably to your sides. 2. Relax and breathe normally. 3. Remain in this position for about 10 minutes. 4. If you can, do this 2 or 3 times each day. Follow-up care is a key part of your treatment and safety. Be sure to make and go to all appointments, and call your doctor if you are having problems. It's also a good idea to know your test results and keep a list of the medicines you take. Where can you learn more? Go to [email or web address] and sign in to your [employer] account. Enter R307 in the [employer] box to learn more about \"Back Stretches: Exercises. \"     If you do not have an account, please click on the \"Sign Up Now\" link. Current as of: July 1, 2021               Content Version: 13.1  © 2972-7729 Healthwise, Incorporated. Care instructions adapted under license by [facility] ([facility]). If you have questions about a medical condition or this instruction, always ask your healthcare professional. [address] any warranty or liability for your use of this information.

## 2022-03-02 NOTE — PROGRESS NOTES
Cathie Patient III  1970  [id number]      HISTORY OF PRESENT ILLNESS: Mr. Flory Seaman is a 46 y.o. male returns for a follow up visit for pain management  He has a diagnosis of   1. Chronic pain syndrome    2. Lumbosacral spondylosis without myelopathy    3. Compression fracture of T12 vertebra, sequela    4. Chronic bilateral low back pain with bilateral sciatica    5. Back spasm    6. Cervicalgia    7. Chronic pain of left ankle    8. Class 1 obesity due to excess calories without serious comorbidity with body mass index (BMI) of 31.0 to 31.9 in adult    . As per Information Obtained from the PADT (Patient Assessment and Documentation Tool)    He complains of pain in the neck, down the spine, both legs He rates the pain 9/10 and describes it as aching. Current treatment regimen has helped relieve about 20% of the pain. He denies any side effects from the current pain regimen. Patient reports that since the last follow up visit the physical functioning is unchanged, family/social relationships are unchanged, mood is unchanged sleep patterns are unchanged, and that the overall functioning is unchanged. Patient denies misusing/abusing his narcotic pain medications or using any illegal drugs. Upon obtaining medical history from Mr. Flory Seaman states that pain is manageable on current pain therapy. Takes pain medications as prescribed. Last had CARLITO of the Lumbar spine with Dr. Tania Wilkinson. Mood is stable without anxiety. Sleep is fair with an average of 5-6 hours. Denies to having issues of constipation. Tolerating activities/house chores with moderate tenderness to the lower back. Working on smoking cessation    ALLERGIES: Patients list of allergies were reviewed     MEDICATIONS: Mr. Flory Seaman list of medications were reviewed. His current medications are   Outpatient Medications Prior to Visit   Medication Sig Dispense Refill    aspirin EC 81 MG EC tablet Take 1 tablet by mouth daily 30 tablet 5    ibuprofen (ADVIL;MOTRIN) 800 MG tablet Take 1 tablet by mouth every 8 hours as needed for Pain 90 tablet 3    Lidocaine 1.8 % PTCH Apply 1 patch topically daily 30 patch 0    baclofen (LIORESAL) 10 MG tablet Take 1 tablet by mouth 2 times daily 60 tablet 0    HYDROcodone-acetaminophen (NORCO) 5-325 MG per tablet Take 1 tablet by mouth every 8 hours as needed for Pain for up to 30 days. Intended supply: 3 days. Take lowest dose possible to manage pain 90 tablet 0     No facility-administered medications prior to visit. SOCIAL/FAMILY/PAST MEDICAL HISTORY: Mr. Mary Lind, family and past medical history was reviewed. REVIEW OF SYSTEMS:    Respiratory: Negative for apnea, chest tightness and shortness of breath or change in baseline breathing. Gastrointestinal: Negative for nausea, vomiting, abdominal pain, diarrhea, constipation, blood in stool and abdominal distention. PHYSICAL EXAM:   Nursing note and vitals reviewed. BP (!) 137/96   Pulse 90   Temp 97.7 °F (36.5 °C)   Ht 5' 11\" (1.803 m)   Wt 226 lb (102.5 kg)   SpO2 96%   BMI 31.52 kg/m²   Constitutional: He appears well-developed and well-nourished. No acute distress. Skin: Skin is warm and dry, good turgor. No rash noted. He is not diaphoretic. Cardiovascular: Normal rate, regular rhythm, normal heart sounds, and does not have murmur. Pulmonary/Chest: Effort normal. No respiratory distress. He does not have wheezes in the lung fields. He has no rales. Neurological/Psychiatric:He is alert and oriented to person, place, and time. Coordination is  normal.  His mood isAppropriate and affect is Neutral/Euthymic(normal) .      Prescription pain medication monitoring:                  MEDD current = 15              ORT Score = 1 low risk               Other Risk factors  (mood) Stable              Date of Last Medication Agreement: 3/2/22              Date Naloxone prescribed: 3/2/22              UDT: Date of last UDT: 12/12/21                          Adverse report: No;               OARRS:                          Checked today: Yes                          Adverse report: No    IMPRESSION:   1. Chronic pain syndrome    2. Lumbosacral spondylosis without myelopathy    3. Compression fracture of T12 vertebra, sequela    4. Chronic bilateral low back pain with bilateral sciatica    5. Back spasm    6. Cervicalgia    7. Chronic pain of left ankle    8. Class 1 obesity due to excess calories without serious comorbidity with body mass index (BMI) of 31.0 to 31.9 in adult        PLAN:  Informed verbal consent was obtained:  -No changes will be made to patients therapy today  -Kristie exercises, back stretches recommended  -CBT techniques- relaxation therapies such as biofeedback, mindfulness based stress reduction, imagery, cognitive restructuring, problem solving discussed with patient  -Advised patient to quit smoking for  health related concerns and to improve the treatment outcomes. Education was given on quitting smoking and the use of different modalities including medications, hypnotherapy, counselling  and biofeedback. These were discussed with patient.  -Last UDS 12/12/21 Consistent  -Return in about 4 weeks (around 3/30/2022). Analgesic Plan:              Continue present regimen: Norco 5-325 mg tabs tid prn              Adjust dose of present analgesic: No              Switch analgesics: No              Add/Adjust concomitant therapy: Ztlido, Baclofen    Current Outpatient Medications   Medication Sig Dispense Refill    naloxone (NARCAN) 4 MG/0.1ML LIQD nasal spray Use as directed 1 each 0    Lidocaine 1.8 % PTCH Apply 1 patch topically daily 30 patch 0    baclofen (LIORESAL) 10 MG tablet Take 1 tablet by mouth 2 times daily 60 tablet 0    HYDROcodone-acetaminophen (NORCO) 5-325 MG per tablet Take 1 tablet by mouth every 8 hours as needed for Pain for up to 30 days. Intended supply: 3 days.  Take lowest dose possible to manage pain 90 tablet 0    aspirin EC 81 MG EC tablet Take 1 tablet by mouth daily 30 tablet 5    ibuprofen (ADVIL;MOTRIN) 800 MG tablet Take 1 tablet by mouth every 8 hours as needed for Pain 90 tablet 3     No current facility-administered medications for this visit. I will continue his current medication regimen  which is part of the above treatment schedule. It has been helping with Mr. Janett Aburto chronic  medical problems which for this visit include:   Diagnoses of Chronic pain syndrome, Lumbosacral spondylosis without myelopathy, Compression fracture of T12 vertebra, sequela, Chronic bilateral low back pain with bilateral sciatica, Back spasm, Cervicalgia, Chronic pain of left ankle, and Class 1 obesity due to excess calories without serious comorbidity with body mass index (BMI) of 31.0 to 31.9 in adult were pertinent to this visit. Risks and benefits of the medications and other alternative treatments  including no treatment were discussed with the patient. The common side effects of these medications were also explained to the patient. Informed verbal consent was obtained. Goals of current treatment regimen include improvement in pain, restoration of functioning- with focus on improvement in physical performance, general activity, work or disability,emotional distress, health care utilization and  decreased medication consumption. Will continue to monitor progress towards achieving/maintaining therapeutic goals with special emphasis on  1. Improvement in perceived interfernce  of pain with ADL's. Ability to do home exercises independently. Ability to do household chores indoor and/or outdoor work and social and leisure activities. Improve psychosocial and physical functioning. - he is showing progression towards this treatment goal with the current regimen.     He was advised against drinking alcohol with the narcotic pain medicines, advised against driving or handling machinery while adjusting the dose of medicines or if having cognitive  issues related to the current medications. Risk of overdose and death, if medicines not taken as prescribed, were also discussed. If the patient develops new symptoms or if the symptoms worsen, the patient should call the office. While transcribing every attempt was made to maintain the accuracy of the note in terms of it's contents,there may have been some errors made inadvertently. Thank you for allowing me to participate in the care of this patient. Simran Meneses CNP.     Cc: Jack Rider MD

## 2022-03-04 RX ORDER — NALOXONE HYDROCHLORIDE 4 MG/.1ML
SPRAY NASAL
Qty: 1 EACH | Refills: 0 | Status: SHIPPED | OUTPATIENT
Start: 2022-03-04

## 2022-03-30 ENCOUNTER — OFFICE VISIT (OUTPATIENT)
Dept: PAIN MANAGEMENT | Age: 52
End: 2022-03-30
Payer: COMMERCIAL

## 2022-03-30 VITALS
OXYGEN SATURATION: 96 % | SYSTOLIC BLOOD PRESSURE: 129 MMHG | WEIGHT: 226 LBS | TEMPERATURE: 97.3 F | DIASTOLIC BLOOD PRESSURE: 85 MMHG | BODY MASS INDEX: 31.64 KG/M2 | HEART RATE: 88 BPM | HEIGHT: 71 IN

## 2022-03-30 DIAGNOSIS — M47.817 LUMBOSACRAL SPONDYLOSIS WITHOUT MYELOPATHY: ICD-10-CM

## 2022-03-30 DIAGNOSIS — M54.2 CERVICALGIA: ICD-10-CM

## 2022-03-30 DIAGNOSIS — G89.4 CHRONIC PAIN SYNDROME: ICD-10-CM

## 2022-03-30 DIAGNOSIS — G89.29 CHRONIC PAIN OF LEFT ANKLE: ICD-10-CM

## 2022-03-30 DIAGNOSIS — G89.29 CHRONIC BILATERAL LOW BACK PAIN WITH BILATERAL SCIATICA: ICD-10-CM

## 2022-03-30 DIAGNOSIS — M25.572 CHRONIC PAIN OF LEFT ANKLE: ICD-10-CM

## 2022-03-30 DIAGNOSIS — M54.42 CHRONIC BILATERAL LOW BACK PAIN WITH BILATERAL SCIATICA: ICD-10-CM

## 2022-03-30 DIAGNOSIS — S22.080S COMPRESSION FRACTURE OF T12 VERTEBRA, SEQUELA: ICD-10-CM

## 2022-03-30 DIAGNOSIS — M62.830 BACK SPASM: ICD-10-CM

## 2022-03-30 DIAGNOSIS — M54.41 CHRONIC BILATERAL LOW BACK PAIN WITH BILATERAL SCIATICA: ICD-10-CM

## 2022-03-30 PROCEDURE — G8417 CALC BMI ABV UP PARAM F/U: HCPCS | Performed by: NURSE PRACTITIONER

## 2022-03-30 PROCEDURE — G8484 FLU IMMUNIZE NO ADMIN: HCPCS | Performed by: NURSE PRACTITIONER

## 2022-03-30 PROCEDURE — 99213 OFFICE O/P EST LOW 20 MIN: CPT | Performed by: NURSE PRACTITIONER

## 2022-03-30 PROCEDURE — 4004F PT TOBACCO SCREEN RCVD TLK: CPT | Performed by: NURSE PRACTITIONER

## 2022-03-30 PROCEDURE — 3017F COLORECTAL CA SCREEN DOC REV: CPT | Performed by: NURSE PRACTITIONER

## 2022-03-30 PROCEDURE — G8427 DOCREV CUR MEDS BY ELIG CLIN: HCPCS | Performed by: NURSE PRACTITIONER

## 2022-03-30 RX ORDER — HYDROCODONE BITARTRATE AND ACETAMINOPHEN 5; 325 MG/1; MG/1
1 TABLET ORAL EVERY 8 HOURS PRN
Qty: 90 TABLET | Refills: 0 | Status: SHIPPED | OUTPATIENT
Start: 2022-03-30 | End: 2022-04-27 | Stop reason: SDUPTHER

## 2022-03-30 RX ORDER — BACLOFEN 10 MG/1
10 TABLET ORAL 2 TIMES DAILY
Qty: 60 TABLET | Refills: 0 | Status: SHIPPED | OUTPATIENT
Start: 2022-03-30 | End: 2022-04-27

## 2022-03-30 NOTE — PATIENT INSTRUCTIONS
Patient Education        Back Stretches: Exercises  Introduction  Here are some examples of exercises for stretching your back. Start eachexercise slowly. Ease off the exercise if you start to have pain. Your doctor or physical therapist will tell you when you can start theseexercises and which ones will work best for you. How to do the exercises  Overhead stretch    1. Stand comfortably with your feet shoulder-width apart. 2. Looking straight ahead, raise both arms over your head and reach toward the ceiling. Do not allow your head to tilt back. 3. Hold for 15 to 30 seconds, then lower your arms to your sides. 4. Repeat 2 to 4 times. Side stretch    1. Stand comfortably with your feet shoulder-width apart. 2. Raise one arm over your head, and then lean to the other side. 3. Slide your hand down your leg as you let the weight of your arm gently stretch your side muscles. Hold for 15 to 30 seconds. 4. Repeat 2 to 4 times on each side. Press-up    1. Lie on your stomach, supporting your body with your forearms. 2. Press your elbows down into the floor to raise your upper back. As you do this, relax your stomach muscles and allow your back to arch without using your back muscles. As your press up, do not let your hips or pelvis come off the floor. 3. Hold for 15 to 30 seconds, then relax. 4. Repeat 2 to 4 times. Relax and rest    1. Lie on your back with a rolled towel under your neck and a pillow under your knees. Extend your arms comfortably to your sides. 2. Relax and breathe normally. 3. Remain in this position for about 10 minutes. 4. If you can, do this 2 or 3 times each day. Follow-up care is a key part of your treatment and safety. Be sure to make and go to all appointments, and call your doctor if you are having problems. It's also a good idea to know your test results and keep alist of the medicines you take. Where can you learn more? Go to [email or web address].health[employer] [email or web address] org and sign in to your [employer] account. Enter B553 in the [facility] box to learn more about \"Back Stretches: Exercises. \"     If you do not have an account, please click on the \"Sign Up Now\" link. Current as of: 2021               Content Version: 13.2   [facility]. Care instructions adapted under license by [facility] ([facility]). If you have questions about a medical condition or this instruction, always ask your healthcare professional. [address] any warranty or liability for your use of this information. Patient Education        oxycodone  Pronunciation: ox i KOE done  Brand: Oxaydo, OxyCONTIN, Oxyfast, OxyIR, Roxicodone, Xtampza ER  What is the most important information I should know about oxycodone? MISUSE OF OPIOID MEDICINE CAN CAUSE ADDICTION, OVERDOSE, OR DEATH. Keep the medication in a place where others cannot get to it. Taking opioid medicine during pregnancy may cause life-threatening withdrawal symptoms in the . Fatal side effects can occur if you use opioid medicine with alcohol, or with other drugs that cause drowsiness or slow your breathing. What is oxycodone? Oxycodone is an opioid pain medication used to treat moderate to severe pain. The extended-release form of oxycodone is for around-the-clock treatment of pain and should not be used on an as-needed basis for pain. Oxycodone may also be used for purposes not listed in this medication guide. What should I discuss with my healthcare provider before using oxycodone? You should not use oxycodone if you are allergic to it, or if you have:   severe asthma or breathing problems; or   a blockage in your stomach or intestines. You should not use oxycodone unless you are already using a similar opioid medicine and are tolerant to it. Most brands of oxycodone are not approved for use in people under 18.  OxyContin should not be given to a child younger than 11 years old.  Tell your doctor if you have ever had:   breathing problems, sleep apnea;   a head injury, or seizures;   drug or alcohol addiction, or mental illness;   liver or kidney disease;   urination problems; or   problems with your gallbladder, pancreas, or thyroid. If you use opioid medicine while you are pregnant, your baby could become dependent on the drug. This can cause life-threatening withdrawal symptoms in the baby after it is born. Babies born dependent on opioids may need medical treatment for severalweeks. Ask a doctor before using opioid medicine if you are breastfeeding. Tell your doctor if you notice severe drowsiness or slow breathing in thenursing baby. How should I use oxycodone? Follow the directions on your prescription label and read all medication guides. Never use oxycodone in larger amounts, or for longer than prescribed. Tell your doctor if you feel an increased urge to take more of this medicine. Never share opioid medicine with another person, especially someone with a history of drug abuse or addiction. MISUSE CAN CAUSE ADDICTION, OVERDOSE, OR DEATH. Keep the medication in a place where others cannot get to it. Selling orgiving away opioid medicine is against the law. Stop taking all other around-the-clock opioid pain medicines when you start taking extended-release oxycodone. Take oxycodone with food. Swallow the capsule or tablet whole to avoid exposure to a potentially fatal overdose. Do not crush, chew, break,open, or dissolve. If you cannot swallow a capsule whole, open it and sprinkle the medicine into a spoonful of pudding or applesauce. Swallow the mixture right away without chewing. Do not save it for later use. Never crush or break an oxycodone pill to inhale the powder or mix it into a liquid to inject the drug into your vein. This can cause in death. Measure liquid medicine carefully.  Use the dosing syringe provided, or use a medicine dose-measuringdevice (not a kitchen spoon). You should not stop using oxycodone suddenly. Follow your doctor's instructions about tapering your dose. Store at room temperature, away from heat, moisture, and light. Keep track of your medicine. Oxycodone is a drug of abuse and you should be aware if anyoneis using your medicine improperly or without a prescription. Do not keep leftover opioid medication. Just one dose can cause death in someone using this medicine accidentally or improperly. Ask your pharmacist where to locate a drug take-back disposal program. Sarbjit Angulo is no take-back program, flush the unused medicine down the toilet. What happens if I miss a dose? Since oxycodone is used for pain, you are not likely to miss a dose. Skip any missed dose if it is almost time for your next dose. Do not use two doses at one time. What happens if I overdose? Seek emergency medical attention or call the Poison Help line at 1-665.826.7131. An opioid overdose can be fatal, especially in a child or other person using the medicine without a prescription. Overdose symptoms may include severe drowsiness, pinpoint pupils, slowbreathing, or no breathing. Your doctor may recommend you get naloxone (a medicine to reverse an opioid overdose) and keep it with you at all times. A person caring for you can give the naloxone if you stop breathing or don't wake up. Your caregiver must still get emergency medical help and may need to perform CPR (cardiopulmonaryresuscitation) on you while waiting for help to arrive. Anyone can buy naloxone from a pharmacy or local health department. Make sureany person caring for you knows where you keep naloxone and how to use it. What should I avoid while using oxycodone? Do not drink alcohol. Dangerous side effects or death could occur. Avoid driving or operating machinery until you know how oxycodone will affectyou. Dizziness or severe drowsiness can cause falls or other accidents. Avoid medication errors.  Always check the brand and strength of oxycodone you get from the pharmacy. What are the possible side effects of oxycodone? Get emergency medical help if you have signs of an allergic reaction: hives; difficult breathing; swelling of your face, lips, tongue, or throat. Opioid medicine can slow or stop your breathing, and death may occur. A person caring for you should give naloxone and/or seek emergency medical attention if you have slow breathing with long pauses, blue colored lips, or ifyou are hard to wake up. Call your doctor at once if you have:   noisy breathing, sighing, shallow breathing, breathing that stops during sleep;   a slow heart rate or weak pulse;   a light-headed feeling, like you might pass out;   confusion, unusual thoughts or behavior;   seizure (convulsions);   low cortisol levels -- nausea, vomiting, loss of appetite, dizziness, worsening tiredness or weakness; or   high levels of serotonin in the body --agitation, hallucinations, fever, sweating, shivering, fast heart rate, muscle stiffness, twitching, loss of coordination, nausea, vomiting, diarrhea. Serious breathing problems may be more likely in older adults and in those who are debilitated or have wasting syndrome or chronic breathing disorders. Common side effects may include:   drowsiness, headache, dizziness, tiredness; or   constipation, stomach pain, nausea, vomiting. This is not a complete list of side effects and others may occur. Call your doctor for medical advice about side effects. You may report side effects toFDA at 0-389-VRG-3441. What other drugs will affect oxycodone? You may have breathing problems or withdrawal symptoms if you start or stop taking certain other medicines. Tell your doctor if you also use an antibiotic, antifungal medication, heart or blood pressure medication, seizure medication, or medicine to treat HIV orhepatitis C.   Opioid medication can interact with many other drugs and cause dangerous side effects or death. Be sure your doctor knows if you also use:   cold or allergy medicines, bronchodilator asthma/COPD medication, or a diuretic (\"water pill\");   medicines for motion sickness, irritable bowel syndrome, or overactive bladder;   other opioids --opioid pain medicine or prescription cough medicine;   a sedative like Valium --diazepam, alprazolam, lorazepam, Xanax, Klonopin, Versed, and others;   drugs that make you sleepy or slow your breathing --a sleeping pill, muscle relaxer, medicine to treat mood disorders or mental illness; or   drugs that affect serotonin levels in your body --a stimulant, or medicine for depression, Parkinson's disease, migraine headaches, serious infections, or nausea and vomiting. This list is not complete and many other drugs may affect oxycodone. This includes prescription and over-the-counter medicines, vitamins, andherbal products. Not all possible drug interactions are listed here. Where can I get more information? Your pharmacist can provide more information about oxycodone. Remember, keep this and all other medicines out of the reach of children, never share your medicines with others, and use this medication only for the indication prescribed. Every effort has been made to ensure that the information provided by [facility] [address] [address] is accurate, up-to-date, and complete, but no guarantee is made to that effect. Drug information contained herein may be time sensitive. [facility] information has been compiled for use by healthcare practitioners and consumers in the [place] and therefore [facility] does not warrant that uses outside of the [place] are appropriate, unless specifically indicated otherwise. [facility]'s drug information does not endorse drugs, diagnose patients or recommend therapy.  [facility]'s drug information is an informational resource designed to assist licensed healthcare practitioners in caring for their patients and/or to serve consumers viewing this service as a supplement to, and not a substitute for, the expertise, skill, knowledge and judgment of healthcare practitioners. The absence of a warning for a given drug or drug combination in no way should be construed to indicate that the drug or drug combination is safe, effective or appropriate for any given patient. [facility] does not assume any responsibility for any aspect of healthcare administered with the aid of information [facility] provides. The information contained herein is not intended to cover all possible uses, directions, precautions, warnings, drug interactions, allergic reactions, or adverse effects. If you have questions about the drugs you are taking, check with yourdoctor, nurse or pharmacist.  Copyright [id number] [address]. Version: 14.02. Revision date:1/28/2021. Care instructions adapted under license by [facility] ([facility]). If you have questions about a medical condition or this instruction, always ask your healthcare professional. [place] [address] any warranty or liability for your use of this information.

## 2022-03-30 NOTE — PROGRESS NOTES
Ignacio Rangel III  1970  [id number]      HISTORY OF PRESENT ILLNESS: Mr. Guanaco Trotter is a 46 y.o. male returns for a follow up visit for pain management  He has a diagnosis of   1. Chronic pain syndrome    2. Lumbosacral spondylosis without myelopathy    3. Chronic bilateral low back pain with bilateral sciatica    4. Compression fracture of T12 vertebra, sequela    5. Back spasm    6. Cervicalgia    7. Chronic pain of left ankle    . As per Information Obtained from the PADT (Patient Assessment and Documentation Tool)    He complains of pain in the neck, mid back, lower back, both legs, both feet He rates the pain 8/10 and describes it as aching. Current treatment regimen has helped relieve about 20% of the pain. He denies any side effects from the current pain regimen. Patient reports that since the last follow up visit the physical functioning is unchanged, family/social relationships are unchanged, mood is unchanged sleep patterns are unchanged, and that the overall functioning is unchanged. Patient denies misusing/abusing his narcotic pain medications or using any illegal drugs. Upon obtaining medical history from Mr. Guanaco Trotter states that pain is not manageable on current pain therapy. Reports that the current pain therapy does not last long enough to manage the pain. Requesting for extended release medication, preferably MS Contin. Last received CARLITO of the Lumbar spine from Dr. Katina Mcghee on 11/20/21. Mood is stable without anxiety. Sleep is fair with an average of 5-6 hours. Denies to having issues of constipation. Tolerating activities/house chores with moderate tenderness to the lower back. ALLERGIES: Patients list of allergies were reviewed     MEDICATIONS: Mr. Guanaco Trotter list of medications were reviewed. His current medications are   Outpatient Medications Prior to Visit   Medication Sig Dispense Refill    naloxone (NARCAN) 4 MG/0.1ML LIQD nasal spray Use as directed 1 each 0    aspirin EC 81 MG EC tablet Take 1 tablet by mouth daily 30 tablet 5    ibuprofen (ADVIL;MOTRIN) 800 MG tablet Take 1 tablet by mouth every 8 hours as needed for Pain 90 tablet 3    Lidocaine 1.8 % PTCH Apply 1 patch topically daily 30 patch 0    baclofen (LIORESAL) 10 MG tablet Take 1 tablet by mouth 2 times daily 60 tablet 0    HYDROcodone-acetaminophen (NORCO) 5-325 MG per tablet Take 1 tablet by mouth every 8 hours as needed for Pain for up to 30 days. Intended supply: 3 days. Take lowest dose possible to manage pain 90 tablet 0     No facility-administered medications prior to visit. SOCIAL/FAMILY/PAST MEDICAL HISTORY: Mr. Chamberlain Larry, family and past medical history was reviewed. REVIEW OF SYSTEMS:    Respiratory: Negative for apnea, chest tightness and shortness of breath or change in baseline breathing. Gastrointestinal: Negative for nausea, vomiting, abdominal pain, diarrhea, constipation, blood in stool and abdominal distention. PHYSICAL EXAM:   Nursing note and vitals reviewed. /85   Pulse 88   Temp 97.3 °F (36.3 °C)   Ht 5' 11\" (1.803 m)   Wt 226 lb (102.5 kg)   SpO2 96%   BMI 31.52 kg/m²   Constitutional: He appears well-developed and well-nourished. No acute distress. Skin: Skin is warm and dry, good turgor. No rash noted. He is not diaphoretic. Cardiovascular: Normal rate, regular rhythm, normal heart sounds, and does not have murmur. Pulmonary/Chest: Effort normal. No respiratory distress. He does not have wheezes in the lung fields. He has no rales. Neurological/Psychiatric:He is alert and oriented to person, place, and time. Coordination is  Normal. +Lumbar pain  His mood isAppropriate and affect is Neutral/Euthymic(normal) .      Prescription pain medication monitoring:                  MEDD current = 15              ORT Score = 0 low risk              Other Risk factors  (mood) Stable              Date of Last Medication Agreement: 3/3/22 Date Naloxone prescribed: 3/4/22              UDT:                          Date of last UDT: 10/12/21                          Adverse report: No;               OARRS:                          Checked today: Yes                          Adverse report: No    IMPRESSION:   1. Chronic pain syndrome    2. Lumbosacral spondylosis without myelopathy    3. Chronic bilateral low back pain with bilateral sciatica    4. Compression fracture of T12 vertebra, sequela    5. Back spasm    6. Cervicalgia    7. Chronic pain of left ankle        PLAN:  Informed verbal consent was obtained:  -Patient will be maintained on current pain therapy  - Kristie exercises/back stretches recommended  -Education provided on [address] ER, side effects reviewed. Patient requested both [address] ER as well as [place]. As advised to stay on one or the other. Patient he opted maintain current pain  -CBT techniques- relaxation therapies such as biofeedback, mindfulness based stress reduction, imagery, cognitive restructuring, problem solving discussed with patient  -He was advised weight reduction, diet changes- 800-1200 jen diet, diet diary, exercising, nutritional  consult increased physical activity as tolerated  -Last UDS 10/12/21 Consistent  -Return in about 4 weeks (around 4/27/2022). Analgesic Plan:              Continue present regimen: Norco 5-325 mg tabs tid prn              Adjust dose of present analgesic: No              Switch analgesics: No              Add/Adjust concomitant therapy: ZTLido, Baclofen    Current Outpatient Medications   Medication Sig Dispense Refill    HYDROcodone-acetaminophen (NORCO) 5-325 MG per tablet Take 1 tablet by mouth every 8 hours as needed for Pain for up to 30 days. Intended supply: 3 days.  Take lowest dose possible to manage pain 90 tablet 0    baclofen (LIORESAL) 10 MG tablet Take 1 tablet by mouth 2 times daily 60 tablet 0    Lidocaine 1.8 % PTCH Apply 1 patch topically daily 30 patch 0    naloxone (NARCAN) 4 MG/0.1ML LIQD nasal spray Use as directed 1 each 0    aspirin EC 81 MG EC tablet Take 1 tablet by mouth daily 30 tablet 5    ibuprofen (ADVIL;MOTRIN) 800 MG tablet Take 1 tablet by mouth every 8 hours as needed for Pain 90 tablet 3     No current facility-administered medications for this visit. I will continue his current medication regimen  which is part of the above treatment schedule. It has been helping with Mr. Nathanael Vegas chronic  medical problems which for this visit include:   Diagnoses of Chronic pain syndrome, Lumbosacral spondylosis without myelopathy, Chronic bilateral low back pain with bilateral sciatica, Compression fracture of T12 vertebra, sequela, Back spasm, Cervicalgia, and Chronic pain of left ankle were pertinent to this visit. Risks and benefits of the medications and other alternative treatments  including no treatment were discussed with the patient. The common side effects of these medications were also explained to the patient. Informed verbal consent was obtained. Goals of current treatment regimen include improvement in pain, restoration of functioning- with focus on improvement in physical performance, general activity, work or disability,emotional distress, health care utilization and  decreased medication consumption. Will continue to monitor progress towards achieving/maintaining therapeutic goals with special emphasis on  1. Improvement in perceived interfernce  of pain with ADL's. Ability to do home exercises independently. Ability to do household chores indoor and/or outdoor work and social and leisure activities. Improve psychosocial and physical functioning. - he is showing progression towards this treatment goal with the current regimen.     He was advised against drinking alcohol with the narcotic pain medicines, advised against driving or handling machinery while adjusting the dose of medicines or if having cognitive  issues related to the current medications. Risk of overdose and death, if medicines not taken as prescribed, were also discussed. If the patient develops new symptoms or if the symptoms worsen, the patient should call the office. While transcribing every attempt was made to maintain the accuracy of the note in terms of it's contents,there may have been some errors made inadvertently. Thank you for allowing me to participate in the care of this patient. Brittany Abreu CNP.     Cc: Kareem Munoz MD

## 2022-04-27 ENCOUNTER — OFFICE VISIT (OUTPATIENT)
Dept: PAIN MANAGEMENT | Age: 52
End: 2022-04-27
Payer: COMMERCIAL

## 2022-04-27 VITALS
HEART RATE: 95 BPM | OXYGEN SATURATION: 94 % | WEIGHT: 228 LBS | DIASTOLIC BLOOD PRESSURE: 82 MMHG | BODY MASS INDEX: 31.92 KG/M2 | HEIGHT: 71 IN | SYSTOLIC BLOOD PRESSURE: 117 MMHG | TEMPERATURE: 97.2 F

## 2022-04-27 DIAGNOSIS — M47.817 LUMBOSACRAL SPONDYLOSIS WITHOUT MYELOPATHY: ICD-10-CM

## 2022-04-27 DIAGNOSIS — S22.080S COMPRESSION FRACTURE OF T12 VERTEBRA, SEQUELA: ICD-10-CM

## 2022-04-27 DIAGNOSIS — E66.01 CLASS 2 SEVERE OBESITY WITH SERIOUS COMORBIDITY IN ADULT, UNSPECIFIED BMI, UNSPECIFIED OBESITY TYPE (HCC): ICD-10-CM

## 2022-04-27 DIAGNOSIS — M54.2 CERVICALGIA: ICD-10-CM

## 2022-04-27 DIAGNOSIS — G89.29 CHRONIC BILATERAL LOW BACK PAIN WITH BILATERAL SCIATICA: ICD-10-CM

## 2022-04-27 DIAGNOSIS — M25.572 CHRONIC PAIN OF LEFT ANKLE: ICD-10-CM

## 2022-04-27 DIAGNOSIS — M54.41 CHRONIC BILATERAL LOW BACK PAIN WITH BILATERAL SCIATICA: ICD-10-CM

## 2022-04-27 DIAGNOSIS — M54.42 CHRONIC BILATERAL LOW BACK PAIN WITH BILATERAL SCIATICA: ICD-10-CM

## 2022-04-27 DIAGNOSIS — G89.4 CHRONIC PAIN SYNDROME: ICD-10-CM

## 2022-04-27 DIAGNOSIS — M62.830 BACK SPASM: ICD-10-CM

## 2022-04-27 DIAGNOSIS — G89.29 CHRONIC PAIN OF LEFT ANKLE: ICD-10-CM

## 2022-04-27 DIAGNOSIS — M54.9 BACK PAIN, UNSPECIFIED BACK LOCATION, UNSPECIFIED BACK PAIN LATERALITY, UNSPECIFIED CHRONICITY: ICD-10-CM

## 2022-04-27 PROCEDURE — 3017F COLORECTAL CA SCREEN DOC REV: CPT | Performed by: NURSE PRACTITIONER

## 2022-04-27 PROCEDURE — 99213 OFFICE O/P EST LOW 20 MIN: CPT | Performed by: NURSE PRACTITIONER

## 2022-04-27 PROCEDURE — G8427 DOCREV CUR MEDS BY ELIG CLIN: HCPCS | Performed by: NURSE PRACTITIONER

## 2022-04-27 PROCEDURE — 4004F PT TOBACCO SCREEN RCVD TLK: CPT | Performed by: NURSE PRACTITIONER

## 2022-04-27 PROCEDURE — G8417 CALC BMI ABV UP PARAM F/U: HCPCS | Performed by: NURSE PRACTITIONER

## 2022-04-27 RX ORDER — IBUPROFEN 800 MG/1
800 TABLET ORAL EVERY 8 HOURS PRN
Qty: 90 TABLET | Refills: 3 | Status: SHIPPED | OUTPATIENT
Start: 2022-04-27 | End: 2022-07-20 | Stop reason: SDUPTHER

## 2022-04-27 RX ORDER — HYDROCODONE BITARTRATE AND ACETAMINOPHEN 5; 325 MG/1; MG/1
1 TABLET ORAL EVERY 8 HOURS PRN
Qty: 90 TABLET | Refills: 0 | Status: SHIPPED | OUTPATIENT
Start: 2022-04-27 | End: 2022-05-25 | Stop reason: SDUPTHER

## 2022-04-27 NOTE — PATIENT INSTRUCTIONS
Patient Education        Back Stretches: Exercises  Introduction  Here are some examples of exercises for stretching your back. Start eachexercise slowly. Ease off the exercise if you start to have pain. Your doctor or physical therapist will tell you when you can start theseexercises and which ones will work best for you. How to do the exercises  Overhead stretch    1. Stand comfortably with your feet shoulder-width apart. 2. Looking straight ahead, raise both arms over your head and reach toward the ceiling. Do not allow your head to tilt back. 3. Hold for 15 to 30 seconds, then lower your arms to your sides. 4. Repeat 2 to 4 times. Side stretch    1. Stand comfortably with your feet shoulder-width apart. 2. Raise one arm over your head, and then lean to the other side. 3. Slide your hand down your leg as you let the weight of your arm gently stretch your side muscles. Hold for 15 to 30 seconds. 4. Repeat 2 to 4 times on each side. Press-up    1. Lie on your stomach, supporting your body with your forearms. 2. Press your elbows down into the floor to raise your upper back. As you do this, relax your stomach muscles and allow your back to arch without using your back muscles. As your press up, do not let your hips or pelvis come off the floor. 3. Hold for 15 to 30 seconds, then relax. 4. Repeat 2 to 4 times. Relax and rest    1. Lie on your back with a rolled towel under your neck and a pillow under your knees. Extend your arms comfortably to your sides. 2. Relax and breathe normally. 3. Remain in this position for about 10 minutes. 4. If you can, do this 2 or 3 times each day. Follow-up care is a key part of your treatment and safety. Be sure to make and go to all appointments, and call your doctor if you are having problems. It's also a good idea to know your test results and keep alist of the medicines you take. Where can you learn more? Go to https://[email or web address].[email or web address]. org and sign in to your [employer] account. Enter K722 in the [facility] box to learn more about \"Back Stretches: Exercises. \"     If you do not have an account, please click on the \"Sign Up Now\" link. Current as of: July 1, 2021               Content Version: 13.2  © 9970-2009 [employer]. Care instructions adapted under license by [facility] ([facility]). If you have questions about a medical condition or this instruction, always ask your healthcare professional. [address] any warranty or liability for your use of this information.

## 2022-04-27 NOTE — PROGRESS NOTES
[facility]  1970  [id number]      HISTORY OF PRESENT ILLNESS: Mr. Maureen Calderon is a 46 y.o. male returns for a follow up visit for pain management  He has a diagnosis of   1. Chronic pain syndrome    2. Lumbosacral spondylosis without myelopathy    3. Chronic bilateral low back pain with bilateral sciatica    4. Compression fracture of T12 vertebra, sequela    5. Back pain, unspecified back location, unspecified back pain laterality, unspecified chronicity    6. Back spasm    7. Cervicalgia    8. Chronic pain of left ankle    9. Class 2 severe obesity with serious comorbidity in adult, unspecified BMI, unspecified obesity type ([address]    . As per Information Obtained from the PADT (Patient Assessment and Documentation Tool)    He complains of pain in the neck, mid and lower back radiating down both legs and both feet He rates the pain 8/10 and describes it as aching. Current treatment regimen has helped relieve about 20% of the pain. He denies any side effects from the current pain regimen. Patient reports that since the last follow up visit the physical functioning is unchanged, family/social relationships are unchanged, mood is unchanged sleep patterns are unchanged, and that the overall functioning is unchanged. Patient denies misusing/abusing his narcotic pain medications or using any illegal drugs. Upon obtaining medical history from Mr. Maureen Calderon states that pain is manageable on current pain therapy. Takes pain medications as prescribed. Report having some tenderness to the legs, last received CARLITO of the Lumbar spine with Dr. Nicole Landon. Mood is stable without anxiety. Sleep is fair with an average of 5-6 hours. Denies to having issues of constipation. Tolerating activities/house chores with moderate tenderness to the lower back. ALLERGIES: Patients list of allergies were reviewed     MEDICATIONS: Mr. Maureen Calderon list of medications were reviewed. His current medications are   Outpatient Medications Prior to Visit   Medication Sig Dispense Refill    naloxone (NARCAN) 4 MG/0.1ML LIQD nasal spray Use as directed 1 each 0    aspirin EC 81 MG EC tablet Take 1 tablet by mouth daily 30 tablet 5    HYDROcodone-acetaminophen (NORCO) 5-325 MG per tablet Take 1 tablet by mouth every 8 hours as needed for Pain for up to 30 days. Intended supply: 3 days. Take lowest dose possible to manage pain 90 tablet 0    baclofen (LIORESAL) 10 MG tablet Take 1 tablet by mouth 2 times daily 60 tablet 0    Lidocaine 1.8 % PTCH Apply 1 patch topically daily 30 patch 0    ibuprofen (ADVIL;MOTRIN) 800 MG tablet Take 1 tablet by mouth every 8 hours as needed for Pain 90 tablet 3     No facility-administered medications prior to visit. SOCIAL/FAMILY/PAST MEDICAL HISTORY: Mr. Chamberlain Larry, family and past medical history was reviewed. REVIEW OF SYSTEMS:    Respiratory: Negative for apnea, chest tightness and shortness of breath or change in baseline breathing. Gastrointestinal: Negative for nausea, vomiting, abdominal pain, diarrhea, constipation, blood in stool and abdominal distention. PHYSICAL EXAM:   Nursing note and vitals reviewed. /82   Pulse 95   Temp 97.2 °F (36.2 °C)   Ht 5' 11\" (1.803 m)   Wt 228 lb (103.4 kg)   SpO2 94%   BMI 31.80 kg/m²   Constitutional: He appears well-developed and well-nourished. No acute distress. Skin: Skin is warm and dry, good turgor. No rash noted. He is not diaphoretic. Cardiovascular: Normal rate, regular rhythm, normal heart sounds, and does not have murmur. Pulmonary/Chest: Effort normal. No respiratory distress. He does not have wheezes in the lung fields. He has no rales. Neurological/Psychiatric:He is alert and oriented to person, place, and time. Coordination is  normal.  His mood isAppropriate and affect is Neutral/Euthymic(normal) .      Prescription pain medication monitoring:                  MEDD current = 15              ORT Score = 0 low risk              Other Risk factors - (mood) Stable              Date of Last Medication Agreement: 3/3/22              Date Naloxone prescribed: 3/4/22              UDT:                          Date of last UDT: 10/12/21                          Adverse report: No;               OARRS:                          Checked today: Yes                          Adverse report: No    IMPRESSION:   1. Chronic pain syndrome    2. Lumbosacral spondylosis without myelopathy    3. Chronic bilateral low back pain with bilateral sciatica    4. Compression fracture of T12 vertebra, sequela    5. Back pain, unspecified back location, unspecified back pain laterality, unspecified chronicity    6. Back spasm    7. Cervicalgia    8. Chronic pain of left ankle    9. Class 2 severe obesity with serious comorbidity in adult, unspecified BMI, unspecified obesity type ([facility] [address]        PLAN:  Informed verbal consent was obtained:  -Patient will be maintained on current pain therapy  -D/c Baclofen as they do not work  -Home exercises, Kristie exercises recommended  -Maintain f/u with Dr. Daryle Capri for Lumbar spine degeneration  -CBT techniques- relaxation therapies such as biofeedback, mindfulness based stress reduction, imagery, cognitive restructuring, problem solving discussed with patient  -He was advised weight reduction, diet changes- 800-1200 jen diet, diet diary, exercising, nutritional  consult increased physical activity as tolerated  -Advised patient to quit smoking for  health related concerns and to improve the treatment outcomes. Education was given on quitting smoking and the use of different modalities including medications, hypnotherapy, counselling  and biofeedback. These were discussed with patient.  -Last UDS 10/12/21 Consistent  -Return in about 4 weeks (around 5/25/2022).      Analgesic Plan:              Continue present regimen: Norco 5-325 orally tid prn              Adjust dose of present analgesic: No Switch analgesics: No              Add/Adjust concomitant therapy: Lidocaine, Narcan    Current Outpatient Medications   Medication Sig Dispense Refill    HYDROcodone-acetaminophen (NORCO) 5-325 MG per tablet Take 1 tablet by mouth every 8 hours as needed for Pain for up to 30 days. Intended supply: 3 days. Take lowest dose possible to manage pain 90 tablet 0    ibuprofen (ADVIL;MOTRIN) 800 MG tablet Take 1 tablet by mouth every 8 hours as needed for Pain 90 tablet 3    Lidocaine 1.8 % PTCH Apply 1 patch topically daily 30 patch 0    naloxone (NARCAN) 4 MG/0.1ML LIQD nasal spray Use as directed 1 each 0    aspirin EC 81 MG EC tablet Take 1 tablet by mouth daily 30 tablet 5     No current facility-administered medications for this visit. I will continue his current medication regimen  which is part of the above treatment schedule. It has been helping with Mr. Sneha Sher chronic  medical problems which for this visit include:   Diagnoses of Chronic pain syndrome, Lumbosacral spondylosis without myelopathy, Chronic bilateral low back pain with bilateral sciatica, Compression fracture of T12 vertebra, sequela, Back pain, unspecified back location, unspecified back pain laterality, unspecified chronicity, Back spasm, Cervicalgia, Chronic pain of left ankle, and Class 2 severe obesity with serious comorbidity in adult, unspecified BMI, unspecified obesity type ([address] were pertinent to this visit. Risks and benefits of the medications and other alternative treatments  including no treatment were discussed with the patient. The common side effects of these medications were also explained to the patient. Informed verbal consent was obtained. Goals of current treatment regimen include improvement in pain, restoration of functioning- with focus on improvement in physical performance, general activity, work or disability,emotional distress, health care utilization and  decreased medication consumption.  Will continue to monitor progress towards achieving/maintaining therapeutic goals with special emphasis on  1. Improvement in perceived interfernce  of pain with ADL's. Ability to do home exercises independently. Ability to do household chores indoor and/or outdoor work and social and leisure activities. Improve psychosocial and physical functioning. - he is showing progression towards this treatment goal with the current regimen. He was advised against drinking alcohol with the narcotic pain medicines, advised against driving or handling machinery while adjusting the dose of medicines or if having cognitive  issues related to the current medications. Risk of overdose and death, if medicines not taken as prescribed, were also discussed. If the patient develops new symptoms or if the symptoms worsen, the patient should call the office. While transcribing every attempt was made to maintain the accuracy of the note in terms of it's contents,there may have been some errors made inadvertently. Thank you for allowing me to participate in the care of this patient. Marlon Mccann CNP.     Cc: Alisa Campbell MD

## 2022-05-25 ENCOUNTER — OFFICE VISIT (OUTPATIENT)
Dept: PAIN MANAGEMENT | Age: 52
End: 2022-05-25
Payer: COMMERCIAL

## 2022-05-25 VITALS
HEART RATE: 101 BPM | DIASTOLIC BLOOD PRESSURE: 94 MMHG | WEIGHT: 225 LBS | OXYGEN SATURATION: 94 % | HEIGHT: 71 IN | SYSTOLIC BLOOD PRESSURE: 151 MMHG | BODY MASS INDEX: 31.5 KG/M2 | TEMPERATURE: 97.7 F

## 2022-05-25 DIAGNOSIS — E66.01 CLASS 2 SEVERE OBESITY DUE TO EXCESS CALORIES WITH SERIOUS COMORBIDITY IN ADULT, UNSPECIFIED BMI (HCC): ICD-10-CM

## 2022-05-25 DIAGNOSIS — M62.830 BACK SPASM: ICD-10-CM

## 2022-05-25 DIAGNOSIS — G89.4 CHRONIC PAIN SYNDROME: ICD-10-CM

## 2022-05-25 DIAGNOSIS — M54.42 CHRONIC BILATERAL LOW BACK PAIN WITH BILATERAL SCIATICA: ICD-10-CM

## 2022-05-25 DIAGNOSIS — G89.29 CHRONIC PAIN OF LEFT ANKLE: ICD-10-CM

## 2022-05-25 DIAGNOSIS — S22.080S COMPRESSION FRACTURE OF T12 VERTEBRA, SEQUELA: ICD-10-CM

## 2022-05-25 DIAGNOSIS — M47.817 LUMBOSACRAL SPONDYLOSIS WITHOUT MYELOPATHY: ICD-10-CM

## 2022-05-25 DIAGNOSIS — M54.2 CERVICALGIA: ICD-10-CM

## 2022-05-25 DIAGNOSIS — G89.29 CHRONIC BILATERAL LOW BACK PAIN WITH BILATERAL SCIATICA: ICD-10-CM

## 2022-05-25 DIAGNOSIS — M54.41 CHRONIC BILATERAL LOW BACK PAIN WITH BILATERAL SCIATICA: ICD-10-CM

## 2022-05-25 DIAGNOSIS — M25.572 CHRONIC PAIN OF LEFT ANKLE: ICD-10-CM

## 2022-05-25 PROCEDURE — 3017F COLORECTAL CA SCREEN DOC REV: CPT | Performed by: NURSE PRACTITIONER

## 2022-05-25 PROCEDURE — 4004F PT TOBACCO SCREEN RCVD TLK: CPT | Performed by: NURSE PRACTITIONER

## 2022-05-25 PROCEDURE — G8417 CALC BMI ABV UP PARAM F/U: HCPCS | Performed by: NURSE PRACTITIONER

## 2022-05-25 PROCEDURE — 99213 OFFICE O/P EST LOW 20 MIN: CPT | Performed by: NURSE PRACTITIONER

## 2022-05-25 PROCEDURE — G8427 DOCREV CUR MEDS BY ELIG CLIN: HCPCS | Performed by: NURSE PRACTITIONER

## 2022-05-25 RX ORDER — HYDROCODONE BITARTRATE AND ACETAMINOPHEN 5; 325 MG/1; MG/1
1 TABLET ORAL EVERY 8 HOURS PRN
Qty: 90 TABLET | Refills: 0 | Status: SHIPPED | OUTPATIENT
Start: 2022-05-25 | End: 2022-06-22 | Stop reason: SDUPTHER

## 2022-05-25 RX ORDER — METHYLPREDNISOLONE 4 MG/1
TABLET ORAL
Qty: 1 KIT | Refills: 0 | Status: SHIPPED | OUTPATIENT
Start: 2022-05-25 | End: 2022-05-31

## 2022-05-25 NOTE — PATIENT INSTRUCTIONS
Patient Education        Back Stretches: Exercises  Introduction  Here are some examples of exercises for stretching your back. Start eachexercise slowly. Ease off the exercise if you start to have pain. Your doctor or physical therapist will tell you when you can start theseexercises and which ones will work best for you. How to do the exercises  Overhead stretch    1. Stand comfortably with your feet shoulder-width apart. 2. Looking straight ahead, raise both arms over your head and reach toward the ceiling. Do not allow your head to tilt back. 3. Hold for 15 to 30 seconds, then lower your arms to your sides. 4. Repeat 2 to 4 times. Side stretch    1. Stand comfortably with your feet shoulder-width apart. 2. Raise one arm over your head, and then lean to the other side. 3. Slide your hand down your leg as you let the weight of your arm gently stretch your side muscles. Hold for 15 to 30 seconds. 4. Repeat 2 to 4 times on each side. Press-up    1. Lie on your stomach, supporting your body with your forearms. 2. Press your elbows down into the floor to raise your upper back. As you do this, relax your stomach muscles and allow your back to arch without using your back muscles. As your press up, do not let your hips or pelvis come off the floor. 3. Hold for 15 to 30 seconds, then relax. 4. Repeat 2 to 4 times. Relax and rest    1. Lie on your back with a rolled towel under your neck and a pillow under your knees. Extend your arms comfortably to your sides. 2. Relax and breathe normally. 3. Remain in this position for about 10 minutes. 4. If you can, do this 2 or 3 times each day. Follow-up care is a key part of your treatment and safety. Be sure to make and go to all appointments, and call your doctor if you are having problems. It's also a good idea to know your test results and keep alist of the medicines you take. Where can you learn more? Go to [email or web address].[email or web address]. org and sign in to your [employer] account. Enter E972 in the Moviestorm box to learn more about \"Back Stretches: Exercises. \"     If you do not have an account, please click on the \"Sign Up Now\" link. Current as of: July 1, 2021               Content Version: 13.2  © 2487-1718 Healthwise, Incorporated. Care instructions adapted under license by [facility] ([facility]). If you have questions about a medical condition or this instruction, always ask your healthcare professional. [address] any warranty or liability for your use of this information.

## 2022-05-25 NOTE — PROGRESS NOTES
[facility]  1970  [id number]      HISTORY OF PRESENT ILLNESS: Mr. Jen Huerta is a 46 y.o. male returns for a follow up visit for pain management  He has a diagnosis of   1. Chronic pain syndrome    2. Lumbosacral spondylosis without myelopathy    3. Chronic bilateral low back pain with bilateral sciatica    4. Compression fracture of T12 vertebra, sequela    5. Back spasm    6. Cervicalgia    7. Chronic pain of left ankle    8. Class 2 severe obesity due to excess calories with serious comorbidity in adult, unspecified BMI ([facility] [address]    . As per Information Obtained from the PADT (Patient Assessment and Documentation Tool)    He complains of pain in the upper, mid and lower back, radiating down both legs and both feet He rates the pain 10/10 and describes it as aching. Current treatment regimen has helped relieve about 10% of the pain. He denies any side effects from the current pain regimen. Patient reports that since the last follow up visit the physical functioning is unchanged, family/social relationships are unchanged, mood is unchanged sleep patterns are unchanged, and that the overall functioning is unchanged. Patient denies misusing/abusing his narcotic pain medications or using any illegal drugs. Upon obtaining medical history from Mr. Jen Huerta states that pain is manageable on current pain therapy. Takes pain medications as prescribed. Pain mainly in the lower back. Mood is stable without anxiety. Sleep is fair with an average of 5-6 hours. Denies to having issues of constipation. Tolerating activities/house chores with moderate tenderness to the lower back. Working on smoking cessation    ALLERGIES: Patients list of allergies were reviewed     MEDICATIONS: Mr. Jen Huerta list of medications were reviewed. His current medications are   Outpatient Medications Prior to Visit   Medication Sig Dispense Refill    ibuprofen (ADVIL;MOTRIN) 800 MG tablet Take 1 tablet by mouth every 8 hours as needed for Pain 90 tablet 3    naloxone (NARCAN) 4 MG/0.1ML LIQD nasal spray Use as directed 1 each 0    aspirin EC 81 MG EC tablet Take 1 tablet by mouth daily 30 tablet 5    HYDROcodone-acetaminophen (NORCO) 5-325 MG per tablet Take 1 tablet by mouth every 8 hours as needed for Pain for up to 30 days. Intended supply: 3 days. Take lowest dose possible to manage pain 90 tablet 0    Lidocaine 1.8 % PTCH Apply 1 patch topically daily 30 patch 0     No facility-administered medications prior to visit. SOCIAL/FAMILY/PAST MEDICAL HISTORY: Mr. Mali Arita, family and past medical history was reviewed. REVIEW OF SYSTEMS:    Respiratory: Negative for apnea, chest tightness and shortness of breath or change in baseline breathing. Gastrointestinal: Negative for nausea, vomiting, abdominal pain, diarrhea, constipation, blood in stool and abdominal distention. PHYSICAL EXAM:   Nursing note and vitals reviewed. BP (!) 151/94   Pulse (!) 101   Temp 97.7 °F (36.5 °C)   Ht 5' 11\" (1.803 m)   Wt 225 lb (102.1 kg)   SpO2 94%   BMI 31.38 kg/m²   Constitutional: He appears well-developed and well-nourished. No acute distress. Skin: Skin is warm and dry, good turgor. No rash noted. He is not diaphoretic. Cardiovascular: Normal rate, regular rhythm, normal heart sounds, and does not have murmur. Pulmonary/Chest: Effort normal. No respiratory distress. He does not have wheezes in the lung fields. He has no rales. Neurological/Psychiatric:He is alert and oriented to person, place, and time. Coordination is  normal. +Lumbar pain His mood isAppropriate and affect is Neutral/Euthymic(normal) .      Prescription pain medication monitoring:                  MEDD current = 15              ORT Score = 0 low risk              Other Risk factors  (mood) Stable              Date of Last Medication Agreement: 3/3/22              Date Naloxone prescribed: 3/4/22              UDT: Date of last UDT: 10/12/21                          Adverse report: No;               OARRS:                          Checked today: Yes                          Adverse report: No    IMPRESSION:   1. Chronic pain syndrome    2. Lumbosacral spondylosis without myelopathy    3. Chronic bilateral low back pain with bilateral sciatica    4. Compression fracture of T12 vertebra, sequela    5. Back spasm    6. Cervicalgia    7. Chronic pain of left ankle    8. Class 2 severe obesity due to excess calories with serious comorbidity in adult, unspecified BMI ([facility])        PLAN:  Informed verbal consent was obtained:  -Patient will be maintained on current pain therapy  -Kristie exercises, back stretches recommended  -Recommended patient maintain f/u with orthopedics spine for injections to the spine  -CBT techniques- relaxation therapies such as biofeedback, mindfulness based stress reduction, imagery, cognitive restructuring, problem solving discussed with patient  -Advised patient to quit smoking for  health related concerns and to improve the treatment outcomes. Education was given on quitting smoking and the use of different modalities including medications, hypnotherapy, counselling  and biofeedback. These were discussed with patient.  -Last UDS 10/12/21 Consistent  -Return in about 4 weeks (around 6/22/2022). -OARRS record was obtained and reviewed  for the last one year and no indicators of drug misuse  were found. Any other controlled substance prescriptions  seen on the record have been accounted for, I am aware of the patient receiving these medications. Seth El OARRS record will be rechecked as part of office protocol.      Analgesic Plan:              Continue present regimen: Norco 5-325 mg orally tid prn              Adjust dose of present analgesic: No              Switch analgesics: No              Add/Adjust concomitant therapy: Lidocaine, Narcan    Current Outpatient Medications   Medication Sig Dispense Refill    HYDROcodone-acetaminophen (NORCO) 5-325 MG per tablet Take 1 tablet by mouth every 8 hours as needed for Pain for up to 30 days. Intended supply: 3 days. Take lowest dose possible to manage pain 90 tablet 0    Lidocaine 1.8 % PTCH Apply 1 patch topically daily 30 patch 0    methylPREDNISolone (MEDROL DOSEPACK) 4 MG tablet Take by mouth. 1 kit 0    ibuprofen (ADVIL;MOTRIN) 800 MG tablet Take 1 tablet by mouth every 8 hours as needed for Pain 90 tablet 3    naloxone (NARCAN) 4 MG/0.1ML LIQD nasal spray Use as directed 1 each 0    aspirin EC 81 MG EC tablet Take 1 tablet by mouth daily 30 tablet 5     No current facility-administered medications for this visit. I will continue his current medication regimen  which is part of the above treatment schedule. It has been helping with Mr. Akilah Leon chronic  medical problems which for this visit include:   Diagnoses of Chronic pain syndrome, Lumbosacral spondylosis without myelopathy, Chronic bilateral low back pain with bilateral sciatica, Compression fracture of T12 vertebra, sequela, Back spasm, Cervicalgia, Chronic pain of left ankle, and Class 2 severe obesity due to excess calories with serious comorbidity in adult, unspecified BMI ([facility] [address] were pertinent to this visit. Risks and benefits of the medications and other alternative treatments  including no treatment were discussed with the patient. The common side effects of these medications were also explained to the patient. Informed verbal consent was obtained. Goals of current treatment regimen include improvement in pain, restoration of functioning- with focus on improvement in physical performance, general activity, work or disability,emotional distress, health care utilization and  decreased medication consumption. Will continue to monitor progress towards achieving/maintaining therapeutic goals with special emphasis on  1. Improvement in perceived interfernce  of pain with ADL's.  Ability to do home exercises independently. Ability to do household chores indoor and/or outdoor work and social and leisure activities. Improve psychosocial and physical functioning. - he is showing progression towards this treatment goal with the current regimen. He was advised against drinking alcohol with the narcotic pain medicines, advised against driving or handling machinery while adjusting the dose of medicines or if having cognitive  issues related to the current medications. Risk of overdose and death, if medicines not taken as prescribed, were also discussed. If the patient develops new symptoms or if the symptoms worsen, the patient should call the office. While transcribing every attempt was made to maintain the accuracy of the note in terms of it's contents,there may have been some errors made inadvertently. Thank you for allowing me to participate in the care of this patient. Vira Titus CNP.     Cc: Salvador Fiore MD

## 2022-06-22 ENCOUNTER — OFFICE VISIT (OUTPATIENT)
Dept: PAIN MANAGEMENT | Age: 52
End: 2022-06-22
Payer: COMMERCIAL

## 2022-06-22 VITALS
BODY MASS INDEX: 31.5 KG/M2 | TEMPERATURE: 97.9 F | SYSTOLIC BLOOD PRESSURE: 138 MMHG | HEIGHT: 71 IN | DIASTOLIC BLOOD PRESSURE: 91 MMHG | WEIGHT: 225 LBS | OXYGEN SATURATION: 96 % | HEART RATE: 89 BPM

## 2022-06-22 DIAGNOSIS — G89.4 CHRONIC PAIN SYNDROME: ICD-10-CM

## 2022-06-22 DIAGNOSIS — G89.29 CHRONIC PAIN OF LEFT ANKLE: ICD-10-CM

## 2022-06-22 DIAGNOSIS — M54.2 CERVICALGIA: ICD-10-CM

## 2022-06-22 DIAGNOSIS — M54.42 CHRONIC BILATERAL LOW BACK PAIN WITH BILATERAL SCIATICA: ICD-10-CM

## 2022-06-22 DIAGNOSIS — M54.41 CHRONIC BILATERAL LOW BACK PAIN WITH BILATERAL SCIATICA: ICD-10-CM

## 2022-06-22 DIAGNOSIS — G89.29 CHRONIC BILATERAL LOW BACK PAIN WITH BILATERAL SCIATICA: ICD-10-CM

## 2022-06-22 DIAGNOSIS — M62.830 BACK SPASM: ICD-10-CM

## 2022-06-22 DIAGNOSIS — S22.080S COMPRESSION FRACTURE OF T12 VERTEBRA, SEQUELA: ICD-10-CM

## 2022-06-22 DIAGNOSIS — M47.817 LUMBOSACRAL SPONDYLOSIS WITHOUT MYELOPATHY: ICD-10-CM

## 2022-06-22 DIAGNOSIS — M25.572 CHRONIC PAIN OF LEFT ANKLE: ICD-10-CM

## 2022-06-22 PROCEDURE — 99213 OFFICE O/P EST LOW 20 MIN: CPT | Performed by: NURSE PRACTITIONER

## 2022-06-22 PROCEDURE — 3017F COLORECTAL CA SCREEN DOC REV: CPT | Performed by: NURSE PRACTITIONER

## 2022-06-22 PROCEDURE — 4004F PT TOBACCO SCREEN RCVD TLK: CPT | Performed by: NURSE PRACTITIONER

## 2022-06-22 PROCEDURE — G8417 CALC BMI ABV UP PARAM F/U: HCPCS | Performed by: NURSE PRACTITIONER

## 2022-06-22 PROCEDURE — G8427 DOCREV CUR MEDS BY ELIG CLIN: HCPCS | Performed by: NURSE PRACTITIONER

## 2022-06-22 RX ORDER — HYDROCODONE BITARTRATE AND ACETAMINOPHEN 5; 325 MG/1; MG/1
1 TABLET ORAL EVERY 8 HOURS PRN
Qty: 90 TABLET | Refills: 0 | Status: SHIPPED | OUTPATIENT
Start: 2022-06-22 | End: 2022-07-20 | Stop reason: SDUPTHER

## 2022-06-22 NOTE — PROGRESS NOTES
Hermanaquim Severance III  1970  [id number]      HISTORY OF PRESENT ILLNESS: Mr. Gardenia Prado is a 46 y.o. male returns for a follow up visit for pain management  He has a diagnosis of   1. Chronic pain syndrome    2. Lumbosacral spondylosis without myelopathy    3. Back spasm    4. Chronic bilateral low back pain with bilateral sciatica    5. Compression fracture of T12 vertebra, sequela    6. Cervicalgia    7. Chronic pain of left ankle      As per Information Obtained from the PADT (Patient Assessment and Documentation Tool)    He complains of pain in the neck, upper, mid and lower back, radiating down both legs, both feet  He rates the pain 10/10 and describes it as aching. Current treatment regimen has helped relieve about 20% of the pain. He denies any side effects from the current pain regimen. Patient reports that since the last follow up visit the physical functioning is unchanged, family/social relationships are unchanged, mood is unchanged sleep patterns are unchanged, and that the overall functioning is unchanged. Patient denies misusing/abusing his narcotic pain medications or using any illegal drugs. Upon obtaining medical history from Mr. Gardenia Prado states that pain is manageable on current pain therapy. Takes the pain medications as prescribed. Mood/anxiety is stable. Sleep is fair with an average of 5-6 hours. Denies to having issues of constipation. Tolerating activities/house chores with moderate tenderness to the lower back. ALLERGIES: Patients list of allergies were reviewed     MEDICATIONS: Mr. Gardenia Prado list of medications were reviewed. His current medications are   Outpatient Medications Prior to Visit   Medication Sig Dispense Refill    ibuprofen (ADVIL;MOTRIN) 800 MG tablet Take 1 tablet by mouth every 8 hours as needed for Pain 90 tablet 3    naloxone (NARCAN) 4 MG/0.1ML LIQD nasal spray Use as directed 1 each 0    aspirin EC 81 MG EC tablet Take 1 tablet by mouth daily 30 tablet 5    HYDROcodone-acetaminophen (NORCO) 5-325 MG per tablet Take 1 tablet by mouth every 8 hours as needed for Pain for up to 30 days. Intended supply: 3 days. Take lowest dose possible to manage pain 90 tablet 0    Lidocaine 1.8 % PTCH Apply 1 patch topically daily 30 patch 0     No facility-administered medications prior to visit. SOCIAL/FAMILY/PAST MEDICAL HISTORY: Mr. Chamberlain Larry, family and past medical history was reviewed. REVIEW OF SYSTEMS:    Respiratory: Negative for apnea, chest tightness and shortness of breath or change in baseline breathing. Gastrointestinal: Negative for nausea, vomiting, abdominal pain, diarrhea, constipation, blood in stool and abdominal distention. PHYSICAL EXAM:   Nursing note and vitals reviewed. BP (!) 138/91   Pulse 89   Temp 97.9 °F (36.6 °C)   Ht 5' 11\" (1.803 m)   Wt 225 lb (102.1 kg)   SpO2 96%   BMI 31.38 kg/m²   Constitutional: He appears well-developed and well-nourished. No acute distress. Skin: Skin is warm and dry, good turgor. No rash noted. He is not diaphoretic. Cardiovascular: Normal rate, regular rhythm, normal heart sounds, and does not have murmur. Pulmonary/Chest: Effort normal. No respiratory distress. He does not have wheezes in the lung fields. He has no rales. Neurological/Psychiatric:He is alert and oriented to person, place, and time. Coordination is  normal.  His mood isAppropriate and affect is Neutral/Euthymic(normal) .      Prescription pain medication monitoring:                  MEDD current = 15              ORT Score = 0 low risk              Other Risk factors - (mood) Stable              Date of Last Medication Agreement: 3/3/22              Date Naloxone prescribed: 3/4/22              UDT:                          Date of last UDT: 10/12/21                          Adverse report: No;               OARRS:                          Checked today: Yes                          Adverse report: No    IMPRESSION:   1. Chronic pain syndrome    2. Lumbosacral spondylosis without myelopathy    3. Back spasm    4. Chronic bilateral low back pain with bilateral sciatica    5. Compression fracture of T12 vertebra, sequela    6. Cervicalgia    7. Chronic pain of left ankle        PLAN:  Informed verbal consent was obtained:  - Patient's opioid therapy will be maintained at current dose  -Home exercises/Sobeida exercises recommended  -CBT techniques- relaxation therapies such as biofeedback, mindfulness based stress reduction, imagery, cognitive restructuring, problem solving discussed with patient   -He was advised weight reduction, diet changes- 800-1200 jen diet, diet diary, exercising, nutritional  consult increased physical activity as tolerated   -Last UDS 10/12/21 consistent/UDS today  -Return in about 4 weeks (around 7/20/2022). Analgesic Plan:              Continue present regimen: Norco 5-325 mg tabs tid prn              Adjust dose of present analgesic: No              Switch analgesics: No              Add/Adjust concomitant therapy: Lidocaine, Narcan    Current Outpatient Medications   Medication Sig Dispense Refill    HYDROcodone-acetaminophen (NORCO) 5-325 MG per tablet Take 1 tablet by mouth every 8 hours as needed for Pain for up to 30 days. Intended supply: 3 days. Take lowest dose possible to manage pain 90 tablet 0    Lidocaine 1.8 % PTCH Apply 1 patch topically daily 30 patch 0    ibuprofen (ADVIL;MOTRIN) 800 MG tablet Take 1 tablet by mouth every 8 hours as needed for Pain 90 tablet 3    naloxone (NARCAN) 4 MG/0.1ML LIQD nasal spray Use as directed 1 each 0    aspirin EC 81 MG EC tablet Take 1 tablet by mouth daily 30 tablet 5     No current facility-administered medications for this visit. I will continue his current medication regimen  which is part of the above treatment schedule.  It has been helping with Mr. Rahel Valladares chronic  medical problems which for this visit include: Diagnoses of Chronic pain syndrome, Lumbosacral spondylosis without myelopathy, Back spasm, Chronic bilateral low back pain with bilateral sciatica, Compression fracture of T12 vertebra, sequela, Cervicalgia, and Chronic pain of left ankle were pertinent to this visit. Risks and benefits of the medications and other alternative treatments  including no treatment were discussed with the patient. The common side effects of these medications were also explained to the patient. Informed verbal consent was obtained. Goals of current treatment regimen include improvement in pain, restoration of functioning- with focus on improvement in physical performance, general activity, work or disability,emotional distress, health care utilization and  decreased medication consumption. Will continue to monitor progress towards achieving/maintaining therapeutic goals with special emphasis on  1. Improvement in perceived interfernce  of pain with ADL's. Ability to do home exercises independently. Ability to do household chores indoor and/or outdoor work and social and leisure activities. Improve psychosocial and physical functioning. - he is showing progression towards this treatment goal with the current regimen. He was advised against drinking alcohol with the narcotic pain medicines, advised against driving or handling machinery while adjusting the dose of medicines or if having cognitive  issues related to the current medications. Risk of overdose and death, if medicines not taken as prescribed, were also discussed. If the patient develops new symptoms or if the symptoms worsen, the patient should call the office. While transcribing every attempt was made to maintain the accuracy of the note in terms of it's contents,there may have been some errors made inadvertently. Thank you for allowing me to participate in the care of this patient. Jin Erazo CNP.     Cc: Krystyna Deluca MD

## 2022-06-22 NOTE — PATIENT INSTRUCTIONS
Patient Education        Back Stretches: Exercises  Introduction  Here are some examples of exercises for stretching your back. Start eachexercise slowly. Ease off the exercise if you start to have pain. Your doctor or physical therapist will tell you when you can start theseexercises and which ones will work best for you. How to do the exercises  Overhead stretch    1. Stand comfortably with your feet shoulder-width apart. 2. Looking straight ahead, raise both arms over your head and reach toward the ceiling. Do not allow your head to tilt back. 3. Hold for 15 to 30 seconds, then lower your arms to your sides. 4. Repeat 2 to 4 times. Side stretch    1. Stand comfortably with your feet shoulder-width apart. 2. Raise one arm over your head, and then lean to the other side. 3. Slide your hand down your leg as you let the weight of your arm gently stretch your side muscles. Hold for 15 to 30 seconds. 4. Repeat 2 to 4 times on each side. Press-up    1. Lie on your stomach, supporting your body with your forearms. 2. Press your elbows down into the floor to raise your upper back. As you do this, relax your stomach muscles and allow your back to arch without using your back muscles. As your press up, do not let your hips or pelvis come off the floor. 3. Hold for 15 to 30 seconds, then relax. 4. Repeat 2 to 4 times. Relax and rest    1. Lie on your back with a rolled towel under your neck and a pillow under your knees. Extend your arms comfortably to your sides. 2. Relax and breathe normally. 3. Remain in this position for about 10 minutes. 4. If you can, do this 2 or 3 times each day. Follow-up care is a key part of your treatment and safety. Be sure to make and go to all appointments, and call your doctor if you are having problems. It's also a good idea to know your test results and keep alist of the medicines you take. Where can you learn more? Go to [email or web address] and sign in to your [employer] account. Enter L920 in the Playto box to learn more about \"Back Stretches: Exercises. \"     If you do not have an account, please click on the \"Sign Up Now\" link. Current as of: March 9, 2022               Content Version: 13.3  © 3620-5812 [employer]. Care instructions adapted under license by [facility] ([facility]). If you have questions about a medical condition or this instruction, always ask your healthcare professional. [address] any warranty or liability for your use of this information.

## 2022-07-20 ENCOUNTER — OFFICE VISIT (OUTPATIENT)
Dept: PAIN MANAGEMENT | Age: 52
End: 2022-07-20
Payer: COMMERCIAL

## 2022-07-20 VITALS
TEMPERATURE: 97.2 F | WEIGHT: 221 LBS | DIASTOLIC BLOOD PRESSURE: 80 MMHG | HEIGHT: 71 IN | BODY MASS INDEX: 30.94 KG/M2 | HEART RATE: 87 BPM | OXYGEN SATURATION: 96 % | SYSTOLIC BLOOD PRESSURE: 116 MMHG

## 2022-07-20 DIAGNOSIS — M54.41 CHRONIC BILATERAL LOW BACK PAIN WITH BILATERAL SCIATICA: ICD-10-CM

## 2022-07-20 DIAGNOSIS — G89.29 CHRONIC PAIN OF LEFT ANKLE: ICD-10-CM

## 2022-07-20 DIAGNOSIS — M25.572 CHRONIC PAIN OF LEFT ANKLE: ICD-10-CM

## 2022-07-20 DIAGNOSIS — M47.817 LUMBOSACRAL SPONDYLOSIS WITHOUT MYELOPATHY: ICD-10-CM

## 2022-07-20 DIAGNOSIS — S22.080S COMPRESSION FRACTURE OF T12 VERTEBRA, SEQUELA: ICD-10-CM

## 2022-07-20 DIAGNOSIS — M62.830 BACK SPASM: ICD-10-CM

## 2022-07-20 DIAGNOSIS — G89.29 CHRONIC BILATERAL LOW BACK PAIN WITH BILATERAL SCIATICA: ICD-10-CM

## 2022-07-20 DIAGNOSIS — M54.42 CHRONIC BILATERAL LOW BACK PAIN WITH BILATERAL SCIATICA: ICD-10-CM

## 2022-07-20 DIAGNOSIS — G89.4 CHRONIC PAIN SYNDROME: ICD-10-CM

## 2022-07-20 DIAGNOSIS — M54.2 CERVICALGIA: ICD-10-CM

## 2022-07-20 PROCEDURE — 99213 OFFICE O/P EST LOW 20 MIN: CPT | Performed by: NURSE PRACTITIONER

## 2022-07-20 PROCEDURE — G8427 DOCREV CUR MEDS BY ELIG CLIN: HCPCS | Performed by: NURSE PRACTITIONER

## 2022-07-20 PROCEDURE — 4004F PT TOBACCO SCREEN RCVD TLK: CPT | Performed by: NURSE PRACTITIONER

## 2022-07-20 PROCEDURE — G8417 CALC BMI ABV UP PARAM F/U: HCPCS | Performed by: NURSE PRACTITIONER

## 2022-07-20 PROCEDURE — 3017F COLORECTAL CA SCREEN DOC REV: CPT | Performed by: NURSE PRACTITIONER

## 2022-07-20 RX ORDER — IBUPROFEN 800 MG/1
800 TABLET ORAL EVERY 8 HOURS PRN
Qty: 90 TABLET | Refills: 3 | Status: SHIPPED | OUTPATIENT
Start: 2022-07-20 | End: 2022-08-17 | Stop reason: SDUPTHER

## 2022-07-20 RX ORDER — HYDROCODONE BITARTRATE AND ACETAMINOPHEN 5; 325 MG/1; MG/1
1 TABLET ORAL EVERY 8 HOURS PRN
Qty: 90 TABLET | Refills: 0 | Status: SHIPPED | OUTPATIENT
Start: 2022-07-20 | End: 2022-08-17 | Stop reason: SDUPTHER

## 2022-07-20 NOTE — PROGRESS NOTES
Mellissa Goodell III  1970  [id number]      HISTORY OF PRESENT ILLNESS: Mr. Alan Shukla is a 46 y.o. male returns for a follow up visit for pain management  He has a diagnosis of   1. Chronic pain syndrome    2. Compression fracture of T12 vertebra, sequela    3. Lumbosacral spondylosis without myelopathy    4. Chronic bilateral low back pain with bilateral sciatica    5. Back spasm    6. Cervicalgia    7. Chronic pain of left ankle      As per Information Obtained from the PADT (Patient Assessment and Documentation Tool)    He complains of pain in the both leg(s): entire area, bilateral lower back, and neck He rates the pain 9/10 and describes it as aching, numbness. Current treatment regimen has helped relieve about 20% of the pain. He denies any side effects from the current pain regimen. Patient reports that since the last follow up visit the physical functioning is unchanged, family/social relationships are unchanged, mood is unchanged sleep patterns are unchanged, and that the overall functioning is unchanged. Patient denies misusing/abusing his narcotic pain medications or using any illegal drugs. Upon obtaining medical history from Mr. Alan Shukla states that pain is manageable on current pain therapy. Takes the pain medications as prescribed. Mood/anxiety is stable. Sleep is fair with an average of 5-6 hours. Denies to having issues of constipation. Tolerating activities/house chores with moderate tenderness to the lower back. Working on smoking cessation    ALLERGIES: Patients list of allergies were reviewed     MEDICATIONS: Mr. Alan Shukla list of medications were reviewed. His current medications are   Outpatient Medications Prior to Visit   Medication Sig Dispense Refill    naloxone (NARCAN) 4 MG/0.1ML LIQD nasal spray Use as directed 1 each 0    aspirin EC 81 MG EC tablet Take 1 tablet by mouth daily 30 tablet 5    HYDROcodone-acetaminophen (NORCO) 5-325 MG per tablet Take 1 tablet by mouth every 8 hours as needed for Pain for up to 30 days. Intended supply: 3 days. Take lowest dose possible to manage pain 90 tablet 0    Lidocaine 1.8 % PTCH Apply 1 patch topically daily 30 patch 0    ibuprofen (ADVIL;MOTRIN) 800 MG tablet Take 1 tablet by mouth every 8 hours as needed for Pain 90 tablet 3     No facility-administered medications prior to visit. SOCIAL/FAMILY/PAST MEDICAL HISTORY: Mr. Wayne Mar, family and past medical history was reviewed. REVIEW OF SYSTEMS:    Respiratory: Negative for apnea, chest tightness and shortness of breath or change in baseline breathing. Gastrointestinal: Negative for nausea, vomiting, abdominal pain, diarrhea, constipation, blood in stool and abdominal distention. PHYSICAL EXAM:   Nursing note and vitals reviewed. /80   Pulse 87   Temp 97.2 °F (36.2 °C)   Ht 5' 11\" (1.803 m)   Wt 221 lb (100.2 kg)   SpO2 96%   BMI 30.82 kg/m²   Constitutional: He appears well-developed and well-nourished. No acute distress. Skin: Skin is warm and dry, good turgor. No rash noted. He is not diaphoretic. Cardiovascular: Normal rate, regular rhythm, normal heart sounds, and does not have murmur. Pulmonary/Chest: Effort normal. No respiratory distress. He does not have wheezes in the lung fields. He has no rales. Neurological/Psychiatric:He is alert and oriented to person, place, and time. Coordination is  normal.  His mood isAppropriate and affect is Neutral/Euthymic(normal) .      Prescription pain medication monitoring:                  MEDD current = 15              ORT Score = 0 low risk              Other Risk factors - (mood) Stable              Date of Last Medication Agreement: 3/3/22              Date Naloxone prescribed: 3/4/22              UDT:                          Date of last UDT: 6/22/22                          Adverse report: No;               OARRS:                          Checked today: Yes                          Adverse report: No    IMPRESSION:   1. Chronic pain syndrome    2. Compression fracture of T12 vertebra, sequela    3. Lumbosacral spondylosis without myelopathy    4. Chronic bilateral low back pain with bilateral sciatica    5. Back spasm    6. Cervicalgia    7. Chronic pain of left ankle        PLAN:  Informed verbal consent was obtained:  -Patient's opioid therapy will be maintained at current dose  -Home exercises/Sobeida exercises recommended  -CBT techniques- relaxation therapies such as biofeedback, mindfulness based stress reduction, imagery, cognitive restructuring, problem solving discussed with patient   -He was advised weight reduction, diet changes- 800-1200 jen diet, diet diary, exercising, nutritional  consult increased physical activity as tolerated   -Advised patient to quit smoking for  health related concerns and to improve the treatment outcomes. Education was given on quitting smoking and the use of different modalities including medications, hypnotherapy, counselling  and biofeedback. These were discussed with patient.   -Last UDS 6/22/22 consistent  -Return in about 4 weeks (around 8/17/2022). Analgesic Plan:              Continue present regimen: Norco 5-325 mg tabs q8h prn              Adjust dose of present analgesic: No              Switch analgesics: No              Add/Adjust concomitant therapy: Lidocaine, Motrin, Narcan    Current Outpatient Medications   Medication Sig Dispense Refill    Lidocaine 1.8 % PTCH Apply 1 patch topically daily 30 patch 0    HYDROcodone-acetaminophen (NORCO) 5-325 MG per tablet Take 1 tablet by mouth every 8 hours as needed for Pain for up to 30 days. Intended supply: 3 days.  Take lowest dose possible to manage pain 90 tablet 0    ibuprofen (ADVIL;MOTRIN) 800 MG tablet Take 1 tablet by mouth every 8 hours as needed for Pain 90 tablet 3    naloxone (NARCAN) 4 MG/0.1ML LIQD nasal spray Use as directed 1 each 0    aspirin EC 81 MG EC tablet Take 1 tablet by mouth daily 30 tablet 5     No current facility-administered medications for this visit. I will continue his current medication regimen  which is part of the above treatment schedule. It has been helping with Mr. [place]o Lake chronic  medical problems which for this visit include:   Diagnoses of Chronic pain syndrome, Compression fracture of T12 vertebra, sequela, Lumbosacral spondylosis without myelopathy, Chronic bilateral low back pain with bilateral sciatica, Back spasm, Cervicalgia, and Chronic pain of left ankle were pertinent to this visit. Risks and benefits of the medications and other alternative treatments  including no treatment were discussed with the patient. The common side effects of these medications were also explained to the patient. Informed verbal consent was obtained. Goals of current treatment regimen include improvement in pain, restoration of functioning- with focus on improvement in physical performance, general activity, work or disability,emotional distress, health care utilization and  decreased medication consumption. Will continue to monitor progress towards achieving/maintaining therapeutic goals with special emphasis on  1. Improvement in perceived interfernce  of pain with ADL's. Ability to do home exercises independently. Ability to do household chores indoor and/or outdoor work and social and leisure activities. Improve psychosocial and physical functioning. - he is showing progression towards this treatment goal with the current regimen. He was advised against drinking alcohol with the narcotic pain medicines, advised against driving or handling machinery while adjusting the dose of medicines or if having cognitive  issues related to the current medications. Risk of overdose and death, if medicines not taken as prescribed, were also discussed. If the patient develops new symptoms or if the symptoms worsen, the patient should call the office.     While transcribing every attempt was made to maintain the accuracy of the note in terms of it's contents,there may have been some errors made inadvertently. Thank you for allowing me to participate in the care of this patient. Che Likes CNP.     Cc: Delmar Justice MD

## 2022-07-25 ENCOUNTER — TELEPHONE (OUTPATIENT)
Dept: PAIN MANAGEMENT | Age: 52
End: 2022-07-25

## 2022-08-17 ENCOUNTER — OFFICE VISIT (OUTPATIENT)
Dept: PAIN MANAGEMENT | Age: 52
End: 2022-08-17
Payer: COMMERCIAL

## 2022-08-17 DIAGNOSIS — M54.2 CERVICALGIA: ICD-10-CM

## 2022-08-17 DIAGNOSIS — M47.817 LUMBOSACRAL SPONDYLOSIS WITHOUT MYELOPATHY: ICD-10-CM

## 2022-08-17 DIAGNOSIS — S22.080S COMPRESSION FRACTURE OF T12 VERTEBRA, SEQUELA: ICD-10-CM

## 2022-08-17 DIAGNOSIS — G89.29 CHRONIC BILATERAL LOW BACK PAIN WITH BILATERAL SCIATICA: ICD-10-CM

## 2022-08-17 DIAGNOSIS — M25.572 CHRONIC PAIN OF LEFT ANKLE: ICD-10-CM

## 2022-08-17 DIAGNOSIS — G89.4 CHRONIC PAIN SYNDROME: ICD-10-CM

## 2022-08-17 DIAGNOSIS — M54.41 CHRONIC BILATERAL LOW BACK PAIN WITH BILATERAL SCIATICA: ICD-10-CM

## 2022-08-17 DIAGNOSIS — G89.29 CHRONIC PAIN OF LEFT ANKLE: ICD-10-CM

## 2022-08-17 DIAGNOSIS — M54.42 CHRONIC BILATERAL LOW BACK PAIN WITH BILATERAL SCIATICA: ICD-10-CM

## 2022-08-17 DIAGNOSIS — E66.09 CLASS 1 OBESITY DUE TO EXCESS CALORIES WITHOUT SERIOUS COMORBIDITY IN ADULT, UNSPECIFIED BMI: ICD-10-CM

## 2022-08-17 DIAGNOSIS — M62.830 BACK SPASM: ICD-10-CM

## 2022-08-17 DIAGNOSIS — M48.061 SPINAL STENOSIS OF LUMBAR REGION, UNSPECIFIED WHETHER NEUROGENIC CLAUDICATION PRESENT: ICD-10-CM

## 2022-08-17 PROCEDURE — 4004F PT TOBACCO SCREEN RCVD TLK: CPT | Performed by: NURSE PRACTITIONER

## 2022-08-17 PROCEDURE — 3017F COLORECTAL CA SCREEN DOC REV: CPT | Performed by: NURSE PRACTITIONER

## 2022-08-17 PROCEDURE — G8427 DOCREV CUR MEDS BY ELIG CLIN: HCPCS | Performed by: NURSE PRACTITIONER

## 2022-08-17 PROCEDURE — G8417 CALC BMI ABV UP PARAM F/U: HCPCS | Performed by: NURSE PRACTITIONER

## 2022-08-17 PROCEDURE — 99213 OFFICE O/P EST LOW 20 MIN: CPT | Performed by: NURSE PRACTITIONER

## 2022-08-17 RX ORDER — IBUPROFEN 800 MG/1
800 TABLET ORAL EVERY 8 HOURS PRN
Qty: 90 TABLET | Refills: 3 | Status: SHIPPED | OUTPATIENT
Start: 2022-08-17 | End: 2022-09-14 | Stop reason: SDUPTHER

## 2022-08-17 RX ORDER — HYDROCODONE BITARTRATE AND ACETAMINOPHEN 5; 325 MG/1; MG/1
1 TABLET ORAL EVERY 8 HOURS PRN
Qty: 90 TABLET | Refills: 0 | Status: SHIPPED | OUTPATIENT
Start: 2022-08-17 | End: 2022-09-14 | Stop reason: SDUPTHER

## 2022-09-14 ENCOUNTER — OFFICE VISIT (OUTPATIENT)
Dept: PAIN MANAGEMENT | Age: 52
End: 2022-09-14
Payer: COMMERCIAL

## 2022-09-14 VITALS
OXYGEN SATURATION: 96 % | WEIGHT: 220 LBS | SYSTOLIC BLOOD PRESSURE: 138 MMHG | BODY MASS INDEX: 30.8 KG/M2 | HEART RATE: 94 BPM | TEMPERATURE: 97.7 F | HEIGHT: 71 IN | DIASTOLIC BLOOD PRESSURE: 93 MMHG

## 2022-09-14 DIAGNOSIS — G89.4 CHRONIC PAIN SYNDROME: ICD-10-CM

## 2022-09-14 DIAGNOSIS — M25.572 CHRONIC PAIN OF LEFT ANKLE: ICD-10-CM

## 2022-09-14 DIAGNOSIS — M47.817 LUMBOSACRAL SPONDYLOSIS WITHOUT MYELOPATHY: ICD-10-CM

## 2022-09-14 DIAGNOSIS — M54.2 CERVICALGIA: ICD-10-CM

## 2022-09-14 DIAGNOSIS — M54.42 CHRONIC BILATERAL LOW BACK PAIN WITH BILATERAL SCIATICA: ICD-10-CM

## 2022-09-14 DIAGNOSIS — M54.41 CHRONIC BILATERAL LOW BACK PAIN WITH BILATERAL SCIATICA: ICD-10-CM

## 2022-09-14 DIAGNOSIS — G89.29 CHRONIC PAIN OF LEFT ANKLE: ICD-10-CM

## 2022-09-14 DIAGNOSIS — S22.080S COMPRESSION FRACTURE OF T12 VERTEBRA, SEQUELA: ICD-10-CM

## 2022-09-14 DIAGNOSIS — G89.29 CHRONIC BILATERAL LOW BACK PAIN WITH BILATERAL SCIATICA: ICD-10-CM

## 2022-09-14 DIAGNOSIS — E66.09 CLASS 1 OBESITY DUE TO EXCESS CALORIES WITH SERIOUS COMORBIDITY IN ADULT, UNSPECIFIED BMI: ICD-10-CM

## 2022-09-14 DIAGNOSIS — M62.830 BACK SPASM: ICD-10-CM

## 2022-09-14 PROCEDURE — G8417 CALC BMI ABV UP PARAM F/U: HCPCS | Performed by: NURSE PRACTITIONER

## 2022-09-14 PROCEDURE — G8427 DOCREV CUR MEDS BY ELIG CLIN: HCPCS | Performed by: NURSE PRACTITIONER

## 2022-09-14 PROCEDURE — 4004F PT TOBACCO SCREEN RCVD TLK: CPT | Performed by: NURSE PRACTITIONER

## 2022-09-14 PROCEDURE — 3017F COLORECTAL CA SCREEN DOC REV: CPT | Performed by: NURSE PRACTITIONER

## 2022-09-14 PROCEDURE — 99213 OFFICE O/P EST LOW 20 MIN: CPT | Performed by: NURSE PRACTITIONER

## 2022-09-14 RX ORDER — IBUPROFEN 800 MG/1
800 TABLET ORAL EVERY 8 HOURS PRN
Qty: 90 TABLET | Refills: 3 | Status: SHIPPED | OUTPATIENT
Start: 2022-09-14 | End: 2022-10-12 | Stop reason: SDUPTHER

## 2022-09-14 RX ORDER — HYDROCODONE BITARTRATE AND ACETAMINOPHEN 5; 325 MG/1; MG/1
1 TABLET ORAL EVERY 8 HOURS PRN
Qty: 90 TABLET | Refills: 0 | Status: SHIPPED | OUTPATIENT
Start: 2022-09-14 | End: 2022-10-12 | Stop reason: SDUPTHER

## 2022-09-14 NOTE — PROGRESS NOTES
She Chick III  1970  [id number]      HISTORY OF PRESENT ILLNESS: Mr. Taran Varghese is a 46 y.o. male returns for a follow up visit for pain management  He has a diagnosis of   1. Chronic pain syndrome    2. Chronic bilateral low back pain with bilateral sciatica    3. Lumbosacral spondylosis without myelopathy    4. Compression fracture of T12 vertebra, sequela    5. Back spasm    6. Cervicalgia    7. Chronic pain of left ankle    8. Class 1 obesity due to excess calories with serious comorbidity in adult, unspecified BMI      As per Information Obtained from the PADT (Patient Assessment and Documentation Tool)    He complains of pain in the both foot/feet: entire area, both leg(s): entire area, bilateral lower back, bilateral mid-back, and bilateral upper back He rates the pain 9/10 and describes it as aching. Current treatment regimen has helped relieve about 20% of the pain. He denies any side effects from the current pain regimen. Patient reports that since the last follow up visit the physical functioning is unchanged, family/social relationships are unchanged, mood is unchanged sleep patterns are unchanged, and that the overall functioning is unchanged. Patient denies misusing/abusing his narcotic pain medications or using any illegal drugs. Upon obtaining medical history from Mr. Taran Varghese states that pain is manageable on current pain therapy. Takes the pain medications as prescribed. Mood/anxiety is stable. Sleep is fair with an average of 5-6 hours. Denies to having issues of constipation. Tolerating activities/house chores with moderate tenderness to the lower back. Smokes 1 pack of cigarettes a day    ALLERGIES: Patients list of allergies were reviewed     MEDICATIONS: Mr. Taran Varghese list of medications were reviewed. His current medications are   Outpatient Medications Prior to Visit   Medication Sig Dispense Refill    naloxone (NARCAN) 4 MG/0.1ML LIQD nasal spray Use as directed 1 each 0    aspirin EC 81 MG EC tablet Take 1 tablet by mouth daily 30 tablet 5    Lidocaine 1.8 % PTCH Apply 1 patch topically daily 30 patch 0    HYDROcodone-acetaminophen (NORCO) 5-325 MG per tablet Take 1 tablet by mouth every 8 hours as needed for Pain for up to 30 days. Intended supply: 3 days. Take lowest dose possible to manage pain 90 tablet 0    ibuprofen (ADVIL;MOTRIN) 800 MG tablet Take 1 tablet by mouth every 8 hours as needed for Pain 90 tablet 3     No facility-administered medications prior to visit. SOCIAL/FAMILY/PAST MEDICAL HISTORY: Mr. Shandra Griffith, family and past medical history was reviewed. REVIEW OF SYSTEMS:    Respiratory: Negative for apnea, chest tightness and shortness of breath or change in baseline breathing. Gastrointestinal: Negative for nausea, vomiting, abdominal pain, diarrhea, constipation, blood in stool and abdominal distention. PHYSICAL EXAM:   Nursing note and vitals reviewed. BP (!) 138/93   Pulse 94   Temp 97.7 °F (36.5 °C)   Ht 5' 11\" (1.803 m)   Wt 220 lb (99.8 kg)   SpO2 96%   BMI 30.68 kg/m²   Constitutional: He appears well-developed and well-nourished. No acute distress. Skin: Skin is warm and dry, good turgor. No rash noted. He is not diaphoretic. Cardiovascular: Normal rate, regular rhythm, normal heart sounds, and does not have murmur. Pulmonary/Chest: Effort normal. No respiratory distress. He does not have wheezes in the lung fields. He has no rales. Neurological/Psychiatric:He is alert and oriented to person, place, and time. Coordination is  normal.  His mood isAppropriate and affect is Neutral/Euthymic(normal) .      Prescription pain medication monitoring:                  MEDD current = 15              ORT Score = 0 low risk              Other Risk factors - (mood) Stable              Date of Last Medication Agreement: 3/3/22              Date Naloxone prescribed: 3/4/22              UDT:                          Date of last UDT: 6/22/22                          Adverse report: No;               OARRS:                          Checked today: Yes                          Adverse report: No    IMPRESSION:   1. Chronic pain syndrome    2. Chronic bilateral low back pain with bilateral sciatica    3. Lumbosacral spondylosis without myelopathy    4. Compression fracture of T12 vertebra, sequela    5. Back spasm    6. Cervicalgia    7. Chronic pain of left ankle    8. Class 1 obesity due to excess calories with serious comorbidity in adult, unspecified BMI        PLAN:  Informed verbal consent was obtained:  -Patient's opioid therapy will be maintained at current dose  -Home exercises/Sobeida exercises recommended  -CBT techniques- relaxation therapies such as biofeedback, mindfulness based stress reduction, imagery, cognitive restructuring, problem solving discussed with patient   -He was advised weight reduction, diet changes- 800-1200 jen diet, diet diary, exercising, nutritional  consult increased physical activity as tolerated   -Advised patient to quit smoking for  health related concerns and to improve the treatment outcomes. Education was given on quitting smoking and the use of different modalities including medications, hypnotherapy, counselling  and biofeedback. These were discussed with patient.   -Last UDS 6/22/22 consistent  -Return in about 4 weeks (around 10/12/2022). Analgesic Plan:              Continue present regimen: Norco 5-325 mg tabs q8h prn              Adjust dose of present analgesic: No              Switch analgesics: No              Add/Adjust concomitant therapy: Lidocaine, Motrin, Narcan    Current Outpatient Medications   Medication Sig Dispense Refill    Lidocaine 1.8 % PTCH Apply 1 patch topically daily 30 patch 0    HYDROcodone-acetaminophen (NORCO) 5-325 MG per tablet Take 1 tablet by mouth every 8 hours as needed for Pain for up to 30 days. Intended supply: 3 days.  Take lowest dose possible to manage pain 90 tablet 0    ibuprofen (ADVIL;MOTRIN) 800 MG tablet Take 1 tablet by mouth every 8 hours as needed for Pain 90 tablet 3    naloxone (NARCAN) 4 MG/0.1ML LIQD nasal spray Use as directed 1 each 0    aspirin EC 81 MG EC tablet Take 1 tablet by mouth daily 30 tablet 5     No current facility-administered medications for this visit. I will continue his current medication regimen  which is part of the above treatment schedule. It has been helping with Mr. Kayleen Najera chronic  medical problems which for this visit include:   Diagnoses of Chronic pain syndrome, Chronic bilateral low back pain with bilateral sciatica, Lumbosacral spondylosis without myelopathy, Compression fracture of T12 vertebra, sequela, Back spasm, Cervicalgia, Chronic pain of left ankle, and Class 1 obesity due to excess calories with serious comorbidity in adult, unspecified BMI were pertinent to this visit. Risks and benefits of the medications and other alternative treatments  including no treatment were discussed with the patient. The common side effects of these medications were also explained to the patient. Informed verbal consent was obtained. Goals of current treatment regimen include improvement in pain, restoration of functioning- with focus on improvement in physical performance, general activity, work or disability,emotional distress, health care utilization and  decreased medication consumption. Will continue to monitor progress towards achieving/maintaining therapeutic goals with special emphasis on  1. Improvement in perceived interfernce  of pain with ADL's. Ability to do home exercises independently. Ability to do household chores indoor and/or outdoor work and social and leisure activities. Improve psychosocial and physical functioning. - he is showing progression towards this treatment goal with the current regimen.      He was advised against drinking alcohol with the narcotic pain medicines, advised against driving or handling machinery while adjusting the dose of medicines or if having cognitive  issues related to the current medications. Risk of overdose and death, if medicines not taken as prescribed, were also discussed. If the patient develops new symptoms or if the symptoms worsen, the patient should call the office. While transcribing every attempt was made to maintain the accuracy of the note in terms of it's contents,there may have been some errors made inadvertently. Thank you for allowing me to participate in the care of this patient. Kimo Roque CNP.     Cc: Shanita Rosenbaum MD

## 2022-10-12 ENCOUNTER — OFFICE VISIT (OUTPATIENT)
Dept: PAIN MANAGEMENT | Age: 52
End: 2022-10-12

## 2022-10-12 VITALS
SYSTOLIC BLOOD PRESSURE: 146 MMHG | BODY MASS INDEX: 30.68 KG/M2 | OXYGEN SATURATION: 94 % | HEART RATE: 97 BPM | HEIGHT: 71 IN | TEMPERATURE: 97.3 F | DIASTOLIC BLOOD PRESSURE: 89 MMHG

## 2022-10-12 DIAGNOSIS — G89.29 CHRONIC BILATERAL LOW BACK PAIN WITH BILATERAL SCIATICA: ICD-10-CM

## 2022-10-12 DIAGNOSIS — M54.42 CHRONIC BILATERAL LOW BACK PAIN WITH BILATERAL SCIATICA: ICD-10-CM

## 2022-10-12 DIAGNOSIS — M54.2 CERVICALGIA: ICD-10-CM

## 2022-10-12 DIAGNOSIS — S22.080S COMPRESSION FRACTURE OF T12 VERTEBRA, SEQUELA: ICD-10-CM

## 2022-10-12 DIAGNOSIS — G89.4 CHRONIC PAIN SYNDROME: ICD-10-CM

## 2022-10-12 DIAGNOSIS — M62.830 BACK SPASM: ICD-10-CM

## 2022-10-12 DIAGNOSIS — M47.817 LUMBOSACRAL SPONDYLOSIS WITHOUT MYELOPATHY: ICD-10-CM

## 2022-10-12 DIAGNOSIS — M25.572 CHRONIC PAIN OF LEFT ANKLE: ICD-10-CM

## 2022-10-12 DIAGNOSIS — G89.29 CHRONIC PAIN OF LEFT ANKLE: ICD-10-CM

## 2022-10-12 DIAGNOSIS — E66.09 CLASS 1 OBESITY DUE TO EXCESS CALORIES IN ADULT, UNSPECIFIED BMI, UNSPECIFIED WHETHER SERIOUS COMORBIDITY PRESENT: ICD-10-CM

## 2022-10-12 DIAGNOSIS — M54.41 CHRONIC BILATERAL LOW BACK PAIN WITH BILATERAL SCIATICA: ICD-10-CM

## 2022-10-12 PROCEDURE — G8427 DOCREV CUR MEDS BY ELIG CLIN: HCPCS | Performed by: NURSE PRACTITIONER

## 2022-10-12 PROCEDURE — 20553 NJX 1/MLT TRIGGER POINTS 3/>: CPT | Performed by: NURSE PRACTITIONER

## 2022-10-12 PROCEDURE — G8484 FLU IMMUNIZE NO ADMIN: HCPCS | Performed by: NURSE PRACTITIONER

## 2022-10-12 PROCEDURE — 4004F PT TOBACCO SCREEN RCVD TLK: CPT | Performed by: NURSE PRACTITIONER

## 2022-10-12 PROCEDURE — 3017F COLORECTAL CA SCREEN DOC REV: CPT | Performed by: NURSE PRACTITIONER

## 2022-10-12 PROCEDURE — G8417 CALC BMI ABV UP PARAM F/U: HCPCS | Performed by: NURSE PRACTITIONER

## 2022-10-12 PROCEDURE — 99214 OFFICE O/P EST MOD 30 MIN: CPT | Performed by: NURSE PRACTITIONER

## 2022-10-12 RX ORDER — IBUPROFEN 800 MG/1
800 TABLET ORAL EVERY 8 HOURS PRN
Qty: 90 TABLET | Refills: 3 | Status: SHIPPED | OUTPATIENT
Start: 2022-10-12

## 2022-10-12 RX ORDER — HYDROCODONE BITARTRATE AND ACETAMINOPHEN 5; 325 MG/1; MG/1
1 TABLET ORAL EVERY 8 HOURS PRN
Qty: 90 TABLET | Refills: 0 | Status: SHIPPED | OUTPATIENT
Start: 2022-10-12 | End: 2022-11-11

## 2022-10-12 NOTE — PROGRESS NOTES
Heath Josephr III  1970  [id number]      HISTORY OF PRESENT ILLNESS: Mr. Rhoda Mills is a 46 y.o. male returns for a follow up visit for pain management  He has a diagnosis of   1. Chronic pain syndrome    2. Lumbosacral spondylosis without myelopathy    3. Chronic bilateral low back pain with bilateral sciatica    4. Compression fracture of T12 vertebra, sequela    5. Back spasm    6. Cervicalgia    7. Chronic pain of left ankle    8. Class 1 obesity due to excess calories in adult, unspecified BMI, unspecified whether serious comorbidity present      As per Information Obtained from the PADT (Patient Assessment and Documentation Tool)    He complains of pain in the both foot/feet: entire area, both leg(s): entire area, bilateral lower back, and bilateral upper back He rates the pain 9/10 and describes it as aching. Current treatment regimen has helped relieve about 20% of the pain. He denies any side effects from the current pain regimen. Patient reports that since the last follow up visit the physical functioning is unchanged, family/social relationships are unchanged, mood is unchanged sleep patterns are unchanged, and that the overall functioning is unchanged. Patient denies misusing/abusing his narcotic pain medications or using any illegal drugs. Upon obtaining medical history from Mr. Rhoda Mills states that pain is somewhat manageable on current pain therapy. Takes the pain medications as prescribed. Reports having tenderness to the lower back, mainly to the left side. He is willing to try the TPI of the Lumbar spine. Mood/anxiety is stable. Sleep is fair with an average of 5-6 hours. Denies to having issues of constipation. Tolerating activities/house chores with moderate tenderness to the lower back. Working smoking cessation    ALLERGIES: Patients list of allergies were reviewed     MEDICATIONS: Mr. Rhoda Mills list of medications were reviewed. His current medications are   Outpatient Medications Prior to Visit   Medication Sig Dispense Refill    naloxone (NARCAN) 4 MG/0.1ML LIQD nasal spray Use as directed 1 each 0    aspirin EC 81 MG EC tablet Take 1 tablet by mouth daily 30 tablet 5    Lidocaine 1.8 % PTCH Apply 1 patch topically daily 30 patch 0    HYDROcodone-acetaminophen (NORCO) 5-325 MG per tablet Take 1 tablet by mouth every 8 hours as needed for Pain for up to 30 days. Intended supply: 3 days. Take lowest dose possible to manage pain 90 tablet 0    ibuprofen (ADVIL;MOTRIN) 800 MG tablet Take 1 tablet by mouth every 8 hours as needed for Pain 90 tablet 3     No facility-administered medications prior to visit. SOCIAL/FAMILY/PAST MEDICAL HISTORY: Mr. Mirza Damon, family and past medical history was reviewed. REVIEW OF SYSTEMS:    Respiratory: Negative for apnea, chest tightness and shortness of breath or change in baseline breathing. Gastrointestinal: Negative for nausea, vomiting, abdominal pain, diarrhea, constipation, blood in stool and abdominal distention. PHYSICAL EXAM:   Nursing note and vitals reviewed. BP (!) 146/89   Pulse 97   Temp 97.3 °F (36.3 °C)   Ht 5' 11\" (1.803 m)   SpO2 94%   BMI 30.68 kg/m²   Constitutional: He appears well-developed and well-nourished. No acute distress. Skin: Skin is warm and dry, good turgor. No rash noted. He is not diaphoretic. Cardiovascular: Normal rate, regular rhythm, normal heart sounds, and does not have murmur. Pulmonary/Chest: Effort normal. No respiratory distress. He does not have wheezes in the lung fields. He has no rales. Neurological/Psychiatric:He is alert and oriented to person, place, and time. + Lumbar spine pain Coordination is  normal.  His mood isAppropriate and affect is Neutral/Euthymic(normal) .      Prescription pain medication monitoring:                  MEDD current = 15              ORT Score = 0 low risk              Other Risk factors - (mood) Stable              Date of Last Medication Agreement: 3/3/22              Date Naloxone prescribed: 3/4/22              UDT:                          Date of last UDT: 6/22/22                          Adverse report: No              OARRS:                          Checked today: Yes                          Adverse report: No      IMPRESSION:   1. Chronic pain syndrome    2. Lumbosacral spondylosis without myelopathy    3. Chronic bilateral low back pain with bilateral sciatica    4. Compression fracture of T12 vertebra, sequela    5. Back spasm    6. Cervicalgia    7. Chronic pain of left ankle    8. Class 1 obesity due to excess calories in adult, unspecified BMI, unspecified whether serious comorbidity present        PLAN:  Informed verbal consent was obtained:  - Patient's opioid therapy will be maintained at current dose  -Home exercises/Sobeida exercises recommended  -Educations provided on TPI of the Lumbar spine, side effects reviewed including a temporary elevation in blood sugars, advised to monitor closely. She verbalized understanding, and voiced interest   -CBT techniques- relaxation therapies such as biofeedback, mindfulness based stress reduction, imagery, cognitive restructuring, problem solving discussed with patient   -He was advised weight reduction, diet changes- 800-1200 jen diet, diet diary, exercising, nutritional  consult increased physical activity as tolerated   -Last UDS 6/22/22 consistent  -Return in about 4 weeks (around 11/9/2022). Informed verbal consent was obtained from the patient. Risks and benefits of the procedure were explained. Complications of the procedure and side effects of kenalog/ lidocaine were discussed with patient. Using 0.25 % marcaine and 1cc of kenelog the tender trigger point areas were injected under aseptic condition and mobilizations attempted by stretching . Patient tolerated procedure well.       Analgesic Plan:              Continue present regimen: Norco 5-325 mg tabs q8h prn Adjust dose of present analgesic: No              Switch analgesics: No              Add/Adjust concomitant therapy: Lidocaine, Motrin, Narcan    I will continue his current medication regimen  which is part of the above treatment schedule. It has been helping with Mr. Lay Rodriguez chronic  medical problems which for this visit include:   Diagnoses of Chronic pain syndrome, Lumbosacral spondylosis without myelopathy, Chronic bilateral low back pain with bilateral sciatica, Compression fracture of T12 vertebra, sequela, Back spasm, Cervicalgia, Chronic pain of left ankle, and Class 1 obesity due to excess calories in adult, unspecified BMI, unspecified whether serious comorbidity present were pertinent to this visit. Risks and benefits of the medications and other alternative treatments  including no treatment were discussed with the patient. The common side effects of these medications were also explained to the patient. Informed verbal consent was obtained. Goals of current treatment regimen include improvement in pain, restoration of functioning- with focus on improvement in physical performance, general activity, work or disability,emotional distress, health care utilization and  decreased medication consumption. Will continue to monitor progress towards achieving/maintaining therapeutic goals with special emphasis on  1. Improvement in perceived interfernce  of pain with ADL's. Ability to do home exercises independently. Ability to do household chores indoor and/or outdoor work and social and leisure activities. Improve psychosocial and physical functioning. - he is not showing any significant progress/or showing regression  towards this goal and reassessment and adjustment of goals/treatment have been made.      He was advised against drinking alcohol with the narcotic pain medicines, advised against driving or handling machinery while adjusting the dose of medicines or if having cognitive  issues related to the current medications. Risk of overdose and death, if medicines not taken as prescribed, were also discussed. If the patient develops new symptoms or if the symptoms worsen, the patient should call the office. While transcribing every attempt was made to maintain the accuracy of the note in terms of it's contents,there may have been some errors made inadvertently. Thank you for allowing me to participate in the care of this patient. Martinez Mistry CNP.     Cc: Lizy Landis MD

## 2022-10-17 RX ORDER — TRIAMCINOLONE ACETONIDE 40 MG/ML
40 INJECTION, SUSPENSION INTRA-ARTICULAR; INTRAMUSCULAR ONCE
Status: SHIPPED | OUTPATIENT
Start: 2022-10-12

## 2022-11-09 ENCOUNTER — OFFICE VISIT (OUTPATIENT)
Dept: PAIN MANAGEMENT | Age: 52
End: 2022-11-09
Payer: COMMERCIAL

## 2022-11-09 VITALS
TEMPERATURE: 97.8 F | DIASTOLIC BLOOD PRESSURE: 96 MMHG | OXYGEN SATURATION: 97 % | SYSTOLIC BLOOD PRESSURE: 140 MMHG | HEIGHT: 71 IN | HEART RATE: 97 BPM | BODY MASS INDEX: 30.68 KG/M2

## 2022-11-09 DIAGNOSIS — M54.41 CHRONIC BILATERAL LOW BACK PAIN WITH BILATERAL SCIATICA: ICD-10-CM

## 2022-11-09 DIAGNOSIS — M62.830 BACK SPASM: ICD-10-CM

## 2022-11-09 DIAGNOSIS — E66.09 CLASS 1 OBESITY DUE TO EXCESS CALORIES WITH SERIOUS COMORBIDITY IN ADULT, UNSPECIFIED BMI: ICD-10-CM

## 2022-11-09 DIAGNOSIS — G89.29 CHRONIC BILATERAL LOW BACK PAIN WITH BILATERAL SCIATICA: ICD-10-CM

## 2022-11-09 DIAGNOSIS — G89.29 CHRONIC PAIN OF LEFT ANKLE: ICD-10-CM

## 2022-11-09 DIAGNOSIS — M54.2 CERVICALGIA: ICD-10-CM

## 2022-11-09 DIAGNOSIS — S22.080S COMPRESSION FRACTURE OF T12 VERTEBRA, SEQUELA: ICD-10-CM

## 2022-11-09 DIAGNOSIS — M47.817 LUMBOSACRAL SPONDYLOSIS WITHOUT MYELOPATHY: ICD-10-CM

## 2022-11-09 DIAGNOSIS — M54.42 CHRONIC BILATERAL LOW BACK PAIN WITH BILATERAL SCIATICA: ICD-10-CM

## 2022-11-09 DIAGNOSIS — G89.4 CHRONIC PAIN SYNDROME: ICD-10-CM

## 2022-11-09 DIAGNOSIS — M25.572 CHRONIC PAIN OF LEFT ANKLE: ICD-10-CM

## 2022-11-09 PROCEDURE — 4004F PT TOBACCO SCREEN RCVD TLK: CPT | Performed by: NURSE PRACTITIONER

## 2022-11-09 PROCEDURE — G8427 DOCREV CUR MEDS BY ELIG CLIN: HCPCS | Performed by: NURSE PRACTITIONER

## 2022-11-09 PROCEDURE — 3017F COLORECTAL CA SCREEN DOC REV: CPT | Performed by: NURSE PRACTITIONER

## 2022-11-09 PROCEDURE — 99213 OFFICE O/P EST LOW 20 MIN: CPT | Performed by: NURSE PRACTITIONER

## 2022-11-09 PROCEDURE — G8417 CALC BMI ABV UP PARAM F/U: HCPCS | Performed by: NURSE PRACTITIONER

## 2022-11-09 PROCEDURE — G8484 FLU IMMUNIZE NO ADMIN: HCPCS | Performed by: NURSE PRACTITIONER

## 2022-11-09 RX ORDER — IBUPROFEN 800 MG/1
800 TABLET ORAL EVERY 8 HOURS PRN
Qty: 90 TABLET | Refills: 3 | Status: SHIPPED | OUTPATIENT
Start: 2022-11-09

## 2022-11-09 RX ORDER — METHYLPREDNISOLONE 4 MG/1
TABLET ORAL
Qty: 1 KIT | Refills: 0 | Status: SHIPPED | OUTPATIENT
Start: 2022-11-09 | End: 2022-11-15

## 2022-11-09 RX ORDER — HYDROCODONE BITARTRATE AND ACETAMINOPHEN 5; 325 MG/1; MG/1
1 TABLET ORAL EVERY 8 HOURS PRN
Qty: 90 TABLET | Refills: 0 | Status: SHIPPED | OUTPATIENT
Start: 2022-11-09 | End: 2022-12-09

## 2022-11-09 NOTE — PROGRESS NOTES
Anaya Fang [facility]  1970  [id number]      HISTORY OF PRESENT ILLNESS: Mr. Dusty Nelson is a 46 y.o. male returns for a follow up visit for pain management  He has a diagnosis of   1. Chronic pain syndrome    2. Compression fracture of T12 vertebra, sequela    3. Lumbosacral spondylosis without myelopathy    4. Chronic bilateral low back pain with bilateral sciatica    5. Back spasm    6. Cervicalgia    7. Chronic pain of left ankle    8. Class 1 obesity due to excess calories with serious comorbidity in adult, unspecified BMI      As per Information Obtained from the PADT (Patient Assessment and Documentation Tool)    He complains of pain in the both foot/feet: entire area, both leg(s): entire area, bilateral lower back, bilateral mid-back, and bilateral upper back He rates the pain 9/10 and describes it as aching. Current treatment regimen has helped relieve about 20% of the pain. He denies any side effects from the current pain regimen. Patient reports that since the last follow up visit the physical functioning is unchanged, family/social relationships are worse, mood is unchanged sleep patterns are unchanged, and that the overall functioning is unchanged. Patient denies misusing/abusing his narcotic pain medications or using any illegal drugs. Upon obtaining medical history from Mr. Dusty Nelson states that pain is manageable on current pain therapy. Takes the pain medications as prescribed. Mood/anxiety is stable. Sleep is fair with an average of 5-6 hours. Denies to having issues of constipation. Tolerating activities/house chores with moderate tenderness to the lower back. Smokes 1/2 a pack of cigarettes a day    ALLERGIES: Patients list of allergies were reviewed     MEDICATIONS: Mr. Dusty Nelson list of medications were reviewed. His current medications are   Outpatient Medications Prior to Visit   Medication Sig Dispense Refill    naloxone (NARCAN) 4 MG/0.1ML LIQD nasal spray Use as directed 1 each 0    aspirin EC 81 MG EC tablet Take 1 tablet by mouth daily 30 tablet 5    Lidocaine 1.8 % PTCH Apply 1 patch topically daily 30 patch 0    HYDROcodone-acetaminophen (NORCO) 5-325 MG per tablet Take 1 tablet by mouth every 8 hours as needed for Pain for up to 30 days. Intended supply: 3 days. Take lowest dose possible to manage pain 90 tablet 0    ibuprofen (ADVIL;MOTRIN) 800 MG tablet Take 1 tablet by mouth every 8 hours as needed for Pain 90 tablet 3     Facility-Administered Medications Prior to Visit   Medication Dose Route Frequency Provider Last Rate Last Admin    triamcinolone acetonide (KENALOG-40) injection 40 mg  40 mg IntraMUSCular Once Mg Aranda, APRN - CNP           SOCIAL/FAMILY/PAST MEDICAL HISTORY: Mr. Javed Arcos, family and past medical history was reviewed. REVIEW OF SYSTEMS:    Respiratory: Negative for apnea, chest tightness and shortness of breath or change in baseline breathing. Gastrointestinal: Negative for nausea, vomiting, abdominal pain, diarrhea, constipation, blood in stool and abdominal distention. PHYSICAL EXAM:   Nursing note and vitals reviewed. BP (!) 140/96   Pulse 97   Temp 97.8 °F (36.6 °C)   Ht 5' 11\" (1.803 m)   SpO2 97%   BMI 30.68 kg/m²   Constitutional: He appears well-developed and well-nourished. No acute distress. Skin: Skin is warm and dry, good turgor. No rash noted. He is not diaphoretic. Cardiovascular: Normal rate, regular rhythm, normal heart sounds, and does not have murmur. Pulmonary/Chest: Effort normal. No respiratory distress. He does not have wheezes in the lung fields. He has no rales. Neurological/Psychiatric:He is alert and oriented to person, place, and time. Coordination is  normal.  His mood isAppropriate and affect is Neutral/Euthymic(normal) .      Prescription pain medication monitoring:                  MEDD current = 15              ORT Score = 0 low risk              Other Risk factors - (mood) Stable Date of Last Medication Agreement: 3/3/22              Date Naloxone prescribed: 3/4/22              UDT:                          Date of last UDT: 6/22/22                          Adverse report: No              OARRS:                          Checked today: Yes                          Adverse report: No    IMPRESSION:   1. Chronic pain syndrome    2. Compression fracture of T12 vertebra, sequela    3. Lumbosacral spondylosis without myelopathy    4. Chronic bilateral low back pain with bilateral sciatica    5. Back spasm    6. Cervicalgia    7. Chronic pain of left ankle    8. Class 1 obesity due to excess calories with serious comorbidity in adult, unspecified BMI        PLAN:  Informed verbal consent was obtained:  -Patient's opioid therapy will be maintained at current dose  -Medrol dose pack  -Home exercises/Sobeida exercises recommended  -CBT techniques- relaxation therapies such as biofeedback, mindfulness based stress reduction, imagery, cognitive restructuring, problem solving discussed with patient   -He was advised weight reduction, diet changes- 800-1200 jen diet, diet diary, exercising, nutritional  consult increased physical activity as tolerated   -Advised patient to quit smoking for  health related concerns and to improve the treatment outcomes. Education was given on quitting smoking and the use of different modalities including medications, hypnotherapy, counselling  and biofeedback. These were discussed with patient.   -Last UDS 6/22/22 consistent  -Return in about 4 weeks (around 12/7/2022). Analgesic Plan:              Continue present regimen: Norco 5-325 mg tabs q8h prn              Adjust dose of present analgesic: No              Switch analgesics: No              Add/Adjust concomitant therapy: Lidocaine, Motrin, Narcan    I will continue his current medication regimen  which is part of the above treatment schedule.  It has been helping with Mr. Wise Frankie problems which for this visit include:   Diagnoses of Chronic pain syndrome, Compression fracture of T12 vertebra, sequela, Lumbosacral spondylosis without myelopathy, Chronic bilateral low back pain with bilateral sciatica, Back spasm, Cervicalgia, Chronic pain of left ankle, and Class 1 obesity due to excess calories with serious comorbidity in adult, unspecified BMI were pertinent to this visit. Risks and benefits of the medications and other alternative treatments  including no treatment were discussed with the patient. The common side effects of these medications were also explained to the patient. Informed verbal consent was obtained. Goals of current treatment regimen include improvement in pain, restoration of functioning- with focus on improvement in physical performance, general activity, work or disability,emotional distress, health care utilization and  decreased medication consumption. Will continue to monitor progress towards achieving/maintaining therapeutic goals with special emphasis on  1. Improvement in perceived interfernce  of pain with ADL's. Ability to do home exercises independently. Ability to do household chores indoor and/or outdoor work and social and leisure activities. Improve psychosocial and physical functioning. - he is showing progression towards this treatment goal with the current regimen. He was advised against drinking alcohol with the narcotic pain medicines, advised against driving or handling machinery while adjusting the dose of medicines or if having cognitive  issues related to the current medications. Risk of overdose and death, if medicines not taken as prescribed, were also discussed. If the patient develops new symptoms or if the symptoms worsen, the patient should call the office. While transcribing every attempt was made to maintain the accuracy of the note in terms of it's contents,there may have been some errors made inadvertently.   Thank you for allowing me to participate in the care of this patient. [facility].     Cc: Mohan Martinez MD

## 2022-12-07 ENCOUNTER — OFFICE VISIT (OUTPATIENT)
Dept: PAIN MANAGEMENT | Age: 52
End: 2022-12-07

## 2022-12-07 VITALS
DIASTOLIC BLOOD PRESSURE: 85 MMHG | HEART RATE: 101 BPM | BODY MASS INDEX: 31.08 KG/M2 | SYSTOLIC BLOOD PRESSURE: 134 MMHG | OXYGEN SATURATION: 97 % | TEMPERATURE: 97.2 F | HEIGHT: 71 IN | WEIGHT: 222 LBS

## 2022-12-07 DIAGNOSIS — G89.29 CHRONIC PAIN OF LEFT ANKLE: ICD-10-CM

## 2022-12-07 DIAGNOSIS — S39.012A STRAIN OF LUMBAR REGION, INITIAL ENCOUNTER: ICD-10-CM

## 2022-12-07 DIAGNOSIS — M47.817 LUMBOSACRAL SPONDYLOSIS WITHOUT MYELOPATHY: ICD-10-CM

## 2022-12-07 DIAGNOSIS — M25.572 CHRONIC PAIN OF LEFT ANKLE: ICD-10-CM

## 2022-12-07 DIAGNOSIS — M62.830 BACK SPASM: ICD-10-CM

## 2022-12-07 DIAGNOSIS — M54.2 CERVICALGIA: ICD-10-CM

## 2022-12-07 DIAGNOSIS — M54.42 CHRONIC BILATERAL LOW BACK PAIN WITH BILATERAL SCIATICA: ICD-10-CM

## 2022-12-07 DIAGNOSIS — M54.41 CHRONIC BILATERAL LOW BACK PAIN WITH BILATERAL SCIATICA: ICD-10-CM

## 2022-12-07 DIAGNOSIS — G89.4 CHRONIC PAIN SYNDROME: ICD-10-CM

## 2022-12-07 DIAGNOSIS — E66.09 OBESITY DUE TO EXCESS CALORIES WITH SERIOUS COMORBIDITY, UNSPECIFIED CLASSIFICATION: ICD-10-CM

## 2022-12-07 DIAGNOSIS — G89.29 CHRONIC BILATERAL LOW BACK PAIN WITH BILATERAL SCIATICA: ICD-10-CM

## 2022-12-07 DIAGNOSIS — S22.080S COMPRESSION FRACTURE OF T12 VERTEBRA, SEQUELA: ICD-10-CM

## 2022-12-07 RX ORDER — TRIAMCINOLONE ACETONIDE 40 MG/ML
40 INJECTION, SUSPENSION INTRA-ARTICULAR; INTRAMUSCULAR ONCE
Status: COMPLETED | OUTPATIENT
Start: 2022-12-07 | End: 2022-12-07

## 2022-12-07 RX ORDER — IBUPROFEN 800 MG/1
800 TABLET ORAL EVERY 8 HOURS PRN
Qty: 90 TABLET | Refills: 3 | Status: SHIPPED | OUTPATIENT
Start: 2022-12-07

## 2022-12-07 RX ORDER — HYDROCODONE BITARTRATE AND ACETAMINOPHEN 5; 325 MG/1; MG/1
1 TABLET ORAL EVERY 8 HOURS PRN
Qty: 90 TABLET | Refills: 0 | Status: SHIPPED | OUTPATIENT
Start: 2022-12-07 | End: 2023-01-06

## 2022-12-07 RX ADMIN — TRIAMCINOLONE ACETONIDE 40 MG: 40 INJECTION, SUSPENSION INTRA-ARTICULAR; INTRAMUSCULAR at 14:36

## 2022-12-07 NOTE — PROGRESS NOTES
Jennifer [facility]  1970  [id number]      HISTORY OF PRESENT ILLNESS: Mr. Ar Montesinos is a 46 y.o. male returns for a follow up visit for pain management  He has a diagnosis of   1. Chronic pain syndrome    2. Strain of lumbar region, initial encounter    3. Lumbosacral spondylosis without myelopathy    4. Chronic bilateral low back pain with bilateral sciatica    5. Compression fracture of T12 vertebra, sequela    6. Back spasm    7. Cervicalgia    8. Chronic pain of left ankle    9. Obesity due to excess calories with serious comorbidity, unspecified classification      As per Information Obtained from the PADT (Patient Assessment and Documentation Tool)    He complains of pain in the both foot/feet: entire area, both leg(s): entire area, mid lower back, mid mid-back, and neck He rates the pain 9/10 and describes it as aching. Current treatment regimen has helped relieve about 20% of the pain. He denies any side effects from the current pain regimen. Patient reports that since the last follow up visit the physical functioning is worse, family/social relationships are unchanged, mood is unchanged sleep patterns are worse, and that the overall functioning is worse. Patient denies misusing/abusing his narcotic pain medications or using any illegal drugs. Upon obtaining medical history from Mr. Ar Montesinos states that pain is somewhat manageable on current pain therapy. Takes the pain medications as prescribed. Reports having rolled out of bed causing strain to the lower back, requesting for TPI of the Lumbar spine. Mood/anxiety is stable. Sleep is fair with an average of 5-6 hours. Denies to having issues of constipation. Tolerating activities/house chores with moderate tenderness to the lower back. Working on smoking cessation    ALLERGIES: Patients list of allergies were reviewed     MEDICATIONS: Mr. Ar Montesinos list of medications were reviewed. His current medications are   Outpatient Medications Prior to Visit   Medication Sig Dispense Refill    naloxone (NARCAN) 4 MG/0.1ML LIQD nasal spray Use as directed 1 each 0    aspirin EC 81 MG EC tablet Take 1 tablet by mouth daily 30 tablet 5    Lidocaine 1.8 % PTCH Apply 1 patch topically daily 30 patch 0    HYDROcodone-acetaminophen (NORCO) 5-325 MG per tablet Take 1 tablet by mouth every 8 hours as needed for Pain for up to 30 days. Intended supply: 3 days. Take lowest dose possible to manage pain 90 tablet 0    ibuprofen (ADVIL;MOTRIN) 800 MG tablet Take 1 tablet by mouth every 8 hours as needed for Pain 90 tablet 3     Facility-Administered Medications Prior to Visit   Medication Dose Route Frequency Provider Last Rate Last Admin    triamcinolone acetonide (KENALOG-40) injection 40 mg  40 mg IntraMUSCular Once Viki Spurling, APRN - FUNMI           SOCIAL/FAMILY/PAST MEDICAL HISTORY: Mr. Feliberto Pleitez, family and past medical history was reviewed. REVIEW OF SYSTEMS:    Respiratory: Negative for apnea, chest tightness and shortness of breath or change in baseline breathing. Gastrointestinal: Negative for nausea, vomiting, abdominal pain, diarrhea, constipation, blood in stool and abdominal distention. PHYSICAL EXAM:   Nursing note and vitals reviewed. /85   Pulse (!) 101   Temp 97.2 °F (36.2 °C)   Ht 5' 11\" (1.803 m)   Wt 222 lb (100.7 kg)   SpO2 97%   BMI 30.96 kg/m²   Constitutional: He appears well-developed and well-nourished. No acute distress. Skin: Skin is warm and dry, good turgor. No rash noted. He is not diaphoretic. Cardiovascular: Normal rate, regular rhythm, normal heart sounds, and does not have murmur. Pulmonary/Chest: Effort normal. No respiratory distress. He does not have wheezes in the lung fields. He has no rales. Neurological/Psychiatric:He is alert and oriented to person, place, and time.  Coordination is  normal. =Lumbar pain with palpation, 30 degree flexion, negative for swelling or obvious defomrity  His mood isAppropriate and affect is Neutral/Euthymic(normal) . Prescription pain medication monitoring:                  MEDD current = 15              ORT Score = 0 low risk              Other Risk factors - (mood) Stable              Date of Last Medication Agreement: 3/3/22              Date Naloxone prescribed: 3/4/22              UDT:                          Date of last UDT: 6/22/22                          Adverse report: No              OARRS:                          Checked today: Yes                          Adverse report: No    IMPRESSION:   1. Chronic pain syndrome    2. Strain of lumbar region, initial encounter    3. Lumbosacral spondylosis without myelopathy    4. Chronic bilateral low back pain with bilateral sciatica    5. Compression fracture of T12 vertebra, sequela    6. Back spasm    7. Cervicalgia    8. Chronic pain of left ankle    9. Obesity due to excess calories with serious comorbidity, unspecified classification        PLAN:  Informed verbal consent was obtained:  -Patient's opioid therapy will be maintained at current dose  -Home exercises/Sobeida exercises recommended  -Educations provided on TPI of the Lumbar spine, side effects reviewed including a temporary elevation in blood sugars, advised to monitor closely. She verbalized understanding, and voiced interest   -CBT techniques- relaxation therapies such as biofeedback, mindfulness based stress reduction, imagery, cognitive restructuring, problem solving discussed with patient   -He was advised weight reduction, diet changes- 800-1200 jen diet, diet diary, exercising, nutritional  consult increased physical activity as tolerated   -Advised patient to quit smoking for  health related concerns and to improve the treatment outcomes. Education was given on quitting smoking and the use of different modalities including medications, hypnotherapy, counselling  and biofeedback.  These were discussed with patient.   -Last UDS 6/22/22 consistent  -Return in about 4 weeks (around 1/4/2023). -Informed verbal consent was obtained from the patient. Risks and benefits of the procedure were explained. Complications of the procedure and side effects of kenalog/ lidocaine were discussed with patient. Using 0.25% marcaine and 1cc of kenalog, the the tender trigger point areas in the  bilateral paraspinal lumbar muscles -erector spinae and longgissmus muscles were injected under aseptic condition. Mobilization attempted by stretching. Patient tolerated procedure well. Adv to apply ice today. Analgesic Plan:              Continue present regimen: Norco 5-325 mg tabs q8h prn              Adjust dose of present analgesic: No              Switch analgesics: No              Add/Adjust concomitant therapy: Lidocaine, Motrin, Narcan    I will continue his current medication regimen  which is part of the above treatment schedule. It has been helping with Mr. Corina Modi chronic  medical problems which for this visit include:   Diagnoses of Chronic pain syndrome, Strain of lumbar region, initial encounter, Lumbosacral spondylosis without myelopathy, Chronic bilateral low back pain with bilateral sciatica, Compression fracture of T12 vertebra, sequela, Back spasm, Cervicalgia, Chronic pain of left ankle, and Obesity due to excess calories with serious comorbidity, unspecified classification were pertinent to this visit. Risks and benefits of the medications and other alternative treatments  including no treatment were discussed with the patient. The common side effects of these medications were also explained to the patient. Informed verbal consent was obtained. Goals of current treatment regimen include improvement in pain, restoration of functioning- with focus on improvement in physical performance, general activity, work or disability,emotional distress, health care utilization and  decreased medication consumption.  Will continue to monitor progress towards achieving/maintaining therapeutic goals with special emphasis on  1. Improvement in perceived interfernce  of pain with ADL's. Ability to do home exercises independently. Ability to do household chores indoor and/or outdoor work and social and leisure activities. Improve psychosocial and physical functioning. - he is not showing any significant progress/or showing regression  towards this goal and reassessment and adjustment of goals/treatment have been made. He was advised against drinking alcohol with the narcotic pain medicines, advised against driving or handling machinery while adjusting the dose of medicines or if having cognitive  issues related to the current medications. Risk of overdose and death, if medicines not taken as prescribed, were also discussed. If the patient develops new symptoms or if the symptoms worsen, the patient should call the office. While transcribing every attempt was made to maintain the accuracy of the note in terms of it's contents,there may have been some errors made inadvertently. Thank you for allowing me to participate in the care of this patient. Jasper Clark CNP.     Cc: Mohan Martinez MD

## 2023-01-05 ENCOUNTER — TELEPHONE (OUTPATIENT)
Dept: PAIN MANAGEMENT | Age: 53
End: 2023-01-05

## 2023-01-05 DIAGNOSIS — M62.830 BACK SPASM: ICD-10-CM

## 2023-01-05 DIAGNOSIS — M54.42 CHRONIC BILATERAL LOW BACK PAIN WITH BILATERAL SCIATICA: ICD-10-CM

## 2023-01-05 DIAGNOSIS — M54.2 CERVICALGIA: ICD-10-CM

## 2023-01-05 DIAGNOSIS — G89.4 CHRONIC PAIN SYNDROME: ICD-10-CM

## 2023-01-05 DIAGNOSIS — G89.29 CHRONIC PAIN OF LEFT ANKLE: ICD-10-CM

## 2023-01-05 DIAGNOSIS — M47.817 LUMBOSACRAL SPONDYLOSIS WITHOUT MYELOPATHY: ICD-10-CM

## 2023-01-05 DIAGNOSIS — S22.080S COMPRESSION FRACTURE OF T12 VERTEBRA, SEQUELA: ICD-10-CM

## 2023-01-05 DIAGNOSIS — M25.572 CHRONIC PAIN OF LEFT ANKLE: ICD-10-CM

## 2023-01-05 DIAGNOSIS — M54.41 CHRONIC BILATERAL LOW BACK PAIN WITH BILATERAL SCIATICA: ICD-10-CM

## 2023-01-05 DIAGNOSIS — G89.29 CHRONIC BILATERAL LOW BACK PAIN WITH BILATERAL SCIATICA: ICD-10-CM

## 2023-01-05 RX ORDER — IBUPROFEN 800 MG/1
800 TABLET ORAL EVERY 8 HOURS PRN
Qty: 15 TABLET | Refills: 0 | Status: SHIPPED | OUTPATIENT
Start: 2023-01-05 | End: 2023-01-10 | Stop reason: SDUPTHER

## 2023-01-05 RX ORDER — HYDROCODONE BITARTRATE AND ACETAMINOPHEN 5; 325 MG/1; MG/1
1 TABLET ORAL EVERY 8 HOURS PRN
Qty: 15 TABLET | Refills: 0 | Status: SHIPPED | OUTPATIENT
Start: 2023-01-05 | End: 2023-01-10 | Stop reason: SDUPTHER

## 2023-01-05 NOTE — TELEPHONE ENCOUNTER
Message said VM not set up yet.     Wanted to let pt know that [id number] sent the meds to the pharmacy

## 2023-01-05 NOTE — TELEPHONE ENCOUNTER
Patient's apt had to be moved because of pats schedule and is out of his medication today and isnt seen until 1-.     Needs an extension his NORCO, Ibuprofen, and Lidocaine patches    Please advise

## 2023-01-10 ENCOUNTER — OFFICE VISIT (OUTPATIENT)
Dept: PAIN MANAGEMENT | Age: 53
End: 2023-01-10
Payer: COMMERCIAL

## 2023-01-10 VITALS
TEMPERATURE: 97.9 F | OXYGEN SATURATION: 97 % | HEART RATE: 104 BPM | DIASTOLIC BLOOD PRESSURE: 112 MMHG | BODY MASS INDEX: 31.22 KG/M2 | WEIGHT: 223 LBS | SYSTOLIC BLOOD PRESSURE: 163 MMHG | HEIGHT: 71 IN

## 2023-01-10 DIAGNOSIS — G89.29 CHRONIC BILATERAL LOW BACK PAIN WITH BILATERAL SCIATICA: ICD-10-CM

## 2023-01-10 DIAGNOSIS — S22.080S COMPRESSION FRACTURE OF T12 VERTEBRA, SEQUELA: ICD-10-CM

## 2023-01-10 DIAGNOSIS — M47.817 LUMBOSACRAL SPONDYLOSIS WITHOUT MYELOPATHY: ICD-10-CM

## 2023-01-10 DIAGNOSIS — M54.42 CHRONIC BILATERAL LOW BACK PAIN WITH BILATERAL SCIATICA: ICD-10-CM

## 2023-01-10 DIAGNOSIS — G89.29 CHRONIC PAIN OF LEFT ANKLE: ICD-10-CM

## 2023-01-10 DIAGNOSIS — G89.4 CHRONIC PAIN SYNDROME: ICD-10-CM

## 2023-01-10 DIAGNOSIS — M25.572 CHRONIC PAIN OF LEFT ANKLE: ICD-10-CM

## 2023-01-10 DIAGNOSIS — E66.09 CLASS 1 OBESITY DUE TO EXCESS CALORIES WITH SERIOUS COMORBIDITY IN ADULT, UNSPECIFIED BMI: ICD-10-CM

## 2023-01-10 DIAGNOSIS — M54.2 CERVICALGIA: ICD-10-CM

## 2023-01-10 DIAGNOSIS — M62.830 BACK SPASM: ICD-10-CM

## 2023-01-10 DIAGNOSIS — M54.41 CHRONIC BILATERAL LOW BACK PAIN WITH BILATERAL SCIATICA: ICD-10-CM

## 2023-01-10 PROCEDURE — 3017F COLORECTAL CA SCREEN DOC REV: CPT | Performed by: NURSE PRACTITIONER

## 2023-01-10 PROCEDURE — 99214 OFFICE O/P EST MOD 30 MIN: CPT | Performed by: NURSE PRACTITIONER

## 2023-01-10 PROCEDURE — 4004F PT TOBACCO SCREEN RCVD TLK: CPT | Performed by: NURSE PRACTITIONER

## 2023-01-10 PROCEDURE — G8427 DOCREV CUR MEDS BY ELIG CLIN: HCPCS | Performed by: NURSE PRACTITIONER

## 2023-01-10 PROCEDURE — G8484 FLU IMMUNIZE NO ADMIN: HCPCS | Performed by: NURSE PRACTITIONER

## 2023-01-10 PROCEDURE — G8417 CALC BMI ABV UP PARAM F/U: HCPCS | Performed by: NURSE PRACTITIONER

## 2023-01-10 RX ORDER — METHYLPREDNISOLONE 4 MG/1
TABLET ORAL
Qty: 1 KIT | Refills: 0 | Status: SHIPPED | OUTPATIENT
Start: 2023-01-10 | End: 2023-01-16

## 2023-01-10 RX ORDER — IBUPROFEN 800 MG/1
800 TABLET ORAL EVERY 8 HOURS PRN
Qty: 15 TABLET | Refills: 0 | Status: SHIPPED | OUTPATIENT
Start: 2023-01-10 | End: 2023-01-15

## 2023-01-10 RX ORDER — HYDROCODONE BITARTRATE AND ACETAMINOPHEN 5; 325 MG/1; MG/1
1 TABLET ORAL EVERY 8 HOURS PRN
Qty: 90 TABLET | Refills: 0 | Status: SHIPPED | OUTPATIENT
Start: 2023-01-10 | End: 2023-02-09

## 2023-01-10 NOTE — PROGRESS NOTES
Minesh [place] III  1970  [id number]      HISTORY OF PRESENT ILLNESS: Mr. Ten Martin is a 46 y.o. male returns for a follow up visit for pain management  He has a diagnosis of   1. Chronic pain syndrome    2. Lumbosacral spondylosis without myelopathy    3. Chronic bilateral low back pain with bilateral sciatica    4. Compression fracture of T12 vertebra, sequela    5. Back spasm    6. Cervicalgia    7. Chronic pain of left ankle    8. Class 1 obesity due to excess calories with serious comorbidity in adult, unspecified BMI      As per Information Obtained from the PADT (Patient Assessment and Documentation Tool)    He complains of pain in the bilateral lower back, bilateral mid-back, and bilateral upper back  both legsHe rates the pain 9/10 and describes it as aching. Current treatment regimen has helped relieve about 90% of the pain. He denies any side effects from the current pain regimen. Patient reports that since the last follow up visit the physical functioning is unchanged, family/social relationships are unchanged, mood is unchanged sleep patterns are unchanged, and that the overall functioning is unchanged. Patient denies misusing/abusing his narcotic pain medications or using any illegal drugs. Upon obtaining medical history from Mr. Ten Martin states that pain is manageable on current pain therapy. Takes the pain medications as prescribed. Requesting for TPI of the Lumbar spine, which seems to help the sciatic pain. Mood/anxiety is stable. Sleep is fair with an average of 5-6 hours. Denies to having issues of constipation. Tolerating activities/house chores with moderate tenderness to the lower back. Working on smoking cessation    ALLERGIES: Patients list of allergies were reviewed     MEDICATIONS: Mr. Tne Martin list of medications were reviewed. His current medications are   Outpatient Medications Prior to Visit   Medication Sig Dispense Refill    naloxone (NARCAN) 4 MG/0.1ML LIQD nasal spray Use as directed 1 each 0    aspirin EC 81 MG EC tablet Take 1 tablet by mouth daily 30 tablet 5    Lidocaine 1.8 % PTCH Apply 1 patch topically daily 30 patch 0    HYDROcodone-acetaminophen (NORCO) 5-325 MG per tablet Take 1 tablet by mouth every 8 hours as needed for Pain for up to 5 days. Intended supply: 3 days. Take lowest dose possible to manage pain Max Daily Amount: 3 tablets 15 tablet 0    ibuprofen (ADVIL;MOTRIN) 800 MG tablet Take 1 tablet by mouth every 8 hours as needed for Pain 15 tablet 0     Facility-Administered Medications Prior to Visit   Medication Dose Route Frequency Provider Last Rate Last Admin    triamcinolone acetonide (KENALOG-40) injection 40 mg  40 mg IntraMUSCular Once Real Rein, APRN - CNP           SOCIAL/FAMILY/PAST MEDICAL HISTORY: Mr. Coker Mantle, family and past medical history was reviewed. REVIEW OF SYSTEMS:    Respiratory: Negative for apnea, chest tightness and shortness of breath or change in baseline breathing. Gastrointestinal: Negative for nausea, vomiting, abdominal pain, diarrhea, constipation, blood in stool and abdominal distention. PHYSICAL EXAM:   Nursing note and vitals reviewed. BP (!) 163/112   Pulse (!) 104   Temp 97.9 °F (36.6 °C)   Ht 5' 11\" (1.803 m)   Wt 223 lb (101.2 kg)   SpO2 97%   BMI 31.10 kg/m²   Constitutional: He appears well-developed and well-nourished. No acute distress. Skin: Skin is warm and dry, good turgor. No rash noted. He is not diaphoretic. Cardiovascular: Normal rate, regular rhythm, normal heart sounds, and does not have murmur. Pulmonary/Chest: Effort normal. No respiratory distress. He does not have wheezes in the lung fields. He has no rales. Neurological/Psychiatric:He is alert and oriented to person, place, and time. Coordination is  normal. +Lumbar spine pain  His mood isAppropriate and affect is Neutral/Euthymic(normal) .      Prescription pain medication monitoring:                  MEDD current = 15              ORT Score = 0 low risk              Other Risk factors - (mood) Stable              Date of Last Medication Agreement: 3/3/22              Date Naloxone prescribed: 3/24/33              UDT:                          Date of last UDT: 6/22/22                          Adverse report: No              OARRS:                          Checked today: Yes                          Adverse report: No    IMPRESSION:   1. Chronic pain syndrome    2. Lumbosacral spondylosis without myelopathy    3. Chronic bilateral low back pain with bilateral sciatica    4. Compression fracture of T12 vertebra, sequela    5. Back spasm    6. Cervicalgia    7. Chronic pain of left ankle    8. Class 1 obesity due to excess calories with serious comorbidity in adult, unspecified BMI        PLAN:  Informed verbal consent was obtained:  -Patient's opioid therapy will be maintained at current dose  -Medrol dose pack, patient had TPI of the Lumbar spine last month  -Home exercises/Sobeida exercises recommended  -Monitor BP, f/u with PCP if it continues to stay elevated or he becomes symptomatic with SOB, CP, syncopy. he is currently asymptomatic   -CBT techniques- relaxation therapies such as biofeedback, mindfulness based stress reduction, imagery, cognitive restructuring, problem solving discussed with patient   -He was advised weight reduction, diet changes- 800-1200 jen diet, diet diary, exercising, nutritional  consult increased physical activity as tolerated   -Last UDS 6/22/22 consistent  -Return in about 4 weeks (around 2/7/2023). -[place]AR[place] record was obtained and reviewed  for the last one year and no indicators of drug misuse  were found. Any other controlled substance prescriptions  seen on the record have been accounted for, I am aware of the patient receiving these medications. [place] [facility] OAR[place] record will be rechecked as part of office protocol.       Analgesic Plan:              Continue present regimen: Norco 5-325 mg tabs orally q8h prn              Adjust dose of present analgesic: No              Switch analgesics: No              Add/Adjust concomitant therapy: Lidocaine, Motrin, Narcan    I will continue his current medication regimen  which is part of the above treatment schedule. It has been helping with Mr. Abida Shay chronic  medical problems which for this visit include:   Diagnoses of Chronic pain syndrome, Lumbosacral spondylosis without myelopathy, Chronic bilateral low back pain with bilateral sciatica, Compression fracture of T12 vertebra, sequela, Back spasm, Cervicalgia, Chronic pain of left ankle, and Class 1 obesity due to excess calories with serious comorbidity in adult, unspecified BMI were pertinent to this visit. Risks and benefits of the medications and other alternative treatments  including no treatment were discussed with the patient. The common side effects of these medications were also explained to the patient. Informed verbal consent was obtained. Goals of current treatment regimen include improvement in pain, restoration of functioning- with focus on improvement in physical performance, general activity, work or disability,emotional distress, health care utilization and  decreased medication consumption. Will continue to monitor progress towards achieving/maintaining therapeutic goals with special emphasis on  1. Improvement in perceived interfernce  of pain with ADL's. Ability to do home exercises independently. Ability to do household chores indoor and/or outdoor work and social and leisure activities. Improve psychosocial and physical functioning. - he is showing progression towards this treatment goal with the current regimen. He was advised against drinking alcohol with the narcotic pain medicines, advised against driving or handling machinery while adjusting the dose of medicines or if having cognitive  issues related to the current medications. Risk of overdose and death, if medicines not taken as prescribed, were also discussed. If the patient develops new symptoms or if the symptoms worsen, the patient should call the office. While transcribing every attempt was made to maintain the accuracy of the note in terms of it's contents,there may have been some errors made inadvertently. Thank you for allowing me to participate in the care of this patient. Ilana Childress CNP.     Cc: Rolando Tian MD

## 2023-02-07 ENCOUNTER — OFFICE VISIT (OUTPATIENT)
Dept: PAIN MANAGEMENT | Age: 53
End: 2023-02-07

## 2023-02-07 VITALS
HEART RATE: 105 BPM | DIASTOLIC BLOOD PRESSURE: 100 MMHG | WEIGHT: 224 LBS | BODY MASS INDEX: 31.36 KG/M2 | SYSTOLIC BLOOD PRESSURE: 173 MMHG | OXYGEN SATURATION: 96 % | TEMPERATURE: 97.1 F | HEIGHT: 71 IN

## 2023-02-07 DIAGNOSIS — G89.4 CHRONIC PAIN SYNDROME: ICD-10-CM

## 2023-02-07 DIAGNOSIS — E66.09 CLASS 1 OBESITY DUE TO EXCESS CALORIES WITH SERIOUS COMORBIDITY IN ADULT, UNSPECIFIED BMI: ICD-10-CM

## 2023-02-07 DIAGNOSIS — M62.830 BACK SPASM: ICD-10-CM

## 2023-02-07 DIAGNOSIS — M54.2 CERVICALGIA: ICD-10-CM

## 2023-02-07 DIAGNOSIS — M47.817 LUMBOSACRAL SPONDYLOSIS WITHOUT MYELOPATHY: ICD-10-CM

## 2023-02-07 DIAGNOSIS — M25.572 CHRONIC PAIN OF LEFT ANKLE: ICD-10-CM

## 2023-02-07 DIAGNOSIS — G89.29 CHRONIC PAIN OF LEFT ANKLE: ICD-10-CM

## 2023-02-07 DIAGNOSIS — M54.42 CHRONIC BILATERAL LOW BACK PAIN WITH BILATERAL SCIATICA: ICD-10-CM

## 2023-02-07 DIAGNOSIS — G89.29 CHRONIC BILATERAL LOW BACK PAIN WITH BILATERAL SCIATICA: ICD-10-CM

## 2023-02-07 DIAGNOSIS — S22.080S COMPRESSION FRACTURE OF T12 VERTEBRA, SEQUELA: ICD-10-CM

## 2023-02-07 DIAGNOSIS — M54.41 CHRONIC BILATERAL LOW BACK PAIN WITH BILATERAL SCIATICA: ICD-10-CM

## 2023-02-07 RX ORDER — IBUPROFEN 800 MG/1
800 TABLET ORAL EVERY 8 HOURS PRN
Qty: 15 TABLET | Refills: 0 | Status: SHIPPED | OUTPATIENT
Start: 2023-02-07 | End: 2023-02-12

## 2023-02-07 RX ORDER — HYDROCODONE BITARTRATE AND ACETAMINOPHEN 5; 325 MG/1; MG/1
1 TABLET ORAL EVERY 8 HOURS PRN
Qty: 90 TABLET | Refills: 0 | Status: SHIPPED | OUTPATIENT
Start: 2023-02-07 | End: 2023-03-09

## 2023-02-07 NOTE — PROGRESS NOTES
Seaman Code III  1970  [id number]      HISTORY OF PRESENT ILLNESS: Mr. Beti Kevin is a 46 y.o. male returns for a follow up visit for pain management  He has a diagnosis of   1. Chronic pain syndrome    2. Lumbosacral spondylosis without myelopathy    3. Chronic bilateral low back pain with bilateral sciatica    4. Back spasm    5. Compression fracture of T12 vertebra, sequela    6. Cervicalgia    7. Chronic pain of left ankle    8. Class 1 obesity due to excess calories with serious comorbidity in adult, unspecified BMI      As per Information Obtained from the PADT (Patient Assessment and Documentation Tool)    He complains of pain in the  upper, mid and lower back, both legs, both foot  He rates the pain 9/10 and describes it as aching. Current treatment regimen has helped relieve about 20% of the pain. He denies any side effects from the current pain regimen. Patient reports that since the last follow up visit the physical functioning is unchanged, family/social relationships are unchanged, mood is unchanged sleep patterns are unchanged, and that the overall functioning is unchanged. Patient denies misusing/abusing his narcotic pain medications or using any illegal drugs. Upon obtaining medical history from Mr. Beti Kevin states that pain is manageable on current pain therapy. Takes the pain medications as prescribed. Pain to the lumbar region with activities. Requesting for TPI of the Lumbar spine today. Mood/anxiety is stable. Sleep is fair with an average of 5-6 hours. Denies to having issues of constipation. Tolerating activities/house chores with moderate tenderness to the lower back. ALLERGIES: Patients list of allergies were reviewed     MEDICATIONS: Mr. Beti Kevin list of medications were reviewed. His current medications are   Outpatient Medications Prior to Visit   Medication Sig Dispense Refill    naloxone (NARCAN) 4 MG/0.1ML LIQD nasal spray Use as directed 1 each 0    Lidocaine 1.8 % PTCH Apply 1 patch topically daily 30 patch 0    HYDROcodone-acetaminophen (NORCO) 5-325 MG per tablet Take 1 tablet by mouth every 8 hours as needed for Pain for up to 30 days. Intended supply: 3 days. Take lowest dose possible to manage pain Max Daily Amount: 3 tablets 90 tablet 0    ibuprofen (ADVIL;MOTRIN) 800 MG tablet Take 1 tablet by mouth every 8 hours as needed for Pain 15 tablet 0    aspirin EC 81 MG EC tablet Take 1 tablet by mouth daily 30 tablet 5     Facility-Administered Medications Prior to Visit   Medication Dose Route Frequency Provider Last Rate Last Admin    triamcinolone acetonide (KENALOG-40) injection 40 mg  40 mg IntraMUSCular Once Irineo Watts, APRN - CNP           SOCIAL/FAMILY/PAST MEDICAL HISTORY: Mr. Darnell Spence, family and past medical history was reviewed. REVIEW OF SYSTEMS:    Respiratory: Negative for apnea, chest tightness and shortness of breath or change in baseline breathing. Gastrointestinal: Negative for nausea, vomiting, abdominal pain, diarrhea, constipation, blood in stool and abdominal distention. PHYSICAL EXAM:   Nursing note and vitals reviewed. BP (!) 173/100   Pulse (!) 105   Temp 97.1 °F (36.2 °C)   Ht 5' 11\" (1.803 m)   Wt 224 lb (101.6 kg)   SpO2 96%   BMI 31.24 kg/m²   Constitutional: He appears well-developed and well-nourished. No acute distress. Skin: Skin is warm and dry, good turgor. No rash noted. He is not diaphoretic. Cardiovascular: Normal rate, regular rhythm, normal heart sounds, and does not have murmur. Pulmonary/Chest: Effort normal. No respiratory distress. He does not have wheezes in the lung fields. He has no rales. Neurological/Psychiatric:He is alert and oriented to person, place, and time. Coordination is  normal. +Lumbar pain with palpation, 30 degree flexion. Negative for swelling  His mood isAppropriate and affect is Neutral/Euthymic(normal) .      Prescription pain medication monitoring: MEDD current = 15              ORT Score = 0 low risk              Other Risk factors - (mood) Stable              Date of Last Medication Agreement: 3/3/22              Date Naloxone prescribed: 3/24/22              UDT:                          Date of last UDT: 6/22/22                          Adverse report: No              OARRS:                          Checked today: Yes                          Adverse report: No    IMPRESSION:   1. Chronic pain syndrome    2. Lumbosacral spondylosis without myelopathy    3. Chronic bilateral low back pain with bilateral sciatica    4. Back spasm    5. Compression fracture of T12 vertebra, sequela    6. Cervicalgia    7. Chronic pain of left ankle    8. Class 1 obesity due to excess calories with serious comorbidity in adult, unspecified BMI        PLAN:  Informed verbal consent was obtained:  -Patient's opioid therapy will be maintained at current dose  -Home exercises/Sobeida exercises recommended  -Educations provided on TPI of the Lumbar spine, side effects reviewed including a temporary elevation in blood sugars, advised to monitor closely. She verbalized understanding, and voiced interest   -CBT techniques- relaxation therapies such as biofeedback, mindfulness based stress reduction, imagery, cognitive restructuring, problem solving discussed with patient   -Monitor BP, f/u with PCP if it continues to stay elevated or he becomes symptomatic with SOB, CP, syncopy. he is currently asymptomatic   -Advised patient to quit smoking for  health related concerns and to improve the treatment outcomes. Education was given on quitting smoking and the use of different modalities including medications, hypnotherapy, counselling  and biofeedback. These were discussed with patient.   -Last UDS 6/22/22 consistent  -Return in about 4 weeks (around 3/7/2023). -Informed verbal consent was obtained from the patient. Risks and benefits of the procedure were explained.  Complications of the procedure and side effects of kenalog/ lidocaine were discussed with patient. Using 0.25% marcaine and 1cc of kenalog, the the tender trigger point areas in the  bilateral paraspinal lumbar muscles -erector spinae and longgissmus muscles were injected under aseptic condition. Mobilization attempted by stretching. Patient tolerated procedure well. Adv to apply ice today. Analgesic Plan:              Continue present regimen: Norco 5-325 mg tabs q8h prn              Adjust dose of present analgesic: No              Switch analgesics: No              Add/Adjust concomitant therapy: Lidocaine, Motrin, Narcan    I will continue his current medication regimen  which is part of the above treatment schedule. It has been helping with Mr. Akhil Kwan chronic  medical problems which for this visit include:   Diagnoses of Chronic pain syndrome, Lumbosacral spondylosis without myelopathy, Chronic bilateral low back pain with bilateral sciatica, Back spasm, Compression fracture of T12 vertebra, sequela, Cervicalgia, Chronic pain of left ankle, and Class 1 obesity due to excess calories with serious comorbidity in adult, unspecified BMI were pertinent to this visit. Risks and benefits of the medications and other alternative treatments  including no treatment were discussed with the patient. The common side effects of these medications were also explained to the patient. Informed verbal consent was obtained. Goals of current treatment regimen include improvement in pain, restoration of functioning- with focus on improvement in physical performance, general activity, work or disability,emotional distress, health care utilization and  decreased medication consumption. Will continue to monitor progress towards achieving/maintaining therapeutic goals with special emphasis on  1. Improvement in perceived interfernce  of pain with ADL's. Ability to do home exercises independently.  Ability to do household chores indoor and/or outdoor work and social and leisure activities. Improve psychosocial and physical functioning. - he is not showing any significant progress/or showing regression  towards this goal and reassessment and adjustment of goals/treatment have been made. He was advised against drinking alcohol with the narcotic pain medicines, advised against driving or handling machinery while adjusting the dose of medicines or if having cognitive  issues related to the current medications. Risk of overdose and death, if medicines not taken as prescribed, were also discussed. If the patient develops new symptoms or if the symptoms worsen, the patient should call the office. While transcribing every attempt was made to maintain the accuracy of the note in terms of it's contents,there may have been some errors made inadvertently. Thank you for allowing me to participate in the care of this patient. Taylor Guerra CNP.     Cc: Ginnie Eisenmenger, MD

## 2023-02-20 RX ORDER — TRIAMCINOLONE ACETONIDE 40 MG/ML
40 INJECTION, SUSPENSION INTRA-ARTICULAR; INTRAMUSCULAR ONCE
Status: COMPLETED | OUTPATIENT
Start: 2023-02-20 | End: 2023-02-20

## 2023-02-20 RX ADMIN — TRIAMCINOLONE ACETONIDE 40 MG: 40 INJECTION, SUSPENSION INTRA-ARTICULAR; INTRAMUSCULAR at 16:59

## 2023-02-21 NOTE — PROGRESS NOTES
[facility]  Cynthia De Souza MD  [address] [facility]  1962    61 y.o. female     Date of Visit: 2/21/2023    Last seen: 1 year ago for FSE and more recently for btx, filler    Chief Complaint: moles/lesions, btx  Chief Complaint   Patient presents with    Skin Exam     FSE        HX: multi nevi    Procedure     Botox    Medication Refill     Retin A- Gel? History of Present Illness:    *2 family weddings this summer - June and end of summer ([place]); June wedding and that engagement party is planned to be formal and more extravagant     1. Here for evaluation of multiple asx pigmented lesions on the trunk and extremities, present for many years; no change in size/shape/color of any lesions; no bleeding lesions. No personal hx of skin cancer. Dad with hx of skin cancer in his 66's. No known family hx of melanoma. 2. She notes a dry spot on the R back - asx.    3. She notes a subtle papules on the upper FH. Asx. Will rtn for Vbeam for telangiectasias, angioma on the nose  S/p ~4-5 treatment with Vbeam of a red lesion on the nasal dorsum, c/w angioma/telangiectasias. Has had significant improvement with trx with Vbeam - had purpura transiently but reports treatment was much more tolerable than previous trx. Very happy with trx and results. Barely visible. She previously noted that it was treated many (7+?) times by Dr. Rafi Saenz w/o clearance so was referred here and had a scab/wound with last treatment. Last treatment w them was around March of 2018. Unsure what type of laser/light device was used to treat - possibly IPL/BBL. ROS:  Gen: feels well; good general health  SKin: no changing moles/lesions    Past Medical History, Family History, Surgical History, Medications and Allergies reviewed.     Outpatient Medications Marked as Taking for the 2/21/23 encounter (Procedure visit) with Ronn Cruz MD   Medication Sig Dispense Refill    estradiol Corinne Castrejon III  1970  [id number]      HISTORY OF PRESENT ILLNESS: Mr. Jhon Ortega is a 46 y.o. male returns for a follow up visit for pain management  He has a diagnosis of   1. Chronic pain syndrome    2. Lumbosacral spondylosis without myelopathy    3. Spinal stenosis of lumbar region, unspecified whether neurogenic claudication present, mild    4. Compression fracture of T12 vertebra, sequela    5. Chronic bilateral low back pain with bilateral sciatica    6. Back spasm    7. Cervicalgia    8. Chronic pain of left ankle    9. Class 1 obesity due to excess calories without serious comorbidity in adult, unspecified BMI      As per Information Obtained from the PADT (Patient Assessment and Documentation Tool)    He complains of pain in the both ankle(s): entire area, both buttocks, both foot/feet: entire area, both hand(s): entire area, both knee(s), both leg(s): entire area, bilateral lower back, bilateral mid-back, neck, both shoulder(s): entire area, and bilateral upper back He rates the pain 10/10 and describes it as aching, burning, numbness, pins and needles, stabbing. Current treatment regimen has helped relieve about 20% of the pain. He denies any side effects from the current pain regimen. Patient reports that since the last follow up visit the physical functioning is worse, family/social relationships are unchanged, mood is unchanged sleep patterns are worse, and that the overall functioning is worse. Patient denies misusing/abusing his narcotic pain medications or using any illegal drugs. Upon obtaining medical history from Mr. Jhon Ortega states that pain is manageable on current pain therapy. Takes the pain medications as prescribed. Mood/anxiety is stable. Sleep is fair with an average of 5-6 hours. Denies to having issues of constipation. Tolerating activities/house chores with moderate tenderness to the lower back.      ALLERGIES: Patients list of allergies were reviewed (ESTRACE) 0.1 MG/GM vaginal cream       Cholecalciferol (VITAMIN D3 PO) Take 5,000 Int'l Units by mouth daily      Calcium 500-100 MG-UNIT CHEW Take by mouth      tretinoin (RETIN-A) 0.01 % gel Apply topically nightly. 45 g 5     No Known Allergies    No past medical history on file. No past surgical history on file. Physical Examination     Gen, well-appearing  FSE today     trunk and extremities with scattered brown macules and papules   Dorsum of the nose with faint remaining telangiectasias and cheeks with scattered red telangiectasias  Back with stuck-on dull-surfaced brown papule    Upper FH with subtle skin-colored - yellowish small papule    Baseline and 4-2019 (after 3 trx):        Assessment and Plan     1. Benign-appearing nevi and lentigines  - Monitor for ABCD's of MM and si/sx of NMSC  Continue sun protection - OTC sunscreen with SPF 30-50+ recommended and reviewed usage  Encouraged skin check yearly (sooner if indicated), self checks    *cerave products     2. SK - back  3. Likely [address] - upper FH  - reassured re benign fx    4. Spider angioma + telangiectasias - improved  - plan for Vbeam this spring/summer    Was planning for botox/filler today but will delay to optimize timing with weddings. Can rtn for botox in spring before June wedding and engagement party, then repeat if needed before Sept wedding. Can decided if she wants to do  the spring or wait until closer to fall wedding. *path results from 2019 from [facility] (upper back - scanned in media - benign nevus)    Refilled tretinoin - tolerating well. MEDICATIONS: Mr. Chencho Hernandez list of medications were reviewed. His current medications are   Outpatient Medications Prior to Visit   Medication Sig Dispense Refill    naloxone (NARCAN) 4 MG/0.1ML LIQD nasal spray Use as directed 1 each 0    aspirin EC 81 MG EC tablet Take 1 tablet by mouth daily 30 tablet 5    Lidocaine 1.8 % PTCH Apply 1 patch topically daily 30 patch 0    HYDROcodone-acetaminophen (NORCO) 5-325 MG per tablet Take 1 tablet by mouth every 8 hours as needed for Pain for up to 30 days. Intended supply: 3 days. Take lowest dose possible to manage pain 90 tablet 0    ibuprofen (ADVIL;MOTRIN) 800 MG tablet Take 1 tablet by mouth every 8 hours as needed for Pain 90 tablet 3     No facility-administered medications prior to visit. SOCIAL/FAMILY/PAST MEDICAL HISTORY: Mr. Kelley Cardona, family and past medical history was reviewed. REVIEW OF SYSTEMS:    Respiratory: Negative for apnea, chest tightness and shortness of breath or change in baseline breathing. Gastrointestinal: Negative for nausea, vomiting, abdominal pain, diarrhea, constipation, blood in stool and abdominal distention. PHYSICAL EXAM:   Nursing note and vitals reviewed. BP (!) (P) 133/91   Pulse (!) (P) 105   Temp (P) 97.7 °F (36.5 °C)   Wt (P) 221 lb (100.2 kg)   SpO2 (P) 94%   BMI (P) 30.82 kg/m²   Constitutional: He appears well-developed and well-nourished. No acute distress. Skin: Skin is warm and dry, good turgor. No rash noted. He is not diaphoretic. Cardiovascular: Normal rate, regular rhythm, normal heart sounds, and does not have murmur. Pulmonary/Chest: Effort normal. No respiratory distress. He does not have wheezes in the lung fields. He has no rales. Neurological/Psychiatric:He is alert and oriented to person, place, and time. Coordination is  normal.  His mood isAppropriate and affect is Neutral/Euthymic(normal) .      Prescription pain medication monitoring:                  MEDD current = 15              ORT Score = 0 low risk              Other Risk factors - (mood) Stable              Date of Last Medication Agreement: 3/3/22              Date Naloxone prescribed: 3/4/22              UDT:                          Date of last UDT: 6/22/22                          Adverse report: No;               OARRS:                          Checked today: Yes                          Adverse report: No    IMPRESSION:   1. Chronic pain syndrome    2. Lumbosacral spondylosis without myelopathy    3. Spinal stenosis of lumbar region, unspecified whether neurogenic claudication present, mild    4. Compression fracture of T12 vertebra, sequela    5. Chronic bilateral low back pain with bilateral sciatica    6. Back spasm    7. Cervicalgia    8. Chronic pain of left ankle    9. Class 1 obesity due to excess calories without serious comorbidity in adult, unspecified BMI      PLAN:  Informed verbal consent was obtained:  - Patient's opioid therapy will be maintained at current dose  -Home exercises/Sobeida exercises recommended  -CBT techniques- relaxation therapies such as biofeedback, mindfulness based stress reduction, imagery, cognitive restructuring, problem solving discussed with patient   -He was advised weight reduction, diet changes- 800-1200 jen diet, diet diary, exercising, nutritional  consult increased physical activity as tolerated   -Last UDS 6/22/22 consistent  -Return in about 4 weeks (around 9/14/2022). -OAR[place] record was obtained and reviewed  for the last one year and no indicators of drug misuse  were found. Any other controlled substance prescriptions  seen on the record have been accounted for, I am aware of the patient receiving these medications. Jd Davis OARRS record will be rechecked as part of office protocol.        Analgesic Plan:              Continue present regimen: Norco 5-325 mg tabs q8h prn              Adjust dose of present analgesic: No              Switch analgesics: No Add/Adjust concomitant therapy: Lidocaine, Motrin, Narcan    Current Outpatient Medications   Medication Sig Dispense Refill    Lidocaine 1.8 % PTCH Apply 1 patch topically daily 30 patch 0    HYDROcodone-acetaminophen (NORCO) 5-325 MG per tablet Take 1 tablet by mouth every 8 hours as needed for Pain for up to 30 days. Intended supply: 3 days. Take lowest dose possible to manage pain 90 tablet 0    ibuprofen (ADVIL;MOTRIN) 800 MG tablet Take 1 tablet by mouth every 8 hours as needed for Pain 90 tablet 3    naloxone (NARCAN) 4 MG/0.1ML LIQD nasal spray Use as directed 1 each 0    aspirin EC 81 MG EC tablet Take 1 tablet by mouth daily 30 tablet 5     No current facility-administered medications for this visit. I will continue his current medication regimen  which is part of the above treatment schedule. It has been helping with Mr. Travon Bennett chronic  medical problems which for this visit include:   Diagnoses of Chronic pain syndrome, Lumbosacral spondylosis without myelopathy, Spinal stenosis of lumbar region, unspecified whether neurogenic claudication present, mild, Compression fracture of T12 vertebra, sequela, Chronic bilateral low back pain with bilateral sciatica, Back spasm, Cervicalgia, Chronic pain of left ankle, and Class 1 obesity due to excess calories without serious comorbidity in adult, unspecified BMI were pertinent to this visit. Risks and benefits of the medications and other alternative treatments  including no treatment were discussed with the patient. The common side effects of these medications were also explained to the patient. Informed verbal consent was obtained. Goals of current treatment regimen include improvement in pain, restoration of functioning- with focus on improvement in physical performance, general activity, work or disability,emotional distress, health care utilization and  decreased medication consumption.  Will continue to monitor progress towards achieving/maintaining therapeutic goals with special emphasis on  1. Improvement in perceived interfernce  of pain with ADL's. Ability to do home exercises independently. Ability to do household chores indoor and/or outdoor work and social and leisure activities. Improve psychosocial and physical functioning. - he is showing progression towards this treatment goal with the current regimen. He was advised against drinking alcohol with the narcotic pain medicines, advised against driving or handling machinery while adjusting the dose of medicines or if having cognitive  issues related to the current medications. Risk of overdose and death, if medicines not taken as prescribed, were also discussed. If the patient develops new symptoms or if the symptoms worsen, the patient should call the office. While transcribing every attempt was made to maintain the accuracy of the note in terms of it's contents,there may have been some errors made inadvertently. Thank you for allowing me to participate in the care of this patient. Osman Colón CNP.     Cc: Mac Cedillo MD

## 2023-03-07 ENCOUNTER — OFFICE VISIT (OUTPATIENT)
Dept: PAIN MANAGEMENT | Age: 53
End: 2023-03-07
Payer: COMMERCIAL

## 2023-03-07 VITALS
DIASTOLIC BLOOD PRESSURE: 94 MMHG | HEART RATE: 127 BPM | OXYGEN SATURATION: 96 % | BODY MASS INDEX: 30.8 KG/M2 | SYSTOLIC BLOOD PRESSURE: 132 MMHG | HEIGHT: 71 IN | TEMPERATURE: 98.1 F | WEIGHT: 220 LBS

## 2023-03-07 DIAGNOSIS — M62.830 BACK SPASM: ICD-10-CM

## 2023-03-07 DIAGNOSIS — G89.29 CHRONIC PAIN OF LEFT ANKLE: ICD-10-CM

## 2023-03-07 DIAGNOSIS — M47.817 LUMBOSACRAL SPONDYLOSIS WITHOUT MYELOPATHY: ICD-10-CM

## 2023-03-07 DIAGNOSIS — G89.4 CHRONIC PAIN SYNDROME: ICD-10-CM

## 2023-03-07 DIAGNOSIS — G89.29 CHRONIC BILATERAL LOW BACK PAIN WITH BILATERAL SCIATICA: ICD-10-CM

## 2023-03-07 DIAGNOSIS — M54.41 CHRONIC BILATERAL LOW BACK PAIN WITH BILATERAL SCIATICA: ICD-10-CM

## 2023-03-07 DIAGNOSIS — M54.42 CHRONIC BILATERAL LOW BACK PAIN WITH BILATERAL SCIATICA: ICD-10-CM

## 2023-03-07 DIAGNOSIS — M54.2 CERVICALGIA: ICD-10-CM

## 2023-03-07 DIAGNOSIS — E66.09 CLASS 1 OBESITY DUE TO EXCESS CALORIES WITH SERIOUS COMORBIDITY IN ADULT, UNSPECIFIED BMI: ICD-10-CM

## 2023-03-07 DIAGNOSIS — S22.080S COMPRESSION FRACTURE OF T12 VERTEBRA, SEQUELA: ICD-10-CM

## 2023-03-07 DIAGNOSIS — M25.572 CHRONIC PAIN OF LEFT ANKLE: ICD-10-CM

## 2023-03-07 PROCEDURE — 3017F COLORECTAL CA SCREEN DOC REV: CPT | Performed by: NURSE PRACTITIONER

## 2023-03-07 PROCEDURE — 4004F PT TOBACCO SCREEN RCVD TLK: CPT | Performed by: NURSE PRACTITIONER

## 2023-03-07 PROCEDURE — G8417 CALC BMI ABV UP PARAM F/U: HCPCS | Performed by: NURSE PRACTITIONER

## 2023-03-07 PROCEDURE — G8484 FLU IMMUNIZE NO ADMIN: HCPCS | Performed by: NURSE PRACTITIONER

## 2023-03-07 PROCEDURE — 99214 OFFICE O/P EST MOD 30 MIN: CPT | Performed by: NURSE PRACTITIONER

## 2023-03-07 PROCEDURE — G8427 DOCREV CUR MEDS BY ELIG CLIN: HCPCS | Performed by: NURSE PRACTITIONER

## 2023-03-07 RX ORDER — HYDROCODONE BITARTRATE AND ACETAMINOPHEN 5; 325 MG/1; MG/1
1 TABLET ORAL EVERY 8 HOURS PRN
Qty: 90 TABLET | Refills: 0 | Status: SHIPPED | OUTPATIENT
Start: 2023-03-07 | End: 2023-04-06

## 2023-03-07 RX ORDER — METHYLPREDNISOLONE 4 MG/1
TABLET ORAL
Qty: 1 KIT | Refills: 0 | Status: SHIPPED | OUTPATIENT
Start: 2023-03-07 | End: 2023-03-13

## 2023-03-07 RX ORDER — IBUPROFEN 800 MG/1
800 TABLET ORAL EVERY 8 HOURS PRN
Qty: 15 TABLET | Refills: 0 | Status: SHIPPED | OUTPATIENT
Start: 2023-03-07 | End: 2023-03-12

## 2023-03-07 NOTE — PROGRESS NOTES
Juliane [facility] III  1970  [id number]      HISTORY OF PRESENT ILLNESS: Mr. Jessica Fritz is a 46 y.o. male returns for a follow up visit for pain management  He has a diagnosis of   1. Chronic pain syndrome    2. Chronic bilateral low back pain with bilateral sciatica    3. Lumbosacral spondylosis without myelopathy    4. Compression fracture of T12 vertebra, sequela    5. Back spasm    6. Cervicalgia    7. Chronic pain of left ankle    8. Class 1 obesity due to excess calories with serious comorbidity in adult, unspecified BMI      As per Information Obtained from the PADT (Patient Assessment and Documentation Tool)    He complains of pain in the  neck, upper mid and lower back, both legs  He rates the pain 9/10 and describes it as aching. Current treatment regimen has helped relieve about 20% of the pain. He denies any side effects from the current pain regimen. Patient reports that since the last follow up visit the physical functioning is worse, family/social relationships are unchanged, mood is unchanged sleep patterns are unchanged, and that the overall functioning is worse. Patient denies misusing/abusing his narcotic pain medications or using any illegal drugs. Upon obtaining medical history from Mr. Jessica Fritz states that pain is manageable on current pain therapy. Takes the pain medications as prescribed. Still has pain in the lower back with activities. Mood/anxiety is stable. Sleep is fair with an average of 5-6 hours. Denies to having issues of constipation. Tolerating activities/house chores with moderate tenderness to the lower back. Working on smoking cessation    ALLERGIES: Patients list of allergies were reviewed     MEDICATIONS: Mr. Jessica Fritz list of medications were reviewed. His current medications are   Outpatient Medications Prior to Visit   Medication Sig Dispense Refill    Lidocaine 1.8 % PTCH Apply 1 patch topically daily 30 patch 0    HYDROcodone-acetaminophen (NORCO) 5-325 MG per tablet Take 1 tablet by mouth every 8 hours as needed for Pain for up to 30 days. Intended supply: 3 days. Take lowest dose possible to manage pain Max Daily Amount: 3 tablets 90 tablet 0    ibuprofen (ADVIL;MOTRIN) 800 MG tablet Take 1 tablet by mouth every 8 hours as needed for Pain 15 tablet 0    naloxone (NARCAN) 4 MG/0.1ML LIQD nasal spray Use as directed 1 each 0    aspirin EC 81 MG EC tablet Take 1 tablet by mouth daily 30 tablet 5     Facility-Administered Medications Prior to Visit   Medication Dose Route Frequency Provider Last Rate Last Admin    triamcinolone acetonide (KENALOG-40) injection 40 mg  40 mg IntraMUSCular Once Renetta Sheffield APRN - CNP           SOCIAL/FAMILY/PAST MEDICAL HISTORY: Mr. Emma Jara, family and past medical history was reviewed. REVIEW OF SYSTEMS:    Respiratory: Negative for apnea, chest tightness and shortness of breath or change in baseline breathing. Gastrointestinal: Negative for nausea, vomiting, abdominal pain, diarrhea, constipation, blood in stool and abdominal distention. PHYSICAL EXAM:   Nursing note and vitals reviewed. BP (!) 132/94   Pulse (!) 127   Temp 98.1 °F (36.7 °C)   Ht 5' 11\" (1.803 m)   Wt 220 lb (99.8 kg)   SpO2 96%   BMI 30.68 kg/m²   Constitutional: He appears well-developed and well-nourished. No acute distress. Skin: Skin is warm and dry, good turgor. 1No rash noted. He is not diaphoretic. Cardiovascular: Normal rate, regular rhythm, normal heart sounds, and does not have murmur. Pulmonary/Chest: Effort normal. No respiratory distress. He does not have wheezes in the lung fields. He has no rales. Neurological/Psychiatric:He is alert and oriented to person, place, and time. Coordination is  normal. +Lumbar spine pain  His mood isAppropriate and affect is Neutral/Euthymic(normal) .      Prescription pain medication monitoring:                  MEDD current = 15              ORT Score = 0 low risk              Other Risk factors - (mood) Stable              Date of Last Medication Agreement: 1/10/23              Date Naloxone prescribed: 3/7/23              UDT:                          Date of last UDT: 6/22/22                          Adverse report: No              OARRS:                          Checked today: Yes                          Adverse report: No    IMPRESSION:   1. Chronic pain syndrome    2. Chronic bilateral low back pain with bilateral sciatica    3. Lumbosacral spondylosis without myelopathy    4. Compression fracture of T12 vertebra, sequela    5. Back spasm    6. Cervicalgia    7. Chronic pain of left ankle    8. Class 1 obesity due to excess calories with serious comorbidity in adult, unspecified BMI        PLAN:  Informed verbal consent was obtained:  -Patient's opioid therapy will be maintained at current dose  -Medrol dose pack  -Home exercises/Sobeida exercises recommended  -Monitor BP, f/u with PCP if it continues to stay elevated or he becomes symptomatic with SOB, CP, syncopy. he is currently asymptomatic   -CBT techniques- relaxation therapies such as biofeedback, mindfulness based stress reduction, imagery, cognitive restructuring, problem solving discussed with patient   -He was advised weight reduction, diet changes- 800-1200 jen diet, diet diary, exercising, nutritional  consult increased physical activity as tolerated   -Last UDS 6/22/22 consistent  -Return in about 4 weeks (around 4/4/2023). Analgesic Plan:              Continue present regimen: Norco 5-325 mg tabs q8h prn              Adjust dose of present analgesic: No              Switch analgesics: No              Add/Adjust concomitant therapy: Lidocaine, Motrin, Narcan    I will continue his current medication regimen  which is part of the above treatment schedule.  It has been helping with Mr. Kayleen Najera chronic  medical problems which for this visit include:   Diagnoses of Chronic pain syndrome, Chronic bilateral low back pain with bilateral sciatica, Lumbosacral spondylosis without myelopathy, Compression fracture of T12 vertebra, sequela, Back spasm, Cervicalgia, Chronic pain of left ankle, and Class 1 obesity due to excess calories with serious comorbidity in adult, unspecified BMI were pertinent to this visit. Risks and benefits of the medications and other alternative treatments  including no treatment were discussed with the patient. The common side effects of these medications were also explained to the patient. Informed verbal consent was obtained. Goals of current treatment regimen include improvement in pain, restoration of functioning- with focus on improvement in physical performance, general activity, work or disability,emotional distress, health care utilization and  decreased medication consumption. Will continue to monitor progress towards achieving/maintaining therapeutic goals with special emphasis on  1. Improvement in perceived interfernce  of pain with ADL's. Ability to do home exercises independently. Ability to do household chores indoor and/or outdoor work and social and leisure activities. Improve psychosocial and physical functioning. - he is showing progression towards this treatment goal with the current regimen. He was advised against drinking alcohol with the narcotic pain medicines, advised against driving or handling machinery while adjusting the dose of medicines or if having cognitive  issues related to the current medications. Risk of overdose and death, if medicines not taken as prescribed, were also discussed. If the patient develops new symptoms or if the symptoms worsen, the patient should call the office. While transcribing every attempt was made to maintain the accuracy of the note in terms of it's contents,there may have been some errors made inadvertently. Thank you for allowing me to participate in the care of this patient. Stanford Haywood CNP.     Cc: Marion Baer MD

## 2023-04-05 DIAGNOSIS — M54.2 CERVICALGIA: ICD-10-CM

## 2023-04-05 DIAGNOSIS — G89.4 CHRONIC PAIN SYNDROME: ICD-10-CM

## 2023-04-05 DIAGNOSIS — M54.42 CHRONIC BILATERAL LOW BACK PAIN WITH BILATERAL SCIATICA: ICD-10-CM

## 2023-04-05 DIAGNOSIS — G89.29 CHRONIC BILATERAL LOW BACK PAIN WITH BILATERAL SCIATICA: ICD-10-CM

## 2023-04-05 DIAGNOSIS — G89.29 CHRONIC PAIN OF LEFT ANKLE: ICD-10-CM

## 2023-04-05 DIAGNOSIS — M47.817 LUMBOSACRAL SPONDYLOSIS WITHOUT MYELOPATHY: ICD-10-CM

## 2023-04-05 DIAGNOSIS — M54.41 CHRONIC BILATERAL LOW BACK PAIN WITH BILATERAL SCIATICA: ICD-10-CM

## 2023-04-05 DIAGNOSIS — M25.572 CHRONIC PAIN OF LEFT ANKLE: ICD-10-CM

## 2023-04-05 DIAGNOSIS — M62.830 BACK SPASM: ICD-10-CM

## 2023-04-05 DIAGNOSIS — S22.080S COMPRESSION FRACTURE OF T12 VERTEBRA, SEQUELA: ICD-10-CM

## 2023-04-05 RX ORDER — HYDROCODONE BITARTRATE AND ACETAMINOPHEN 5; 325 MG/1; MG/1
1 TABLET ORAL EVERY 8 HOURS PRN
Qty: 90 TABLET | Refills: 0 | Status: CANCELLED | OUTPATIENT
Start: 2023-04-05 | End: 2023-05-05

## 2023-04-05 RX ORDER — IBUPROFEN 800 MG/1
800 TABLET ORAL EVERY 8 HOURS PRN
Qty: 15 TABLET | Refills: 0 | Status: CANCELLED | OUTPATIENT
Start: 2023-04-05 | End: 2023-04-10

## 2023-05-09 ENCOUNTER — OFFICE VISIT (OUTPATIENT)
Dept: PAIN MANAGEMENT | Age: 53
End: 2023-05-09
Payer: COMMERCIAL

## 2023-05-09 VITALS
HEIGHT: 71 IN | OXYGEN SATURATION: 98 % | BODY MASS INDEX: 31.64 KG/M2 | WEIGHT: 226 LBS | SYSTOLIC BLOOD PRESSURE: 146 MMHG | TEMPERATURE: 97.2 F | DIASTOLIC BLOOD PRESSURE: 99 MMHG | HEART RATE: 104 BPM

## 2023-05-09 DIAGNOSIS — M25.572 CHRONIC PAIN OF LEFT ANKLE: ICD-10-CM

## 2023-05-09 DIAGNOSIS — G89.4 CHRONIC PAIN SYNDROME: ICD-10-CM

## 2023-05-09 DIAGNOSIS — M54.41 CHRONIC BILATERAL LOW BACK PAIN WITH BILATERAL SCIATICA: ICD-10-CM

## 2023-05-09 DIAGNOSIS — M62.830 BACK SPASM: ICD-10-CM

## 2023-05-09 DIAGNOSIS — G89.29 CHRONIC PAIN OF LEFT ANKLE: ICD-10-CM

## 2023-05-09 DIAGNOSIS — M54.2 CERVICALGIA: ICD-10-CM

## 2023-05-09 DIAGNOSIS — S22.080S COMPRESSION FRACTURE OF T12 VERTEBRA, SEQUELA: ICD-10-CM

## 2023-05-09 DIAGNOSIS — G89.29 CHRONIC BILATERAL LOW BACK PAIN WITH BILATERAL SCIATICA: ICD-10-CM

## 2023-05-09 DIAGNOSIS — E66.09 CLASS 1 OBESITY DUE TO EXCESS CALORIES WITH SERIOUS COMORBIDITY IN ADULT, UNSPECIFIED BMI: ICD-10-CM

## 2023-05-09 DIAGNOSIS — M54.42 CHRONIC BILATERAL LOW BACK PAIN WITH BILATERAL SCIATICA: ICD-10-CM

## 2023-05-09 DIAGNOSIS — M47.817 LUMBOSACRAL SPONDYLOSIS WITHOUT MYELOPATHY: ICD-10-CM

## 2023-05-09 PROCEDURE — 3017F COLORECTAL CA SCREEN DOC REV: CPT | Performed by: NURSE PRACTITIONER

## 2023-05-09 PROCEDURE — G8427 DOCREV CUR MEDS BY ELIG CLIN: HCPCS | Performed by: NURSE PRACTITIONER

## 2023-05-09 PROCEDURE — 99213 OFFICE O/P EST LOW 20 MIN: CPT | Performed by: NURSE PRACTITIONER

## 2023-05-09 PROCEDURE — G8417 CALC BMI ABV UP PARAM F/U: HCPCS | Performed by: NURSE PRACTITIONER

## 2023-05-09 PROCEDURE — 4004F PT TOBACCO SCREEN RCVD TLK: CPT | Performed by: NURSE PRACTITIONER

## 2023-05-09 RX ORDER — GABAPENTIN 300 MG/1
1 TABLET, FILM COATED ORAL NIGHTLY
Qty: 30 TABLET | Refills: 0 | Status: SHIPPED | OUTPATIENT
Start: 2023-05-09 | End: 2023-06-08

## 2023-05-09 RX ORDER — HYDROCODONE BITARTRATE AND ACETAMINOPHEN 5; 325 MG/1; MG/1
1 TABLET ORAL EVERY 6 HOURS PRN
Qty: 112 TABLET | Refills: 0 | Status: SHIPPED | OUTPATIENT
Start: 2023-05-09 | End: 2023-06-06

## 2023-05-09 RX ORDER — IBUPROFEN 800 MG/1
800 TABLET ORAL EVERY 8 HOURS PRN
Qty: 15 TABLET | Refills: 0 | Status: SHIPPED | OUTPATIENT
Start: 2023-05-09 | End: 2023-05-14

## 2023-05-09 NOTE — PROGRESS NOTES
Agnieszka Angelia [facility]  1970  [id number]      HISTORY OF PRESENT ILLNESS: Mr. Abdi Matthews is a 48 y.o. male returns for a follow up visit for pain management  He has a diagnosis of   1. Chronic pain syndrome    2. Lumbosacral spondylosis without myelopathy    3. Chronic bilateral low back pain with bilateral sciatica    4. Compression fracture of T12 vertebra, sequela    5. Back spasm    6. Cervicalgia    7. Chronic pain of left ankle    8. Class 1 obesity due to excess calories with serious comorbidity in adult, unspecified BMI      As per Information Obtained from the PADT (Patient Assessment and Documentation Tool)    He complains of pain in the  neck, lower back both legs, both feet  He rates the pain 8/10 and describes it as aching. Current treatment regimen has helped relieve about 30% of the pain. He denies any side effects from the current pain regimen. Patient reports that since the last follow up visit the physical functioning is unchanged, family/social relationships are unchanged, mood is unchanged sleep patterns are unchanged, and that the overall functioning is unchanged. Patient denies misusing/abusing his narcotic pain medications or using any illegal drugs. Upon obtaining medical history from Mr. Abdi Matthews states that pain is manageable on current pain therapy. Takes the pain medications as prescribed. Mood/anxiety is stable. Sleep is fair with an average of 5-6 hours. Denies to having issues of constipation. Tolerating activities/house chores with moderate tenderness to the lower back. Working on smoking cessation    ALLERGIES: Patients list of allergies were reviewed     MEDICATIONS: Mr. Abdi Matthews list of medications were reviewed. His current medications are   Outpatient Medications Prior to Visit   Medication Sig Dispense Refill    ibuprofen (ADVIL;MOTRIN) 800 MG tablet Take 1 tablet by mouth every 8 hours as needed for Pain 15 tablet 0    HYDROcodone-acetaminophen (NORCO) 5-325 MG per

## 2023-06-06 ENCOUNTER — TELEPHONE (OUTPATIENT)
Dept: PAIN MANAGEMENT | Age: 53
End: 2023-06-06

## 2023-06-06 NOTE — TELEPHONE ENCOUNTER
Patient would like an extension for his Sonhadley Alberts until his next FU appointment on 6/13.  Patient would like a call back       Please advise

## 2023-06-07 ENCOUNTER — TELEPHONE (OUTPATIENT)
Dept: PAIN MANAGEMENT | Age: 53
End: 2023-06-07

## 2023-06-07 NOTE — TELEPHONE ENCOUNTER
Patient called and said he will not have enough medication to get him to his follow up on 6/13 since he was due to come in this week.      Please advise

## 2023-06-07 NOTE — TELEPHONE ENCOUNTER
I tried to call the pt, but recording states the number is no longer in service. Mrs Lizette Mustafa states she will not give an extension.

## 2023-06-08 NOTE — TELEPHONE ENCOUNTER
Patient called and I advised him of the above note. Patients girlfriend stated the patient can not walk and she needs him to have his medicine so he can drive her to her dental appointment.      Patient is requesting a callback at this number [phone]

## 2023-06-09 NOTE — TELEPHONE ENCOUNTER
Tried to call pt at the [phone] #, MB is full    Wanted to let pt know, that per PKA no extension

## 2023-06-13 ENCOUNTER — OFFICE VISIT (OUTPATIENT)
Dept: PAIN MANAGEMENT | Age: 53
End: 2023-06-13

## 2023-06-13 VITALS
HEART RATE: 96 BPM | SYSTOLIC BLOOD PRESSURE: 152 MMHG | TEMPERATURE: 97.2 F | DIASTOLIC BLOOD PRESSURE: 105 MMHG | OXYGEN SATURATION: 97 %

## 2023-06-13 DIAGNOSIS — M54.2 CERVICALGIA: ICD-10-CM

## 2023-06-13 DIAGNOSIS — M25.572 CHRONIC PAIN OF LEFT ANKLE: ICD-10-CM

## 2023-06-13 DIAGNOSIS — M47.817 LUMBOSACRAL SPONDYLOSIS WITHOUT MYELOPATHY: ICD-10-CM

## 2023-06-13 DIAGNOSIS — M62.830 BACK SPASM: ICD-10-CM

## 2023-06-13 DIAGNOSIS — M54.42 CHRONIC BILATERAL LOW BACK PAIN WITH BILATERAL SCIATICA: ICD-10-CM

## 2023-06-13 DIAGNOSIS — Z51.81 ENCOUNTER FOR THERAPEUTIC DRUG MONITORING: ICD-10-CM

## 2023-06-13 DIAGNOSIS — G89.4 CHRONIC PAIN SYNDROME: ICD-10-CM

## 2023-06-13 DIAGNOSIS — S22.080S COMPRESSION FRACTURE OF T12 VERTEBRA, SEQUELA: ICD-10-CM

## 2023-06-13 DIAGNOSIS — G89.29 CHRONIC BILATERAL LOW BACK PAIN WITH BILATERAL SCIATICA: ICD-10-CM

## 2023-06-13 DIAGNOSIS — G89.29 CHRONIC PAIN OF LEFT ANKLE: ICD-10-CM

## 2023-06-13 DIAGNOSIS — M54.41 CHRONIC BILATERAL LOW BACK PAIN WITH BILATERAL SCIATICA: ICD-10-CM

## 2023-06-13 RX ORDER — HYDROCODONE BITARTRATE AND ACETAMINOPHEN 5; 325 MG/1; MG/1
1 TABLET ORAL EVERY 6 HOURS PRN
Qty: 120 TABLET | Refills: 0 | Status: SHIPPED | OUTPATIENT
Start: 2023-06-13 | End: 2023-07-13 | Stop reason: SDUPTHER

## 2023-06-13 RX ORDER — GABAPENTIN 300 MG/1
1 TABLET, FILM COATED ORAL NIGHTLY
Qty: 30 TABLET | Refills: 0 | Status: SHIPPED | OUTPATIENT
Start: 2023-06-13 | End: 2023-06-15

## 2023-06-13 RX ORDER — IBUPROFEN 800 MG/1
800 TABLET ORAL EVERY 8 HOURS PRN
Qty: 15 TABLET | Refills: 0 | Status: SHIPPED | OUTPATIENT
Start: 2023-06-13 | End: 2023-07-18 | Stop reason: SDUPTHER

## 2023-06-13 RX ADMIN — TRIAMCINOLONE ACETONIDE 40 MG: 40 INJECTION, SUSPENSION INTRA-ARTICULAR; INTRAMUSCULAR at 11:00

## 2023-06-15 DIAGNOSIS — M54.42 CHRONIC BILATERAL LOW BACK PAIN WITH BILATERAL SCIATICA: ICD-10-CM

## 2023-06-15 DIAGNOSIS — M47.817 LUMBOSACRAL SPONDYLOSIS WITHOUT MYELOPATHY: ICD-10-CM

## 2023-06-15 DIAGNOSIS — M54.41 CHRONIC BILATERAL LOW BACK PAIN WITH BILATERAL SCIATICA: ICD-10-CM

## 2023-06-15 DIAGNOSIS — S22.080S COMPRESSION FRACTURE OF T12 VERTEBRA, SEQUELA: ICD-10-CM

## 2023-06-15 DIAGNOSIS — G89.4 CHRONIC PAIN SYNDROME: Primary | ICD-10-CM

## 2023-06-15 DIAGNOSIS — G89.29 CHRONIC BILATERAL LOW BACK PAIN WITH BILATERAL SCIATICA: ICD-10-CM

## 2023-06-15 RX ORDER — GABAPENTIN 300 MG/1
300 CAPSULE ORAL NIGHTLY
Qty: 30 CAPSULE | Refills: 0 | Status: SHIPPED | OUTPATIENT
Start: 2023-06-15 | End: 2023-07-18 | Stop reason: SDUPTHER

## 2023-06-20 DIAGNOSIS — G89.29 CHRONIC BILATERAL LOW BACK PAIN WITH BILATERAL SCIATICA: ICD-10-CM

## 2023-06-20 DIAGNOSIS — M54.41 CHRONIC BILATERAL LOW BACK PAIN WITH BILATERAL SCIATICA: ICD-10-CM

## 2023-06-20 DIAGNOSIS — M47.817 LUMBOSACRAL SPONDYLOSIS WITHOUT MYELOPATHY: Primary | ICD-10-CM

## 2023-06-20 DIAGNOSIS — M54.42 CHRONIC BILATERAL LOW BACK PAIN WITH BILATERAL SCIATICA: ICD-10-CM

## 2023-06-20 RX ORDER — TRIAMCINOLONE ACETONIDE 40 MG/ML
40 INJECTION, SUSPENSION INTRA-ARTICULAR; INTRAMUSCULAR ONCE
Status: COMPLETED | OUTPATIENT
Start: 2023-06-20 | End: 2023-06-20

## 2023-06-20 RX ADMIN — TRIAMCINOLONE ACETONIDE 40 MG: 40 INJECTION, SUSPENSION INTRA-ARTICULAR; INTRAMUSCULAR at 16:04

## 2023-07-05 ENCOUNTER — TELEPHONE (OUTPATIENT)
Dept: PAIN MANAGEMENT | Age: 53
End: 2023-07-05

## 2023-07-05 NOTE — TELEPHONE ENCOUNTER
Submitted PA for [facility]: [id number] STATUS: PENDING. Follow up done daily; if no response in three days we will refax for status check. If another three days goes by with no response we will call the insurance for status.

## 2023-07-13 ENCOUNTER — TELEPHONE (OUTPATIENT)
Dept: PAIN MANAGEMENT | Age: 53
End: 2023-07-13

## 2023-07-13 DIAGNOSIS — G89.29 CHRONIC BILATERAL LOW BACK PAIN WITH BILATERAL SCIATICA: ICD-10-CM

## 2023-07-13 DIAGNOSIS — S22.080S COMPRESSION FRACTURE OF T12 VERTEBRA, SEQUELA: ICD-10-CM

## 2023-07-13 DIAGNOSIS — M54.42 CHRONIC BILATERAL LOW BACK PAIN WITH BILATERAL SCIATICA: ICD-10-CM

## 2023-07-13 DIAGNOSIS — G89.29 CHRONIC PAIN OF LEFT ANKLE: ICD-10-CM

## 2023-07-13 DIAGNOSIS — M62.830 BACK SPASM: ICD-10-CM

## 2023-07-13 DIAGNOSIS — G89.4 CHRONIC PAIN SYNDROME: ICD-10-CM

## 2023-07-13 DIAGNOSIS — M25.572 CHRONIC PAIN OF LEFT ANKLE: ICD-10-CM

## 2023-07-13 DIAGNOSIS — M54.41 CHRONIC BILATERAL LOW BACK PAIN WITH BILATERAL SCIATICA: ICD-10-CM

## 2023-07-13 DIAGNOSIS — M47.817 LUMBOSACRAL SPONDYLOSIS WITHOUT MYELOPATHY: ICD-10-CM

## 2023-07-13 DIAGNOSIS — M54.2 CERVICALGIA: ICD-10-CM

## 2023-07-13 RX ORDER — HYDROCODONE BITARTRATE AND ACETAMINOPHEN 5; 325 MG/1; MG/1
1 TABLET ORAL EVERY 6 HOURS PRN
Qty: 24 TABLET | Refills: 0 | Status: SHIPPED | OUTPATIENT
Start: 2023-07-13 | End: 2023-07-18 | Stop reason: SDUPTHER

## 2023-07-13 NOTE — TELEPHONE ENCOUNTER
Pt said he is out of [facility] and his appointment isnt until 7/18.  He is wondering if an extension could be sent to get him through until his next FU          Please advise

## 2023-07-18 ENCOUNTER — OFFICE VISIT (OUTPATIENT)
Dept: PAIN MANAGEMENT | Age: 53
End: 2023-07-18
Payer: COMMERCIAL

## 2023-07-18 VITALS
WEIGHT: 219 LBS | BODY MASS INDEX: 30.66 KG/M2 | OXYGEN SATURATION: 96 % | DIASTOLIC BLOOD PRESSURE: 92 MMHG | SYSTOLIC BLOOD PRESSURE: 130 MMHG | HEIGHT: 71 IN | TEMPERATURE: 97.1 F | HEART RATE: 95 BPM

## 2023-07-18 DIAGNOSIS — G89.29 CHRONIC PAIN OF LEFT ANKLE: ICD-10-CM

## 2023-07-18 DIAGNOSIS — E66.09 CLASS 1 OBESITY DUE TO EXCESS CALORIES WITH SERIOUS COMORBIDITY IN ADULT, UNSPECIFIED BMI: ICD-10-CM

## 2023-07-18 DIAGNOSIS — G89.29 CHRONIC BILATERAL LOW BACK PAIN WITH BILATERAL SCIATICA: ICD-10-CM

## 2023-07-18 DIAGNOSIS — M54.2 CERVICALGIA: ICD-10-CM

## 2023-07-18 DIAGNOSIS — M25.572 CHRONIC PAIN OF LEFT ANKLE: ICD-10-CM

## 2023-07-18 DIAGNOSIS — G89.4 CHRONIC PAIN SYNDROME: ICD-10-CM

## 2023-07-18 DIAGNOSIS — M54.42 CHRONIC BILATERAL LOW BACK PAIN WITH BILATERAL SCIATICA: ICD-10-CM

## 2023-07-18 DIAGNOSIS — M54.41 CHRONIC BILATERAL LOW BACK PAIN WITH BILATERAL SCIATICA: ICD-10-CM

## 2023-07-18 DIAGNOSIS — S22.080S COMPRESSION FRACTURE OF T12 VERTEBRA, SEQUELA: ICD-10-CM

## 2023-07-18 DIAGNOSIS — M47.817 LUMBOSACRAL SPONDYLOSIS WITHOUT MYELOPATHY: ICD-10-CM

## 2023-07-18 DIAGNOSIS — M62.830 BACK SPASM: ICD-10-CM

## 2023-07-18 PROCEDURE — 99214 OFFICE O/P EST MOD 30 MIN: CPT | Performed by: NURSE PRACTITIONER

## 2023-07-18 PROCEDURE — 3017F COLORECTAL CA SCREEN DOC REV: CPT | Performed by: NURSE PRACTITIONER

## 2023-07-18 PROCEDURE — G8417 CALC BMI ABV UP PARAM F/U: HCPCS | Performed by: NURSE PRACTITIONER

## 2023-07-18 PROCEDURE — G8427 DOCREV CUR MEDS BY ELIG CLIN: HCPCS | Performed by: NURSE PRACTITIONER

## 2023-07-18 PROCEDURE — 4004F PT TOBACCO SCREEN RCVD TLK: CPT | Performed by: NURSE PRACTITIONER

## 2023-07-18 RX ORDER — HYDROCODONE BITARTRATE AND ACETAMINOPHEN 5; 325 MG/1; MG/1
1 TABLET ORAL EVERY 6 HOURS PRN
Qty: 112 TABLET | Refills: 0 | Status: SHIPPED | OUTPATIENT
Start: 2023-07-18 | End: 2023-08-15

## 2023-07-18 RX ORDER — GABAPENTIN 300 MG/1
300 CAPSULE ORAL NIGHTLY
Qty: 30 CAPSULE | Refills: 0 | Status: SHIPPED | OUTPATIENT
Start: 2023-07-18 | End: 2023-08-17

## 2023-07-18 RX ORDER — TRIAMCINOLONE ACETONIDE 40 MG/ML
40 INJECTION, SUSPENSION INTRA-ARTICULAR; INTRAMUSCULAR ONCE
Status: COMPLETED | OUTPATIENT
Start: 2023-06-13 | End: 2023-06-13

## 2023-07-18 RX ORDER — METHYLPREDNISOLONE 4 MG/1
TABLET ORAL
Qty: 1 KIT | Refills: 0 | Status: SHIPPED | OUTPATIENT
Start: 2023-07-18 | End: 2023-07-24

## 2023-07-18 RX ORDER — IBUPROFEN 800 MG/1
800 TABLET ORAL EVERY 8 HOURS PRN
Qty: 90 TABLET | Refills: 0 | Status: SHIPPED | OUTPATIENT
Start: 2023-07-18 | End: 2023-08-17

## 2023-07-18 NOTE — PROGRESS NOTES
[facility]  1970  [id number]    HISTORY OF PRESENT ILLNESS: Mr. Negrita Clarke is a 48 y.o. male returns for a follow up visit for pain management  He has a diagnosis of   1. Chronic pain syndrome    2. Compression fracture of T12 vertebra, sequela    3. Lumbosacral spondylosis without myelopathy    4. Chronic bilateral low back pain with bilateral sciatica    5. Back spasm    6. Cervicalgia    7. Chronic pain of left ankle    8. Class 1 obesity due to excess calories with serious comorbidity in adult, unspecified BMI      As per Information Obtained from the PADT (Patient Assessment and Documentation Tool)    He complains of pain in the  upper mid and lower back, both shoulders, both legs both feet  He rates the pain 8/10 and describes it as aching. Current treatment regimen has helped relieve about 30% of the pain. He denies any side effects from the current pain regimen. Patient reports that since the last follow up visit the physical functioning is unchanged, family/social relationships are unchanged, mood is unchanged sleep patterns are unchanged, and that the overall functioning is unchanged. Patient denies misusing/abusing his narcotic pain medications or using any illegal drugs. Upon obtaining medical history from Mr. Negrita Clarke states that pain is manageable on current pain therapy. Takes the pain medications as prescribed. Tenderness to the legs/thighs. Mood/anxiety is stable. Sleep is fair with an average of 5-6 hours. Denies to having issues of constipation. Tolerating activities/house chores with moderate tenderness to the lower back. Working on smoking cessation    ALLERGIES: Patients list of allergies were reviewed     MEDICATIONS: Mr. Negrita Clarke list of medications were reviewed. His current medications are   Outpatient Medications Prior to Visit   Medication Sig Dispense Refill    HYDROcodone-acetaminophen (NORCO) 5-325 MG per tablet Take 1 tablet by mouth every 6 hours as needed

## 2023-08-15 ENCOUNTER — OFFICE VISIT (OUTPATIENT)
Dept: PAIN MANAGEMENT | Age: 53
End: 2023-08-15

## 2023-08-15 VITALS
DIASTOLIC BLOOD PRESSURE: 95 MMHG | HEART RATE: 98 BPM | TEMPERATURE: 96.8 F | OXYGEN SATURATION: 96 % | SYSTOLIC BLOOD PRESSURE: 136 MMHG

## 2023-08-15 DIAGNOSIS — S22.080S COMPRESSION FRACTURE OF T12 VERTEBRA, SEQUELA: ICD-10-CM

## 2023-08-15 DIAGNOSIS — M54.42 CHRONIC BILATERAL LOW BACK PAIN WITH BILATERAL SCIATICA: ICD-10-CM

## 2023-08-15 DIAGNOSIS — G89.29 CHRONIC BILATERAL LOW BACK PAIN WITH BILATERAL SCIATICA: ICD-10-CM

## 2023-08-15 DIAGNOSIS — M25.572 CHRONIC PAIN OF LEFT ANKLE: ICD-10-CM

## 2023-08-15 DIAGNOSIS — M62.830 BACK SPASM: ICD-10-CM

## 2023-08-15 DIAGNOSIS — M47.817 LUMBOSACRAL SPONDYLOSIS WITHOUT MYELOPATHY: ICD-10-CM

## 2023-08-15 DIAGNOSIS — M54.41 CHRONIC BILATERAL LOW BACK PAIN WITH BILATERAL SCIATICA: ICD-10-CM

## 2023-08-15 DIAGNOSIS — M54.2 CERVICALGIA: ICD-10-CM

## 2023-08-15 DIAGNOSIS — G89.4 CHRONIC PAIN SYNDROME: ICD-10-CM

## 2023-08-15 DIAGNOSIS — E66.09 CLASS 1 OBESITY DUE TO EXCESS CALORIES WITH SERIOUS COMORBIDITY IN ADULT, UNSPECIFIED BMI: ICD-10-CM

## 2023-08-15 DIAGNOSIS — G89.29 CHRONIC PAIN OF LEFT ANKLE: ICD-10-CM

## 2023-08-15 RX ORDER — GABAPENTIN 300 MG/1
300 CAPSULE ORAL NIGHTLY
Qty: 30 CAPSULE | Refills: 0 | Status: SHIPPED | OUTPATIENT
Start: 2023-08-15 | End: 2023-09-14

## 2023-08-15 RX ORDER — HYDROCODONE BITARTRATE AND ACETAMINOPHEN 5; 325 MG/1; MG/1
1 TABLET ORAL EVERY 6 HOURS PRN
Qty: 112 TABLET | Refills: 0 | Status: SHIPPED | OUTPATIENT
Start: 2023-08-15 | End: 2023-09-12

## 2023-08-15 RX ORDER — NALOXONE HYDROCHLORIDE 4 MG/.1ML
SPRAY NASAL
Qty: 1 EACH | Refills: 0 | Status: SHIPPED | OUTPATIENT
Start: 2023-08-15

## 2023-08-15 RX ORDER — IBUPROFEN 800 MG/1
800 TABLET ORAL EVERY 8 HOURS PRN
Qty: 90 TABLET | Refills: 0 | Status: SHIPPED | OUTPATIENT
Start: 2023-08-15 | End: 2023-09-14

## 2023-08-15 NOTE — PROGRESS NOTES
Shaheed Emily III  1970  [id number]    HISTORY OF PRESENT ILLNESS: Mr. Yoselin Love is a 48 y.o. male returns for a follow up visit for pain management  He has a diagnosis of   1. Chronic pain syndrome    2. Lumbosacral spondylosis without myelopathy    3. Chronic bilateral low back pain with bilateral sciatica    4. Compression fracture of T12 vertebra, sequela    5. Back spasm    6. Cervicalgia    7. Chronic pain of left ankle    8. Class 1 obesity due to excess calories with serious comorbidity in adult, unspecified BMI      As per Information Obtained from the PADT (Patient Assessment and Documentation Tool)    He complains of pain in the  upper mid and lower back, both legs, both feet  He rates the pain 9/10 and describes it as aching. Current treatment regimen has helped relieve about 20% of the pain. He denies any side effects from the current pain regimen. Patient reports that since the last follow up visit the physical functioning is worse, family/social relationships are unchanged, mood is unchanged sleep patterns are worse, and that the overall functioning is worse. Patient denies misusing/abusing his narcotic pain medications or using any illegal drugs. Upon obtaining medical history from Mr. Yoselin Love states that pain is somewhat manageable on current pain therapy. Takes the pain medications as prescribed. Reports having tenderness mainly to the lower back. Mood/anxiety is stable. Sleep is fair with an average of 5-6 hours. Denies to having issues of constipation. Tolerating activities/house chores with moderate tenderness to the lower back. Working on smoking cessation    ALLERGIES: Patients list of allergies were reviewed     MEDICATIONS: Mr. Yoselin Love list of medications were reviewed. His current medications are   Outpatient Medications Prior to Visit   Medication Sig Dispense Refill    HYDROcodone-acetaminophen (NORCO) 5-325 MG per tablet Take 1 tablet by mouth every 6 hours as

## 2023-09-01 ENCOUNTER — TELEPHONE (OUTPATIENT)
Dept: ORTHOPEDIC SURGERY | Age: 53
End: 2023-09-01

## 2023-09-12 ENCOUNTER — OFFICE VISIT (OUTPATIENT)
Dept: PAIN MANAGEMENT | Age: 53
End: 2023-09-12

## 2023-09-12 VITALS
BODY MASS INDEX: 31.08 KG/M2 | WEIGHT: 222 LBS | SYSTOLIC BLOOD PRESSURE: 139 MMHG | TEMPERATURE: 97.2 F | DIASTOLIC BLOOD PRESSURE: 93 MMHG | OXYGEN SATURATION: 95 % | HEART RATE: 122 BPM | HEIGHT: 71 IN

## 2023-09-12 DIAGNOSIS — S22.080S COMPRESSION FRACTURE OF T12 VERTEBRA, SEQUELA: ICD-10-CM

## 2023-09-12 DIAGNOSIS — G89.29 CHRONIC BILATERAL LOW BACK PAIN WITH BILATERAL SCIATICA: ICD-10-CM

## 2023-09-12 DIAGNOSIS — M54.42 CHRONIC BILATERAL LOW BACK PAIN WITH BILATERAL SCIATICA: ICD-10-CM

## 2023-09-12 DIAGNOSIS — M54.2 CERVICALGIA: ICD-10-CM

## 2023-09-12 DIAGNOSIS — M54.41 CHRONIC BILATERAL LOW BACK PAIN WITH BILATERAL SCIATICA: ICD-10-CM

## 2023-09-12 DIAGNOSIS — M25.572 CHRONIC PAIN OF LEFT ANKLE: ICD-10-CM

## 2023-09-12 DIAGNOSIS — M47.817 LUMBOSACRAL SPONDYLOSIS WITHOUT MYELOPATHY: ICD-10-CM

## 2023-09-12 DIAGNOSIS — G89.4 CHRONIC PAIN SYNDROME: ICD-10-CM

## 2023-09-12 DIAGNOSIS — M62.830 BACK SPASM: ICD-10-CM

## 2023-09-12 DIAGNOSIS — G89.29 CHRONIC PAIN OF LEFT ANKLE: ICD-10-CM

## 2023-09-12 DIAGNOSIS — E66.09 CLASS 1 OBESITY DUE TO EXCESS CALORIES WITH SERIOUS COMORBIDITY IN ADULT, UNSPECIFIED BMI: ICD-10-CM

## 2023-09-12 PROCEDURE — 99214 OFFICE O/P EST MOD 30 MIN: CPT | Performed by: NURSE PRACTITIONER

## 2023-09-12 RX ORDER — METHYLPREDNISOLONE 4 MG/1
TABLET ORAL
Qty: 1 KIT | Refills: 0 | Status: SHIPPED | OUTPATIENT
Start: 2023-09-12 | End: 2023-09-18

## 2023-09-12 RX ORDER — HYDROCODONE BITARTRATE AND ACETAMINOPHEN 5; 325 MG/1; MG/1
1 TABLET ORAL EVERY 6 HOURS PRN
Qty: 112 TABLET | Refills: 0 | Status: SHIPPED | OUTPATIENT
Start: 2023-09-12 | End: 2023-10-10

## 2023-09-12 RX ORDER — GABAPENTIN 300 MG/1
300 CAPSULE ORAL NIGHTLY
Qty: 30 CAPSULE | Refills: 0 | Status: SHIPPED | OUTPATIENT
Start: 2023-09-12 | End: 2023-10-12

## 2023-09-12 RX ORDER — IBUPROFEN 800 MG/1
800 TABLET ORAL EVERY 8 HOURS PRN
Qty: 90 TABLET | Refills: 0 | Status: SHIPPED | OUTPATIENT
Start: 2023-09-12 | End: 2023-10-12

## 2023-09-12 NOTE — PROGRESS NOTES
Gris Bailondle III  1970  [id number]    HISTORY OF PRESENT ILLNESS: Mr. Ghassan Anderson is a 48 y.o. male returns for a follow up visit for pain management  He has a diagnosis of   1. Chronic pain syndrome    2. Lumbosacral spondylosis without myelopathy    3. Chronic bilateral low back pain with bilateral sciatica    4. Compression fracture of T12 vertebra, sequela    5. Back spasm    6. Chronic pain of left ankle    7. Cervicalgia    8. Class 1 obesity due to excess calories with serious comorbidity in adult, unspecified BMI      As per Information Obtained from the PADT (Patient Assessment and Documentation Tool)    He complains of pain in the  upper and lower back, both legs  He rates the pain 9/10 and describes it as aching, burning. Current treatment regimen has helped relieve about 30% of the pain. He denies any side effects from the current pain regimen. Patient reports that since the last follow up visit the physical functioning is unchanged, family/social relationships are unchanged, mood is unchanged sleep patterns are unchanged, and that the overall functioning is unchanged. Patient denies misusing/abusing his narcotic pain medications or using any illegal drugs. Upon obtaining medical history from Mr. Ghassan Anderson states that pain is manageable on current pain therapy. Takes the pain medications as prescribed. Aching due to the cold weather, requesting for Medrol dose pack. Mood/anxiety is stable. Sleep is fair with an average of 5-6 hours. Denies to having issues of constipation. Tolerating activities/house chores with moderate tenderness to the lower back. ALLERGIES: Patients list of allergies were reviewed     MEDICATIONS: Mr. Ghassan Anderson list of medications were reviewed. His current medications are   Outpatient Medications Prior to Visit   Medication Sig Dispense Refill    naloxone (NARCAN) 4 MG/0.1ML LIQD nasal spray Use as directed 1 each 0    HYDROcodone-acetaminophen (NORCO) 5-325 MG

## 2023-10-10 ENCOUNTER — TELEPHONE (OUTPATIENT)
Dept: PAIN MANAGEMENT | Age: 53
End: 2023-10-10

## 2023-10-10 DIAGNOSIS — G89.29 CHRONIC PAIN OF LEFT ANKLE: ICD-10-CM

## 2023-10-10 DIAGNOSIS — M25.572 CHRONIC PAIN OF LEFT ANKLE: ICD-10-CM

## 2023-10-10 DIAGNOSIS — M62.830 BACK SPASM: ICD-10-CM

## 2023-10-10 DIAGNOSIS — M54.42 CHRONIC BILATERAL LOW BACK PAIN WITH BILATERAL SCIATICA: ICD-10-CM

## 2023-10-10 DIAGNOSIS — M54.2 CERVICALGIA: ICD-10-CM

## 2023-10-10 DIAGNOSIS — G89.4 CHRONIC PAIN SYNDROME: ICD-10-CM

## 2023-10-10 DIAGNOSIS — G89.29 CHRONIC BILATERAL LOW BACK PAIN WITH BILATERAL SCIATICA: ICD-10-CM

## 2023-10-10 DIAGNOSIS — S22.080S COMPRESSION FRACTURE OF T12 VERTEBRA, SEQUELA: ICD-10-CM

## 2023-10-10 DIAGNOSIS — M47.817 LUMBOSACRAL SPONDYLOSIS WITHOUT MYELOPATHY: ICD-10-CM

## 2023-10-10 DIAGNOSIS — M54.41 CHRONIC BILATERAL LOW BACK PAIN WITH BILATERAL SCIATICA: ICD-10-CM

## 2023-10-10 RX ORDER — HYDROCODONE BITARTRATE AND ACETAMINOPHEN 5; 325 MG/1; MG/1
1 TABLET ORAL EVERY 6 HOURS PRN
Qty: 36 TABLET | Refills: 0 | Status: SHIPPED | OUTPATIENT
Start: 2023-10-10 | End: 2023-10-17 | Stop reason: SDUPTHER

## 2023-10-10 NOTE — TELEPHONE ENCOUNTER
Patient was here today for what he thought was his appt. He needs his extension sent to [facility] on [address]. He needs his vicodin 5/500 QID and lidocaine patches. He is very upset due to the fact that he thought his appt was today even though in the computer it states his next appt is 10/17.

## 2023-10-17 ENCOUNTER — OFFICE VISIT (OUTPATIENT)
Dept: PAIN MANAGEMENT | Age: 53
End: 2023-10-17
Payer: COMMERCIAL

## 2023-10-17 VITALS
HEART RATE: 85 BPM | DIASTOLIC BLOOD PRESSURE: 92 MMHG | TEMPERATURE: 97.2 F | OXYGEN SATURATION: 96 % | SYSTOLIC BLOOD PRESSURE: 130 MMHG

## 2023-10-17 DIAGNOSIS — M25.572 CHRONIC PAIN OF LEFT ANKLE: ICD-10-CM

## 2023-10-17 DIAGNOSIS — M47.817 LUMBOSACRAL SPONDYLOSIS WITHOUT MYELOPATHY: ICD-10-CM

## 2023-10-17 DIAGNOSIS — S22.080S COMPRESSION FRACTURE OF T12 VERTEBRA, SEQUELA: ICD-10-CM

## 2023-10-17 DIAGNOSIS — M54.42 CHRONIC BILATERAL LOW BACK PAIN WITH BILATERAL SCIATICA: ICD-10-CM

## 2023-10-17 DIAGNOSIS — E66.09 CLASS 1 OBESITY DUE TO EXCESS CALORIES WITH SERIOUS COMORBIDITY IN ADULT, UNSPECIFIED BMI: ICD-10-CM

## 2023-10-17 DIAGNOSIS — M54.41 CHRONIC BILATERAL LOW BACK PAIN WITH BILATERAL SCIATICA: ICD-10-CM

## 2023-10-17 DIAGNOSIS — M54.2 CERVICALGIA: ICD-10-CM

## 2023-10-17 DIAGNOSIS — G89.29 CHRONIC BILATERAL LOW BACK PAIN WITH BILATERAL SCIATICA: ICD-10-CM

## 2023-10-17 DIAGNOSIS — G89.4 CHRONIC PAIN SYNDROME: ICD-10-CM

## 2023-10-17 DIAGNOSIS — G89.29 CHRONIC PAIN OF LEFT ANKLE: ICD-10-CM

## 2023-10-17 DIAGNOSIS — M62.830 BACK SPASM: ICD-10-CM

## 2023-10-17 PROCEDURE — 99214 OFFICE O/P EST MOD 30 MIN: CPT | Performed by: NURSE PRACTITIONER

## 2023-10-17 PROCEDURE — G8417 CALC BMI ABV UP PARAM F/U: HCPCS | Performed by: NURSE PRACTITIONER

## 2023-10-17 PROCEDURE — 3017F COLORECTAL CA SCREEN DOC REV: CPT | Performed by: NURSE PRACTITIONER

## 2023-10-17 PROCEDURE — G8484 FLU IMMUNIZE NO ADMIN: HCPCS | Performed by: NURSE PRACTITIONER

## 2023-10-17 PROCEDURE — G8427 DOCREV CUR MEDS BY ELIG CLIN: HCPCS | Performed by: NURSE PRACTITIONER

## 2023-10-17 PROCEDURE — 4004F PT TOBACCO SCREEN RCVD TLK: CPT | Performed by: NURSE PRACTITIONER

## 2023-10-17 RX ORDER — IBUPROFEN 800 MG/1
800 TABLET ORAL EVERY 8 HOURS PRN
Qty: 90 TABLET | Refills: 0 | Status: SHIPPED | OUTPATIENT
Start: 2023-10-17 | End: 2023-11-16

## 2023-10-17 RX ORDER — GABAPENTIN 300 MG/1
300 CAPSULE ORAL NIGHTLY
Qty: 30 CAPSULE | Refills: 0 | Status: SHIPPED | OUTPATIENT
Start: 2023-10-17 | End: 2023-11-16

## 2023-10-17 RX ORDER — HYDROCODONE BITARTRATE AND ACETAMINOPHEN 5; 325 MG/1; MG/1
1 TABLET ORAL EVERY 6 HOURS PRN
Qty: 112 TABLET | Refills: 0 | Status: SHIPPED | OUTPATIENT
Start: 2023-10-17 | End: 2023-11-14

## 2023-10-17 NOTE — PROGRESS NOTES
Rod Mita III  1970  [id number]    HISTORY OF PRESENT ILLNESS: Mr. Deisy Baer is a 48 y.o. male returns for a follow up visit for pain management  He has a diagnosis of   1. Chronic pain syndrome    2. Lumbosacral spondylosis without myelopathy    3. Chronic bilateral low back pain with bilateral sciatica    4. Compression fracture of T12 vertebra, sequela    5. Back spasm    6. Chronic pain of left ankle    7. Cervicalgia    8. Class 1 obesity due to excess calories with serious comorbidity in adult, unspecified BMI      As per Information Obtained from the PADT (Patient Assessment and Documentation Tool)    He complains of pain in the  upper and lower back, both leg, both feet  He rates the pain 9/10 and describes it as aching. Current treatment regimen has helped relieve about 20% of the pain. He denies any side effects from the current pain regimen. Patient reports that since the last follow up visit the physical functioning is worse, family/social relationships are unchanged, mood is worse sleep patterns are worse, and that the overall functioning is worse. Patient denies misusing/abusing his narcotic pain medications or using any illegal drugs. Upon obtaining medical history from Mr. Deisy Baer states that pain is somewhat manageable on current pain therapy. Takes the pain medications as prescribed. Patient has still had pain in the lower back, requesting for referral to orthopedics spine for possible surgery. Mood/anxiety is stable. Sleep is fair with an average of 5-6 hours. Denies to having issues of constipation. Tolerating activities/house chores with moderate tenderness to the lower back. ALLERGIES: Patients list of allergies were reviewed     MEDICATIONS: Mr. Deisy Baer list of medications were reviewed. His current medications are   Outpatient Medications Prior to Visit   Medication Sig Dispense Refill    naloxone (NARCAN) 4 MG/0.1ML LIQD nasal spray Use as directed 1 each 0

## 2023-11-14 ENCOUNTER — OFFICE VISIT (OUTPATIENT)
Dept: ORTHOPEDIC SURGERY | Age: 53
End: 2023-11-14
Payer: COMMERCIAL

## 2023-11-14 ENCOUNTER — OFFICE VISIT (OUTPATIENT)
Dept: PAIN MANAGEMENT | Age: 53
End: 2023-11-14

## 2023-11-14 VITALS
HEART RATE: 125 BPM | BODY MASS INDEX: 31.24 KG/M2 | OXYGEN SATURATION: 97 % | SYSTOLIC BLOOD PRESSURE: 159 MMHG | TEMPERATURE: 97.7 F | DIASTOLIC BLOOD PRESSURE: 104 MMHG | WEIGHT: 224 LBS

## 2023-11-14 VITALS — BODY MASS INDEX: 31.08 KG/M2 | WEIGHT: 222 LBS | HEIGHT: 71 IN

## 2023-11-14 DIAGNOSIS — S22.080S COMPRESSION FRACTURE OF T12 VERTEBRA, SEQUELA: ICD-10-CM

## 2023-11-14 DIAGNOSIS — M47.816 LUMBAR SPONDYLOSIS: Primary | ICD-10-CM

## 2023-11-14 DIAGNOSIS — G89.29 CHRONIC BILATERAL LOW BACK PAIN WITH BILATERAL SCIATICA: ICD-10-CM

## 2023-11-14 DIAGNOSIS — M54.42 CHRONIC BILATERAL LOW BACK PAIN WITH BILATERAL SCIATICA: ICD-10-CM

## 2023-11-14 DIAGNOSIS — M54.41 CHRONIC BILATERAL LOW BACK PAIN WITH BILATERAL SCIATICA: ICD-10-CM

## 2023-11-14 DIAGNOSIS — M62.830 BACK SPASM: ICD-10-CM

## 2023-11-14 DIAGNOSIS — G89.29 CHRONIC PAIN OF LEFT ANKLE: ICD-10-CM

## 2023-11-14 DIAGNOSIS — M25.572 CHRONIC PAIN OF LEFT ANKLE: ICD-10-CM

## 2023-11-14 DIAGNOSIS — M54.2 CERVICALGIA: ICD-10-CM

## 2023-11-14 DIAGNOSIS — M47.817 LUMBOSACRAL SPONDYLOSIS WITHOUT MYELOPATHY: ICD-10-CM

## 2023-11-14 DIAGNOSIS — E66.09 CLASS 1 OBESITY DUE TO EXCESS CALORIES WITH SERIOUS COMORBIDITY IN ADULT, UNSPECIFIED BMI: ICD-10-CM

## 2023-11-14 DIAGNOSIS — G89.4 CHRONIC PAIN SYNDROME: ICD-10-CM

## 2023-11-14 PROCEDURE — 3017F COLORECTAL CA SCREEN DOC REV: CPT | Performed by: ORTHOPAEDIC SURGERY

## 2023-11-14 PROCEDURE — G8427 DOCREV CUR MEDS BY ELIG CLIN: HCPCS | Performed by: ORTHOPAEDIC SURGERY

## 2023-11-14 PROCEDURE — 4004F PT TOBACCO SCREEN RCVD TLK: CPT | Performed by: ORTHOPAEDIC SURGERY

## 2023-11-14 PROCEDURE — G8484 FLU IMMUNIZE NO ADMIN: HCPCS | Performed by: ORTHOPAEDIC SURGERY

## 2023-11-14 PROCEDURE — G8417 CALC BMI ABV UP PARAM F/U: HCPCS | Performed by: ORTHOPAEDIC SURGERY

## 2023-11-14 PROCEDURE — 99203 OFFICE O/P NEW LOW 30 MIN: CPT | Performed by: ORTHOPAEDIC SURGERY

## 2023-11-14 RX ORDER — HYDROCODONE BITARTRATE AND ACETAMINOPHEN 5; 325 MG/1; MG/1
1 TABLET ORAL EVERY 6 HOURS PRN
Qty: 112 TABLET | Refills: 0 | Status: SHIPPED | OUTPATIENT
Start: 2023-11-14 | End: 2023-12-12

## 2023-11-14 RX ORDER — IBUPROFEN 800 MG/1
800 TABLET ORAL EVERY 8 HOURS PRN
Qty: 90 TABLET | Refills: 0 | Status: SHIPPED | OUTPATIENT
Start: 2023-11-14 | End: 2023-12-14

## 2023-11-14 RX ORDER — GABAPENTIN 300 MG/1
300 CAPSULE ORAL NIGHTLY
Qty: 30 CAPSULE | Refills: 0 | Status: SHIPPED | OUTPATIENT
Start: 2023-11-14 | End: 2023-12-14

## 2023-11-14 NOTE — PROGRESS NOTES
New Patient: LUMBAR SPINE    Referring Provider:  MARGARITA Matos*    CHIEF COMPLAINT: Low back pain    HISTORY OF PRESENT ILLNESS:    Mr. Arvis Homans III  is a pleasant 48 y.o. today for evaluation of low back and bilateral leg pain. The symptoms began after lifting 40 years ago. He rates his pain 9/10. He notes numbness and tingling and weakness of his legs. .  He denies saddle anesthesia and bowel or bladder dysfunction. Current/Past Treatment:   Physical Therapy: yes  Chiropractic:  no   Injection: Yes  Medications: Gabapentin, Motrin and Norco    Past Medical History:   No past medical history on file. Past Surgical History:     Past Surgical History:   Procedure Laterality Date    PAIN MANAGEMENT PROCEDURE Bilateral 11/10/2021    BILATERAL L5 TRANSFORAMINAL EPIDURAL STEROID INJECTION WITH FLUOROSCOPY performed by Dominga Dubois MD at [facility]     Current Medications:     Current Outpatient Medications:     gabapentin (NEURONTIN) 300 MG capsule, Take 1 capsule by mouth at bedtime for 30 days. , Disp: 30 capsule, Rfl: 0    ibuprofen (ADVIL;MOTRIN) 800 MG tablet, Take 1 tablet by mouth every 8 hours as needed for Pain, Disp: 90 tablet, Rfl: 0    HYDROcodone-acetaminophen (NORCO) 5-325 MG per tablet, Take 1 tablet by mouth every 6 hours as needed for Pain for up to 28 days. Intended supply: 3 days. Take lowest dose possible to manage pain Max Daily Amount: 4 tablets, Disp: 112 tablet, Rfl: 0    Lidocaine 1.8 % PTCH, Apply 1 patch topically daily, Disp: 30 patch, Rfl: 0    naloxone (NARCAN) 4 MG/0.1ML LIQD nasal spray, Use as directed, Disp: 1 each, Rfl: 0    aspirin EC 81 MG EC tablet, Take 1 tablet by mouth daily, Disp: 30 tablet, Rfl: 5  Allergies:  Butrans [buprenorphine] and Codeine  Social History:    reports that he has been smoking cigarettes. He has a 25.00 pack-year smoking history. He has never used smokeless tobacco. He reports current alcohol use.  He reports that he does not use

## 2023-11-14 NOTE — PROGRESS NOTES
Manuel Michelle III  1970  [id number]    HISTORY OF PRESENT ILLNESS: Mr. Dianelys Camacho is a 48 y.o. male returns for a follow up visit for pain management  He has a diagnosis of   1. Chronic pain syndrome    2. Lumbosacral spondylosis without myelopathy    3. Chronic bilateral low back pain with bilateral sciatica    4. Compression fracture of T12 vertebra, sequela    5. Back spasm    6. Cervicalgia    7. Chronic pain of left ankle    8. Class 1 obesity due to excess calories with serious comorbidity in adult, unspecified BMI      As per Information Obtained from the PADT (Patient Assessment and Documentation Tool)    He complains of pain in the down the spine, both legs, both feet He rates the pain 8/10 and describes it as aching. Current treatment regimen has helped relieve about 20% of the pain. He denies any side effects from the current pain regimen. Patient reports that since the last follow up visit the physical functioning is unchanged, family/social relationships are unchanged, mood is unchanged sleep patterns are unchanged, and that the overall functioning is unchanged. Patient denies misusing/abusing his narcotic pain medications or using any illegal drugs. Upon obtaining medical history from Mr. Dianelys Camacho states that pain is manageable on current pain therapy. Takes the pain medications as prescribed. Was evaluated by Dr. Deejay Saab who did not recommend surgical intervention to the lumbar spine. He recommended injections which patient declined as he notes they failed in the past. He requested for TPI of the Lumbar spine as he believes they help better with pain. Mood/anxiety is stable. Sleep is fair with an average of 5-6 hours. Denies to having issues of constipation. Tolerating activities/house chores with moderate tenderness to the lower back. ALLERGIES: Patients list of allergies were reviewed     MEDICATIONS: Mr. Dianelys Camacho list of medications were reviewed. His current medications are

## 2023-11-15 RX ORDER — TRIAMCINOLONE ACETONIDE 40 MG/ML
40 INJECTION, SUSPENSION INTRA-ARTICULAR; INTRAMUSCULAR ONCE
Status: COMPLETED | OUTPATIENT
Start: 2023-11-15 | End: 2023-11-15

## 2023-11-15 RX ADMIN — TRIAMCINOLONE ACETONIDE 40 MG: 40 INJECTION, SUSPENSION INTRA-ARTICULAR; INTRAMUSCULAR at 12:24

## 2023-12-12 ENCOUNTER — OFFICE VISIT (OUTPATIENT)
Dept: PAIN MANAGEMENT | Age: 53
End: 2023-12-12
Payer: COMMERCIAL

## 2023-12-12 VITALS
OXYGEN SATURATION: 97 % | DIASTOLIC BLOOD PRESSURE: 93 MMHG | HEART RATE: 101 BPM | TEMPERATURE: 97.2 F | SYSTOLIC BLOOD PRESSURE: 146 MMHG

## 2023-12-12 DIAGNOSIS — M25.572 CHRONIC PAIN OF LEFT ANKLE: ICD-10-CM

## 2023-12-12 DIAGNOSIS — M62.830 BACK SPASM: ICD-10-CM

## 2023-12-12 DIAGNOSIS — G89.29 CHRONIC PAIN OF LEFT ANKLE: ICD-10-CM

## 2023-12-12 DIAGNOSIS — M47.817 LUMBOSACRAL SPONDYLOSIS WITHOUT MYELOPATHY: ICD-10-CM

## 2023-12-12 DIAGNOSIS — M54.41 CHRONIC BILATERAL LOW BACK PAIN WITH BILATERAL SCIATICA: ICD-10-CM

## 2023-12-12 DIAGNOSIS — G89.4 CHRONIC PAIN SYNDROME: ICD-10-CM

## 2023-12-12 DIAGNOSIS — M54.42 CHRONIC BILATERAL LOW BACK PAIN WITH BILATERAL SCIATICA: ICD-10-CM

## 2023-12-12 DIAGNOSIS — G89.29 CHRONIC BILATERAL LOW BACK PAIN WITH BILATERAL SCIATICA: ICD-10-CM

## 2023-12-12 DIAGNOSIS — E66.09 CLASS 1 OBESITY DUE TO EXCESS CALORIES WITHOUT SERIOUS COMORBIDITY IN ADULT, UNSPECIFIED BMI: ICD-10-CM

## 2023-12-12 DIAGNOSIS — M54.2 CERVICALGIA: ICD-10-CM

## 2023-12-12 DIAGNOSIS — S22.080S COMPRESSION FRACTURE OF T12 VERTEBRA, SEQUELA: ICD-10-CM

## 2023-12-12 PROCEDURE — 99214 OFFICE O/P EST MOD 30 MIN: CPT | Performed by: NURSE PRACTITIONER

## 2023-12-12 PROCEDURE — 4004F PT TOBACCO SCREEN RCVD TLK: CPT | Performed by: NURSE PRACTITIONER

## 2023-12-12 PROCEDURE — 3017F COLORECTAL CA SCREEN DOC REV: CPT | Performed by: NURSE PRACTITIONER

## 2023-12-12 PROCEDURE — G8427 DOCREV CUR MEDS BY ELIG CLIN: HCPCS | Performed by: NURSE PRACTITIONER

## 2023-12-12 PROCEDURE — G8484 FLU IMMUNIZE NO ADMIN: HCPCS | Performed by: NURSE PRACTITIONER

## 2023-12-12 PROCEDURE — G8417 CALC BMI ABV UP PARAM F/U: HCPCS | Performed by: NURSE PRACTITIONER

## 2023-12-12 RX ORDER — GABAPENTIN 300 MG/1
300 CAPSULE ORAL NIGHTLY
Qty: 30 CAPSULE | Refills: 0 | Status: SHIPPED | OUTPATIENT
Start: 2023-12-12 | End: 2024-01-11

## 2023-12-12 RX ORDER — METHYLPREDNISOLONE 4 MG/1
TABLET ORAL
Qty: 1 KIT | Refills: 0 | Status: SHIPPED | OUTPATIENT
Start: 2023-12-12 | End: 2023-12-18

## 2023-12-12 RX ORDER — HYDROCODONE BITARTRATE AND ACETAMINOPHEN 5; 325 MG/1; MG/1
1 TABLET ORAL EVERY 6 HOURS PRN
Qty: 112 TABLET | Refills: 0 | Status: SHIPPED | OUTPATIENT
Start: 2023-12-12 | End: 2024-01-09

## 2023-12-12 NOTE — PROGRESS NOTES
factors - (mood) Stable              Date of Last Medication Agreement: 1/10/23              Date Naloxone prescribed: 8/15/23              UDT:                          Date of last UDT: 6/13/23                          Adverse report: No              OARRS:                          Checked today: Yes                          Adverse report: No    IMPRESSION:   1. Chronic pain syndrome    2. Lumbosacral spondylosis without myelopathy    3. Chronic bilateral low back pain with bilateral sciatica    4. Compression fracture of T12 vertebra, sequela    5. Back spasm    6. Cervicalgia    7. Chronic pain of left ankle    8. Class 1 obesity due to excess calories without serious comorbidity in adult, unspecified BMI      PLAN:  Informed verbal consent was obtained:  -Patient's opioid therapy will be maintained at current dose  -medrol dose pack  -Home exercises/Sobeida exercises recommended  -CBT techniques- relaxation therapies such as biofeedback, mindfulness based stress reduction, imagery, cognitive restructuring, problem solving discussed with patient   -He was advised weight reduction, diet changes- 800-1200 jen diet, diet diary, exercising, nutritional  consult increased physical activity as tolerated   -Last UDS 6/13/23 consistent  -Return in about 4 weeks (around 1/9/2024). Analgesic Plan:              Continue present regimen: Yes: Norco 5-325 mg tabs orally q6h prn              Adjust dose of present analgesic: No              Switch analgesics: No              Add/Adjust concomitant therapy: No: continue with Motrin, Narcan, Neurontin, Lidocaine    I will continue his current medication regimen  which is part of the above treatment schedule.  It has been helping with Mr. Rohit Aguiar chronic  medical problems which for this visit include:   Diagnoses of Chronic pain syndrome, Lumbosacral spondylosis without myelopathy, Chronic bilateral low back pain with bilateral sciatica, Compression fracture of T12

## 2024-01-08 ENCOUNTER — TELEPHONE (OUTPATIENT)
Dept: PAIN MANAGEMENT | Age: 54
End: 2024-01-08

## 2024-01-08 NOTE — TELEPHONE ENCOUNTER
PATIENT WILL BE OUT OF MEDICATION TOMORROW.  And is asking that PKA send in a script.         Please advice

## 2024-01-09 DIAGNOSIS — M54.41 CHRONIC BILATERAL LOW BACK PAIN WITH BILATERAL SCIATICA: ICD-10-CM

## 2024-01-09 DIAGNOSIS — M47.817 LUMBOSACRAL SPONDYLOSIS WITHOUT MYELOPATHY: ICD-10-CM

## 2024-01-09 DIAGNOSIS — M54.42 CHRONIC BILATERAL LOW BACK PAIN WITH BILATERAL SCIATICA: ICD-10-CM

## 2024-01-09 DIAGNOSIS — M25.572 CHRONIC PAIN OF LEFT ANKLE: ICD-10-CM

## 2024-01-09 DIAGNOSIS — M54.2 CERVICALGIA: ICD-10-CM

## 2024-01-09 DIAGNOSIS — G89.4 CHRONIC PAIN SYNDROME: ICD-10-CM

## 2024-01-09 DIAGNOSIS — G89.29 CHRONIC PAIN OF LEFT ANKLE: ICD-10-CM

## 2024-01-09 DIAGNOSIS — S22.080S COMPRESSION FRACTURE OF T12 VERTEBRA, SEQUELA: ICD-10-CM

## 2024-01-09 DIAGNOSIS — M62.830 BACK SPASM: ICD-10-CM

## 2024-01-09 DIAGNOSIS — G89.29 CHRONIC BILATERAL LOW BACK PAIN WITH BILATERAL SCIATICA: ICD-10-CM

## 2024-01-09 NOTE — TELEPHONE ENCOUNTER
Spoke with pt letting him know that PKA states that she is not going to send an extension of the pain meds.

## 2024-01-10 ENCOUNTER — OFFICE VISIT (OUTPATIENT)
Age: 54
End: 2024-01-10

## 2024-01-10 VITALS
SYSTOLIC BLOOD PRESSURE: 147 MMHG | WEIGHT: 226.4 LBS | HEART RATE: 118 BPM | TEMPERATURE: 98 F | BODY MASS INDEX: 32.41 KG/M2 | OXYGEN SATURATION: 92 % | DIASTOLIC BLOOD PRESSURE: 87 MMHG | HEIGHT: 70 IN

## 2024-01-10 DIAGNOSIS — G89.29 CHRONIC MIDLINE LOW BACK PAIN, UNSPECIFIED WHETHER SCIATICA PRESENT: Primary | ICD-10-CM

## 2024-01-10 DIAGNOSIS — M54.50 CHRONIC MIDLINE LOW BACK PAIN, UNSPECIFIED WHETHER SCIATICA PRESENT: Primary | ICD-10-CM

## 2024-01-10 RX ORDER — HYDROCODONE BITARTRATE AND ACETAMINOPHEN 5; 325 MG/1; MG/1
1 TABLET ORAL EVERY 6 HOURS PRN
COMMUNITY

## 2024-01-10 ASSESSMENT — ENCOUNTER SYMPTOMS: BACK PAIN: 1

## 2024-01-10 NOTE — PROGRESS NOTES
Vlad Power III (:  1970) is a 53 y.o. male,New patient, here for evaluation of the following chief complaint(s):  Back Pain (PT. C/O CHRONIC  MID LOWER BACK PAIN, DENIES RECENT INJURY. )      ASSESSMENT/PLAN:    ICD-10-CM    1. Chronic midline low back pain, unspecified whether sciatica present  M54.50     G89.29           Dx Diff:chronic back pain out of meds   Education and handout provided on diagnosis and management of symptoms.   AVS reviewed with patient. Follow up as needed in UC or with PCP for new or worsening symptoms.   Return if symptoms worsen or fail to improve.  Tried explaining to patient he signed a contract with pain management to manage his pain and he agrees to not go elsewhere for his pain medication pt states that paper is not worth wiping myself and when you are in pain it does not matter   Pt states again that his appointment was scheduled thru my chart for 455 and they canceled it because you can't schedule thru my chart  pt states he will just go to the ED for the medicine and again tried explaining that he may go but I could not guarantee that they will prescribe him the medications pt and sig other left stating they would go to the ED  Looking at past encounters all are for pain management and are within a day or 2 of his running out of medication   SUBJECTIVE/OBJECTIVE:  Patient presents today with complaints of chronic back pain and needs his medications refilled because pain management can't see him until the  and he ran out of his medicines yesterday pt states scheduled thru my chart and then was called and told he did not have a appointment      History provided by:  Patient   used: No    Back Pain  This is a chronic problem. The current episode started more than 1 year ago. The problem occurs constantly. The problem is unchanged.       Vitals:    01/10/24 1536   BP: (!) 147/87   Site: Left Upper Arm   Position: Sitting   Cuff Size: Large Adult

## 2024-01-11 ENCOUNTER — TELEPHONE (OUTPATIENT)
Dept: PAIN MANAGEMENT | Age: 54
End: 2024-01-11

## 2024-01-11 DIAGNOSIS — M47.817 LUMBOSACRAL SPONDYLOSIS WITHOUT MYELOPATHY: ICD-10-CM

## 2024-01-11 DIAGNOSIS — G89.29 CHRONIC BILATERAL LOW BACK PAIN WITH BILATERAL SCIATICA: ICD-10-CM

## 2024-01-11 DIAGNOSIS — S39.012A STRAIN OF LUMBAR REGION, INITIAL ENCOUNTER: ICD-10-CM

## 2024-01-11 DIAGNOSIS — M54.41 CHRONIC BILATERAL LOW BACK PAIN WITH BILATERAL SCIATICA: ICD-10-CM

## 2024-01-11 DIAGNOSIS — M25.572 CHRONIC PAIN OF LEFT ANKLE: ICD-10-CM

## 2024-01-11 DIAGNOSIS — G89.29 CHRONIC PAIN OF LEFT ANKLE: ICD-10-CM

## 2024-01-11 DIAGNOSIS — G89.4 CHRONIC PAIN SYNDROME: Primary | ICD-10-CM

## 2024-01-11 DIAGNOSIS — M54.42 CHRONIC BILATERAL LOW BACK PAIN WITH BILATERAL SCIATICA: ICD-10-CM

## 2024-01-11 DIAGNOSIS — S22.080S COMPRESSION FRACTURE OF T12 VERTEBRA, SEQUELA: ICD-10-CM

## 2024-01-11 RX ORDER — HYDROCODONE BITARTRATE AND ACETAMINOPHEN 5; 325 MG/1; MG/1
1 TABLET ORAL EVERY 6 HOURS PRN
Qty: 112 TABLET | Refills: 0 | OUTPATIENT
Start: 2024-01-11 | End: 2024-02-08

## 2024-01-11 NOTE — TELEPHONE ENCOUNTER
Patients girlfriend (Shantelle) states he went to [facility] for increase pain in his back and can hardly move. She states he needs to come in sooner, his appointment is not until 1/23/24 but he can hardly or do anything for himself. She states they could not do anything for him since he is under contract with [facility], she also states the doctor told him that \" does not have a ML licenses and can't write any pain medication. Patient's girlfriend would like someone to call her back since she is his main caregiver. She states \"he really needs to get in sooner, he is driving me nuts\"

## 2024-01-11 NOTE — TELEPHONE ENCOUNTER
Spoke with pt's girlfriend Shantelle, letting her know that [facility] sent a referral to Dr Figueredo @ [facility], and gave her their contact info.

## 2024-01-23 ENCOUNTER — OFFICE VISIT (OUTPATIENT)
Dept: PAIN MANAGEMENT | Age: 54
End: 2024-01-23

## 2024-01-23 VITALS
DIASTOLIC BLOOD PRESSURE: 107 MMHG | HEART RATE: 106 BPM | TEMPERATURE: 97.6 F | OXYGEN SATURATION: 95 % | SYSTOLIC BLOOD PRESSURE: 179 MMHG

## 2024-01-23 DIAGNOSIS — G89.4 CHRONIC PAIN SYNDROME: ICD-10-CM

## 2024-01-23 DIAGNOSIS — M54.42 CHRONIC BILATERAL LOW BACK PAIN WITH BILATERAL SCIATICA: ICD-10-CM

## 2024-01-23 DIAGNOSIS — M62.830 BACK SPASM: ICD-10-CM

## 2024-01-23 DIAGNOSIS — M54.2 CERVICALGIA: ICD-10-CM

## 2024-01-23 DIAGNOSIS — E66.09 CLASS 1 OBESITY DUE TO EXCESS CALORIES WITH SERIOUS COMORBIDITY IN ADULT, UNSPECIFIED BMI: ICD-10-CM

## 2024-01-23 DIAGNOSIS — S22.080S COMPRESSION FRACTURE OF T12 VERTEBRA, SEQUELA: ICD-10-CM

## 2024-01-23 DIAGNOSIS — M25.572 CHRONIC PAIN OF LEFT ANKLE: ICD-10-CM

## 2024-01-23 DIAGNOSIS — M47.817 LUMBOSACRAL SPONDYLOSIS WITHOUT MYELOPATHY: ICD-10-CM

## 2024-01-23 DIAGNOSIS — G89.29 CHRONIC PAIN OF LEFT ANKLE: ICD-10-CM

## 2024-01-23 DIAGNOSIS — M54.41 CHRONIC BILATERAL LOW BACK PAIN WITH BILATERAL SCIATICA: ICD-10-CM

## 2024-01-23 DIAGNOSIS — G89.29 CHRONIC BILATERAL LOW BACK PAIN WITH BILATERAL SCIATICA: ICD-10-CM

## 2024-01-23 RX ORDER — IBUPROFEN 800 MG/1
800 TABLET ORAL EVERY 8 HOURS PRN
Qty: 90 TABLET | Refills: 0 | Status: SHIPPED | OUTPATIENT
Start: 2024-01-23 | End: 2024-02-22

## 2024-01-23 RX ORDER — GABAPENTIN 300 MG/1
300 CAPSULE ORAL NIGHTLY
Qty: 30 CAPSULE | Refills: 0 | Status: SHIPPED | OUTPATIENT
Start: 2024-01-23 | End: 2024-02-22

## 2024-01-23 RX ORDER — HYDROCODONE BITARTRATE AND ACETAMINOPHEN 5; 325 MG/1; MG/1
1 TABLET ORAL EVERY 4 HOURS PRN
Qty: 120 TABLET | Refills: 0 | Status: SHIPPED | OUTPATIENT
Start: 2024-01-23 | End: 2024-01-23

## 2024-01-23 RX ORDER — HYDROCODONE BITARTRATE AND ACETAMINOPHEN 5; 325 MG/1; MG/1
1 TABLET ORAL EVERY 6 HOURS PRN
Qty: 120 TABLET | Refills: 0 | Status: SHIPPED | OUTPATIENT
Start: 2024-01-23 | End: 2024-02-22

## 2024-01-23 NOTE — PROGRESS NOTES
Adverse report: No              OARRS:                          Checked today: Yes                          Adverse report: No    IMPRESSION:   1. Chronic pain syndrome    2. Lumbosacral spondylosis without myelopathy    3. Chronic pain of left ankle    4. Chronic bilateral low back pain with bilateral sciatica    5. Compression fracture of T12 vertebra, sequela    6. Back spasm    7. Cervicalgia    8. Class 1 obesity due to excess calories with serious comorbidity in adult, unspecified BMI        PLAN:  Informed verbal consent was obtained:  -Patients opioid therapy will be maintained at current dose  -Home exercises/Sobeida exercises recommended  -Coached patient on attempting to collect opioids from Urgent care after missing his appointment. Patient reports he was hurting. Reviewed pain contract with the patient, passing UDS/Pill count is required to maintain on-going opioid therapy. Obtaining opioids outside the pain contract can only be done upon approval/verification with the pain provider in unique circumstances such as surgery. Patient verbalized understanding   -Pain contract was reviewed today as well as renewed.  -Recommended that patient monitor BP, f/u with PCP if it continues to stay elevated or he becomes symptomatic with SOB, CP, syncopy. Advised to maintain compliance with prescribed antihypertensives. he is currently asymptomatic   -Advised patient to quit smoking for  health related concerns and to improve the treatment outcomes. Education was given on quitting smoking and the use of different modalities including medications, hypnotherapy, counselling  and biofeedback. These were discussed with patient.   -CBT techniques- relaxation therapies such as biofeedback, mindfulness based stress reduction, imagery, cognitive restructuring, problem solving discussed with patient   -Last UDS 6/13/23 consistent  -Return in about 4 weeks (around 2/20/2024).     Analgesic Plan:              Continue

## 2024-02-21 ENCOUNTER — TELEPHONE (OUTPATIENT)
Dept: PAIN MANAGEMENT | Age: 54
End: 2024-02-21

## 2024-02-21 DIAGNOSIS — M25.572 CHRONIC PAIN OF LEFT ANKLE: ICD-10-CM

## 2024-02-21 DIAGNOSIS — S22.080S COMPRESSION FRACTURE OF T12 VERTEBRA, SEQUELA: ICD-10-CM

## 2024-02-21 DIAGNOSIS — G89.29 CHRONIC BILATERAL LOW BACK PAIN WITH BILATERAL SCIATICA: ICD-10-CM

## 2024-02-21 DIAGNOSIS — M54.2 CERVICALGIA: ICD-10-CM

## 2024-02-21 DIAGNOSIS — M47.817 LUMBOSACRAL SPONDYLOSIS WITHOUT MYELOPATHY: ICD-10-CM

## 2024-02-21 DIAGNOSIS — M62.830 BACK SPASM: ICD-10-CM

## 2024-02-21 DIAGNOSIS — G89.4 CHRONIC PAIN SYNDROME: ICD-10-CM

## 2024-02-21 DIAGNOSIS — M54.41 CHRONIC BILATERAL LOW BACK PAIN WITH BILATERAL SCIATICA: ICD-10-CM

## 2024-02-21 DIAGNOSIS — G89.29 CHRONIC PAIN OF LEFT ANKLE: ICD-10-CM

## 2024-02-21 DIAGNOSIS — M54.42 CHRONIC BILATERAL LOW BACK PAIN WITH BILATERAL SCIATICA: ICD-10-CM

## 2024-02-21 RX ORDER — HYDROCODONE BITARTRATE AND ACETAMINOPHEN 5; 325 MG/1; MG/1
1 TABLET ORAL EVERY 6 HOURS PRN
Qty: 56 TABLET | Refills: 0 | Status: SHIPPED | OUTPATIENT
Start: 2024-02-21 | End: 2024-03-05 | Stop reason: SDUPTHER

## 2024-02-21 NOTE — TELEPHONE ENCOUNTER
Pt needs an extension for HYDROcodone-acetaminophen (NORCO) 5-325 MG  sent to   [facility] [place] [facility] [address] -   P [phone]

## 2024-03-05 ENCOUNTER — OFFICE VISIT (OUTPATIENT)
Dept: PAIN MANAGEMENT | Age: 54
End: 2024-03-05

## 2024-03-05 VITALS
TEMPERATURE: 97.3 F | DIASTOLIC BLOOD PRESSURE: 95 MMHG | SYSTOLIC BLOOD PRESSURE: 149 MMHG | OXYGEN SATURATION: 96 % | BODY MASS INDEX: 32.57 KG/M2 | HEART RATE: 95 BPM | WEIGHT: 227 LBS

## 2024-03-05 DIAGNOSIS — M54.41 CHRONIC BILATERAL LOW BACK PAIN WITH BILATERAL SCIATICA: ICD-10-CM

## 2024-03-05 DIAGNOSIS — E66.09 CLASS 2 OBESITY DUE TO EXCESS CALORIES WITHOUT SERIOUS COMORBIDITY IN ADULT, UNSPECIFIED BMI: ICD-10-CM

## 2024-03-05 DIAGNOSIS — G89.4 CHRONIC PAIN SYNDROME: ICD-10-CM

## 2024-03-05 DIAGNOSIS — M47.817 LUMBOSACRAL SPONDYLOSIS WITHOUT MYELOPATHY: ICD-10-CM

## 2024-03-05 DIAGNOSIS — M25.572 CHRONIC PAIN OF LEFT ANKLE: ICD-10-CM

## 2024-03-05 DIAGNOSIS — M54.42 CHRONIC BILATERAL LOW BACK PAIN WITH BILATERAL SCIATICA: ICD-10-CM

## 2024-03-05 DIAGNOSIS — S22.080S COMPRESSION FRACTURE OF T12 VERTEBRA, SEQUELA: ICD-10-CM

## 2024-03-05 DIAGNOSIS — M54.2 CERVICALGIA: ICD-10-CM

## 2024-03-05 DIAGNOSIS — G89.29 CHRONIC PAIN OF LEFT ANKLE: ICD-10-CM

## 2024-03-05 DIAGNOSIS — G89.29 CHRONIC BILATERAL LOW BACK PAIN WITH BILATERAL SCIATICA: ICD-10-CM

## 2024-03-05 DIAGNOSIS — M62.830 BACK SPASM: ICD-10-CM

## 2024-03-05 RX ORDER — GABAPENTIN 300 MG/1
300 CAPSULE ORAL NIGHTLY
Qty: 30 CAPSULE | Refills: 0 | Status: SHIPPED | OUTPATIENT
Start: 2024-03-05 | End: 2024-04-04

## 2024-03-05 RX ORDER — HYDROCODONE BITARTRATE AND ACETAMINOPHEN 5; 325 MG/1; MG/1
1 TABLET ORAL EVERY 6 HOURS PRN
Qty: 112 TABLET | Refills: 0 | Status: SHIPPED | OUTPATIENT
Start: 2024-03-05 | End: 2024-04-02

## 2024-03-05 NOTE — PROGRESS NOTES
Vlad Power III  1970  [id number]    HISTORY OF PRESENT ILLNESS: Mr. Power is a 53 y.o. male returns for a follow up visit for pain management  He has a diagnosis of   1. Chronic pain syndrome    2. Lumbosacral spondylosis without myelopathy    3. Chronic bilateral low back pain with bilateral sciatica    4. Compression fracture of T12 vertebra, sequela    5. Back spasm    6. Chronic pain of left ankle    7. Cervicalgia    8. Class 2 obesity due to excess calories without serious comorbidity in adult, unspecified BMI      As per Information Obtained from the PADT (Patient Assessment and Documentation Tool)    He complains of pain in the  down the spine, both legs both feet  He rates the pain 8/10 and describes it as aching. Current treatment regimen has helped relieve about 40% of the pain.  He denies any side effects from the current pain regimen. Patient reports that since the last follow up visit the physical functioning is unchanged, family/social relationships are unchanged, mood is unchanged sleep patterns are unchanged, and that the overall functioning is unchanged.  Patient denies misusing/abusing his narcotic pain medications or using any illegal drugs.      Upon obtaining medical history from Mr. Power states that pain is manageable on current pain therapy. Takes the pain medications as prescribed. He is yet to f/u with [facility] for spinal injections. Mood/anxiety is stable. Sleep is fair with an average of 5-6 hours. Denies to having issues of constipation. Tolerating activities/house chores with moderate tenderness to the lower back. Working on smoking cessation    ALLERGIES: Patients list of allergies were reviewed     MEDICATIONS: Mr. Power list of medications were reviewed.His current medications are   Outpatient Medications Prior to Visit   Medication Sig Dispense Refill    naloxone (NARCAN) 4 MG/0.1ML LIQD nasal spray Use as directed 1 each 0

## 2024-03-19 ENCOUNTER — TELEPHONE (OUTPATIENT)
Dept: PAIN MANAGEMENT | Age: 54
End: 2024-03-19

## 2024-03-19 DIAGNOSIS — M62.830 BACK SPASM: ICD-10-CM

## 2024-03-19 DIAGNOSIS — M54.41 CHRONIC BILATERAL LOW BACK PAIN WITH BILATERAL SCIATICA: ICD-10-CM

## 2024-03-19 DIAGNOSIS — M47.817 LUMBOSACRAL SPONDYLOSIS WITHOUT MYELOPATHY: ICD-10-CM

## 2024-03-19 DIAGNOSIS — G89.29 CHRONIC BILATERAL LOW BACK PAIN WITH BILATERAL SCIATICA: ICD-10-CM

## 2024-03-19 DIAGNOSIS — G89.4 CHRONIC PAIN SYNDROME: Primary | ICD-10-CM

## 2024-03-19 DIAGNOSIS — S22.080S COMPRESSION FRACTURE OF T12 VERTEBRA, SEQUELA: ICD-10-CM

## 2024-03-19 DIAGNOSIS — M54.42 CHRONIC BILATERAL LOW BACK PAIN WITH BILATERAL SCIATICA: ICD-10-CM

## 2024-03-19 RX ORDER — METHYLPREDNISOLONE 4 MG/1
TABLET ORAL
Qty: 1 KIT | Refills: 0 | Status: SHIPPED | OUTPATIENT
Start: 2024-03-19 | End: 2024-03-25

## 2024-03-19 NOTE — TELEPHONE ENCOUNTER
Reason for refill request: pt is out       Name of medication: methylPREDNISolone (MEDROL DOSEPACK) 4 MG tablet       Pharmacy name:  [facility] [place] [address]         Phone number: [phone]       Last refill: 12/12      Next appointment: 4/2    Patient confirmed the above pharmacy has their medication in stock

## 2024-03-19 NOTE — TELEPHONE ENCOUNTER
Spoke with pt letting him know that [facility] sent the Medrol Dose PK, also sent a referral to the [facility], Pt states he has the number.

## 2024-04-02 ENCOUNTER — OFFICE VISIT (OUTPATIENT)
Dept: PAIN MANAGEMENT | Age: 54
End: 2024-04-02

## 2024-04-02 VITALS
SYSTOLIC BLOOD PRESSURE: 148 MMHG | OXYGEN SATURATION: 96 % | HEART RATE: 105 BPM | DIASTOLIC BLOOD PRESSURE: 99 MMHG | TEMPERATURE: 97.4 F

## 2024-04-02 DIAGNOSIS — M54.42 CHRONIC BILATERAL LOW BACK PAIN WITH BILATERAL SCIATICA: ICD-10-CM

## 2024-04-02 DIAGNOSIS — M47.817 LUMBOSACRAL SPONDYLOSIS WITHOUT MYELOPATHY: ICD-10-CM

## 2024-04-02 DIAGNOSIS — G89.4 CHRONIC PAIN SYNDROME: ICD-10-CM

## 2024-04-02 DIAGNOSIS — S22.080S COMPRESSION FRACTURE OF T12 VERTEBRA, SEQUELA: ICD-10-CM

## 2024-04-02 DIAGNOSIS — M54.2 CERVICALGIA: ICD-10-CM

## 2024-04-02 DIAGNOSIS — G89.29 CHRONIC PAIN OF LEFT ANKLE: ICD-10-CM

## 2024-04-02 DIAGNOSIS — M62.830 BACK SPASM: ICD-10-CM

## 2024-04-02 DIAGNOSIS — M54.41 CHRONIC BILATERAL LOW BACK PAIN WITH BILATERAL SCIATICA: ICD-10-CM

## 2024-04-02 DIAGNOSIS — G89.29 CHRONIC BILATERAL LOW BACK PAIN WITH BILATERAL SCIATICA: ICD-10-CM

## 2024-04-02 DIAGNOSIS — M25.572 CHRONIC PAIN OF LEFT ANKLE: ICD-10-CM

## 2024-04-02 DIAGNOSIS — E66.09 CLASS 2 OBESITY DUE TO EXCESS CALORIES WITHOUT SERIOUS COMORBIDITY IN ADULT, UNSPECIFIED BMI: ICD-10-CM

## 2024-04-02 RX ORDER — HYDROCODONE BITARTRATE AND ACETAMINOPHEN 5; 325 MG/1; MG/1
1 TABLET ORAL EVERY 6 HOURS PRN
Qty: 112 TABLET | Refills: 0 | Status: SHIPPED | OUTPATIENT
Start: 2024-04-02 | End: 2024-04-30

## 2024-04-02 RX ORDER — IBUPROFEN 800 MG/1
800 TABLET ORAL EVERY 8 HOURS PRN
Qty: 90 TABLET | Refills: 0 | Status: SHIPPED | OUTPATIENT
Start: 2024-04-02 | End: 2024-05-02

## 2024-04-02 RX ORDER — BACLOFEN 10 MG/1
10 TABLET ORAL 2 TIMES DAILY
Qty: 60 TABLET | Refills: 0 | Status: SHIPPED | OUTPATIENT
Start: 2024-04-02 | End: 2024-05-02

## 2024-04-02 RX ORDER — GABAPENTIN 300 MG/1
300 CAPSULE ORAL NIGHTLY
Qty: 30 CAPSULE | Refills: 0 | Status: SHIPPED | OUTPATIENT
Start: 2024-04-02 | End: 2024-05-02

## 2024-04-02 NOTE — PROGRESS NOTES
Motrin, Narcan, Neurontin, Lidocaine    I will continue his current medication regimen  which is part of the above treatment schedule. It has been helping with Mr. Power's chronic  medical problems which for this visit include:   Diagnoses of Chronic pain syndrome, Lumbosacral spondylosis without myelopathy, Chronic bilateral low back pain with bilateral sciatica, Compression fracture of T12 vertebra, sequela, Back spasm, Cervicalgia, Chronic pain of left ankle, and Class 2 obesity due to excess calories without serious comorbidity in adult, unspecified BMI were pertinent to this visit.   Risks and benefits of the medications and other alternative treatments  including no treatment were discussed with the patient.The common side effects of these medications were also explained to the patient.  Informed verbal consent was obtained.   Goals of current treatment regimen include improvement in pain, restoration of functioning- with focus on improvement in physical performance, general activity, work or disability,emotional distress, health care utilization and  decreased medication consumption. Will continue to monitor progress towards achieving/maintaining therapeutic goals with special emphasis on  1. Improvement in perceived interfernce  of pain with ADL's. Ability to do home exercises independently. Ability to do household chores indoor and/or outdoor work and social and leisure activities.Improve psychosocial and physical functioning. - he is showing progression towards this treatment goal with the current regimen.     He was advised against drinking alcohol with the narcotic pain medicines, advised against driving or handling machinery while adjusting the dose of medicines or if having cognitive  issues related to the current medications.Risk of overdose and death, if medicines not taken as prescribed, were also discussed. If the patient develops new symptoms or if the symptoms worsen, the patient should call

## 2024-04-30 ENCOUNTER — OFFICE VISIT (OUTPATIENT)
Dept: PAIN MANAGEMENT | Age: 54
End: 2024-04-30
Payer: COMMERCIAL

## 2024-04-30 VITALS
DIASTOLIC BLOOD PRESSURE: 82 MMHG | WEIGHT: 228 LBS | HEART RATE: 76 BPM | BODY MASS INDEX: 32.71 KG/M2 | SYSTOLIC BLOOD PRESSURE: 124 MMHG | OXYGEN SATURATION: 98 % | TEMPERATURE: 97.8 F

## 2024-04-30 DIAGNOSIS — M62.830 BACK SPASM: ICD-10-CM

## 2024-04-30 DIAGNOSIS — E66.09 CLASS 2 OBESITY DUE TO EXCESS CALORIES WITHOUT SERIOUS COMORBIDITY IN ADULT, UNSPECIFIED BMI: ICD-10-CM

## 2024-04-30 DIAGNOSIS — M54.42 CHRONIC BILATERAL LOW BACK PAIN WITH BILATERAL SCIATICA: ICD-10-CM

## 2024-04-30 DIAGNOSIS — M25.572 CHRONIC PAIN OF LEFT ANKLE: ICD-10-CM

## 2024-04-30 DIAGNOSIS — G89.4 CHRONIC PAIN SYNDROME: ICD-10-CM

## 2024-04-30 DIAGNOSIS — M54.2 CERVICALGIA: ICD-10-CM

## 2024-04-30 DIAGNOSIS — S22.080S COMPRESSION FRACTURE OF T12 VERTEBRA, SEQUELA: ICD-10-CM

## 2024-04-30 DIAGNOSIS — M54.41 CHRONIC BILATERAL LOW BACK PAIN WITH BILATERAL SCIATICA: ICD-10-CM

## 2024-04-30 DIAGNOSIS — G89.29 CHRONIC BILATERAL LOW BACK PAIN WITH BILATERAL SCIATICA: ICD-10-CM

## 2024-04-30 DIAGNOSIS — G89.29 CHRONIC PAIN OF LEFT ANKLE: ICD-10-CM

## 2024-04-30 DIAGNOSIS — M47.817 LUMBOSACRAL SPONDYLOSIS WITHOUT MYELOPATHY: ICD-10-CM

## 2024-04-30 PROCEDURE — G8427 DOCREV CUR MEDS BY ELIG CLIN: HCPCS | Performed by: NURSE PRACTITIONER

## 2024-04-30 PROCEDURE — 4004F PT TOBACCO SCREEN RCVD TLK: CPT | Performed by: NURSE PRACTITIONER

## 2024-04-30 PROCEDURE — 3017F COLORECTAL CA SCREEN DOC REV: CPT | Performed by: NURSE PRACTITIONER

## 2024-04-30 PROCEDURE — 99214 OFFICE O/P EST MOD 30 MIN: CPT | Performed by: NURSE PRACTITIONER

## 2024-04-30 PROCEDURE — G8417 CALC BMI ABV UP PARAM F/U: HCPCS | Performed by: NURSE PRACTITIONER

## 2024-04-30 RX ORDER — HYDROCODONE BITARTRATE AND ACETAMINOPHEN 7.5; 325 MG/1; MG/1
1 TABLET ORAL EVERY 6 HOURS PRN
Qty: 112 TABLET | Refills: 0 | Status: SHIPPED | OUTPATIENT
Start: 2024-04-30 | End: 2024-05-28

## 2024-04-30 RX ORDER — BACLOFEN 10 MG/1
10 TABLET ORAL 2 TIMES DAILY
Qty: 60 TABLET | Refills: 0 | Status: SHIPPED | OUTPATIENT
Start: 2024-04-30 | End: 2024-05-30

## 2024-04-30 RX ORDER — IBUPROFEN 800 MG/1
800 TABLET ORAL EVERY 8 HOURS PRN
Qty: 90 TABLET | Refills: 0 | Status: SHIPPED | OUTPATIENT
Start: 2024-04-30 | End: 2024-05-30

## 2024-04-30 RX ORDER — GABAPENTIN 300 MG/1
300 CAPSULE ORAL NIGHTLY
Qty: 30 CAPSULE | Refills: 0 | Status: SHIPPED | OUTPATIENT
Start: 2024-04-30 | End: 2024-05-30

## 2024-04-30 NOTE — PROGRESS NOTES
Vlad Power III  1970  [id number]    HISTORY OF PRESENT ILLNESS: Mr. Power is a 54 y.o. male returns for a follow up visit for pain management  He has a diagnosis of   1. Chronic pain syndrome    2. Lumbosacral spondylosis without myelopathy    3. Chronic bilateral low back pain with bilateral sciatica    4. Compression fracture of T12 vertebra, sequela    5. Back spasm    6. Cervicalgia    7. Chronic pain of left ankle    8. Class 2 obesity due to excess calories without serious comorbidity in adult, unspecified BMI      As per Information Obtained from the PADT (Patient Assessment and Documentation Tool)    He complains of pain in the enitre body. He rates the pain 9/10 and describes it as numbness. Current treatment regimen has helped relieve about 10% of the pain.  He has gabapentin  any side effects from the current pain regimen. Patient reports that since the last follow up visit the physical functioning is worse, family/social relationships are worse, mood is worse sleep patterns are worse, and that the overall functioning is worse.  Patient denies misusing/abusing his narcotic pain medications or using any illegal drugs.      Upon obtaining medical history from Mr. Power states that pain is somewhat manageable on current pain therapy. Takes the pain medications as prescribed. Mood/anxiety is stable. Sleep is fair with an average of 5-6 hours. Denies to having issues of constipation. Tolerating activities/house chores with moderate tenderness to the lower back. Working on smoking cessation    ALLERGIES: Patients list of allergies were reviewed     MEDICATIONS: Mr. Power list of medications were reviewed.His current medications are   Outpatient Medications Prior to Visit   Medication Sig Dispense Refill    HYDROcodone-acetaminophen (NORCO) 5-325 MG per tablet Take 1 tablet by mouth every 6 hours as needed for Pain for up to 28 days. Max Daily Amount: 4 tablets 112 tablet 0    naloxone

## 2024-05-28 ENCOUNTER — OFFICE VISIT (OUTPATIENT)
Dept: PAIN MANAGEMENT | Age: 54
End: 2024-05-28

## 2024-05-28 VITALS
HEART RATE: 107 BPM | OXYGEN SATURATION: 95 % | SYSTOLIC BLOOD PRESSURE: 132 MMHG | TEMPERATURE: 97 F | DIASTOLIC BLOOD PRESSURE: 85 MMHG

## 2024-05-28 DIAGNOSIS — G89.4 CHRONIC PAIN SYNDROME: ICD-10-CM

## 2024-05-28 DIAGNOSIS — M62.830 BACK SPASM: ICD-10-CM

## 2024-05-28 DIAGNOSIS — E66.09 CLASS 2 OBESITY DUE TO EXCESS CALORIES WITHOUT SERIOUS COMORBIDITY IN ADULT, UNSPECIFIED BMI: ICD-10-CM

## 2024-05-28 DIAGNOSIS — M54.2 CERVICALGIA: ICD-10-CM

## 2024-05-28 DIAGNOSIS — G89.29 CHRONIC BILATERAL LOW BACK PAIN WITH BILATERAL SCIATICA: ICD-10-CM

## 2024-05-28 DIAGNOSIS — M54.42 CHRONIC BILATERAL LOW BACK PAIN WITH BILATERAL SCIATICA: ICD-10-CM

## 2024-05-28 DIAGNOSIS — S22.080S COMPRESSION FRACTURE OF T12 VERTEBRA, SEQUELA: ICD-10-CM

## 2024-05-28 DIAGNOSIS — M25.572 CHRONIC PAIN OF LEFT ANKLE: ICD-10-CM

## 2024-05-28 DIAGNOSIS — G89.29 CHRONIC PAIN OF LEFT ANKLE: ICD-10-CM

## 2024-05-28 DIAGNOSIS — M54.41 CHRONIC BILATERAL LOW BACK PAIN WITH BILATERAL SCIATICA: ICD-10-CM

## 2024-05-28 DIAGNOSIS — M47.817 LUMBOSACRAL SPONDYLOSIS WITHOUT MYELOPATHY: ICD-10-CM

## 2024-05-28 RX ORDER — BACLOFEN 10 MG/1
10 TABLET ORAL 2 TIMES DAILY
Qty: 60 TABLET | Refills: 0 | Status: SHIPPED | OUTPATIENT
Start: 2024-05-28 | End: 2024-06-27

## 2024-05-28 RX ORDER — IBUPROFEN 800 MG/1
800 TABLET ORAL EVERY 8 HOURS PRN
Qty: 90 TABLET | Refills: 0 | Status: SHIPPED | OUTPATIENT
Start: 2024-05-28 | End: 2024-06-27

## 2024-05-28 RX ORDER — GABAPENTIN 300 MG/1
300 CAPSULE ORAL NIGHTLY
Qty: 30 CAPSULE | Refills: 0 | Status: SHIPPED | OUTPATIENT
Start: 2024-05-28 | End: 2024-06-27

## 2024-05-28 RX ORDER — HYDROCODONE BITARTRATE AND ACETAMINOPHEN 7.5; 325 MG/1; MG/1
1 TABLET ORAL EVERY 6 HOURS PRN
Qty: 112 TABLET | Refills: 0 | Status: SHIPPED | OUTPATIENT
Start: 2024-05-28 | End: 2024-06-25

## 2024-05-28 NOTE — PROGRESS NOTES
Vlad Power III  1970  [id number]    HISTORY OF PRESENT ILLNESS: Mr. Power is a 54 y.o. male returns for a follow up visit for pain management  He has a diagnosis of   1. Chronic pain syndrome    2. Lumbosacral spondylosis without myelopathy    3. Chronic bilateral low back pain with bilateral sciatica    4. Compression fracture of T12 vertebra, sequela    5. Back spasm    6. Cervicalgia    7. Chronic pain of left ankle    8. Class 2 obesity due to excess calories without serious comorbidity in adult, unspecified BMI      As per Information Obtained from the PADT (Patient Assessment and Documentation Tool)    He complains of pain in the  down the spine, both arms, both legs, both feet  He rates the pain 6/10 and describes it as aching. Current treatment regimen has helped relieve about 50% of the pain.  He denies any side effects from the current pain regimen. Patient reports that since the last follow up visit the physical functioning is unchanged, family/social relationships are unchanged, mood is unchanged sleep patterns are unchanged, and that the overall functioning is unchanged.  Patient denies misusing/abusing his narcotic pain medications or using any illegal drugs.      Upon obtaining medical history from Mr. Power states that pain is manageable on current pain therapy. Takes the pain medications as prescribed. Mood/anxiety is stable. Sleep is fair with an average of 5-6 hours. Denies to having issues of constipation. Tolerating activities/house chores with moderate tenderness to the lower back. Working on smoking cessation    ALLERGIES: Patients list of allergies were reviewed     MEDICATIONS: Mr. Power list of medications were reviewed.His current medications are   Outpatient Medications Prior to Visit   Medication Sig Dispense Refill    naloxone (NARCAN) 4 MG/0.1ML LIQD nasal spray Use as directed 1 each 0    baclofen (LIORESAL) 10 MG tablet Take 1 tablet by mouth 2 times daily

## 2024-05-28 NOTE — TELEPHONE ENCOUNTER
Cindy johnston [facility] scheduling is requesting the doctor change the diagnosis code on the pts dexa bone density scan.    Please call the pt when order is updated [phone]   Per [facility], she's not changing is med. Called and spoke to pt, he expressed his frustration.

## 2024-06-25 ENCOUNTER — TELEPHONE (OUTPATIENT)
Dept: PAIN MANAGEMENT | Age: 54
End: 2024-06-25

## 2024-06-25 ENCOUNTER — OFFICE VISIT (OUTPATIENT)
Dept: PAIN MANAGEMENT | Age: 54
End: 2024-06-25
Payer: COMMERCIAL

## 2024-06-25 VITALS
OXYGEN SATURATION: 95 % | SYSTOLIC BLOOD PRESSURE: 160 MMHG | TEMPERATURE: 96.8 F | DIASTOLIC BLOOD PRESSURE: 93 MMHG | HEART RATE: 103 BPM

## 2024-06-25 DIAGNOSIS — Z51.81 ENCOUNTER FOR THERAPEUTIC DRUG MONITORING: ICD-10-CM

## 2024-06-25 DIAGNOSIS — M47.817 LUMBOSACRAL SPONDYLOSIS WITHOUT MYELOPATHY: ICD-10-CM

## 2024-06-25 DIAGNOSIS — E66.09 CLASS 2 OBESITY DUE TO EXCESS CALORIES WITHOUT SERIOUS COMORBIDITY IN ADULT, UNSPECIFIED BMI: ICD-10-CM

## 2024-06-25 DIAGNOSIS — M54.41 CHRONIC BILATERAL LOW BACK PAIN WITH BILATERAL SCIATICA: ICD-10-CM

## 2024-06-25 DIAGNOSIS — M54.42 CHRONIC BILATERAL LOW BACK PAIN WITH BILATERAL SCIATICA: ICD-10-CM

## 2024-06-25 DIAGNOSIS — S22.080S COMPRESSION FRACTURE OF T12 VERTEBRA, SEQUELA: ICD-10-CM

## 2024-06-25 DIAGNOSIS — G89.4 CHRONIC PAIN SYNDROME: ICD-10-CM

## 2024-06-25 DIAGNOSIS — M54.2 CERVICALGIA: ICD-10-CM

## 2024-06-25 DIAGNOSIS — M25.572 CHRONIC PAIN OF LEFT ANKLE: ICD-10-CM

## 2024-06-25 DIAGNOSIS — G89.29 CHRONIC BILATERAL LOW BACK PAIN WITH BILATERAL SCIATICA: ICD-10-CM

## 2024-06-25 DIAGNOSIS — G89.29 CHRONIC PAIN OF LEFT ANKLE: ICD-10-CM

## 2024-06-25 DIAGNOSIS — M62.830 BACK SPASM: ICD-10-CM

## 2024-06-25 PROCEDURE — 4004F PT TOBACCO SCREEN RCVD TLK: CPT | Performed by: NURSE PRACTITIONER

## 2024-06-25 PROCEDURE — G8427 DOCREV CUR MEDS BY ELIG CLIN: HCPCS | Performed by: NURSE PRACTITIONER

## 2024-06-25 PROCEDURE — 99214 OFFICE O/P EST MOD 30 MIN: CPT | Performed by: NURSE PRACTITIONER

## 2024-06-25 PROCEDURE — G8417 CALC BMI ABV UP PARAM F/U: HCPCS | Performed by: NURSE PRACTITIONER

## 2024-06-25 PROCEDURE — 3017F COLORECTAL CA SCREEN DOC REV: CPT | Performed by: NURSE PRACTITIONER

## 2024-06-25 RX ORDER — IBUPROFEN 800 MG/1
800 TABLET ORAL EVERY 8 HOURS PRN
Qty: 90 TABLET | Refills: 0 | Status: SHIPPED | OUTPATIENT
Start: 2024-06-25 | End: 2024-07-25

## 2024-06-25 RX ORDER — HYDROCODONE BITARTRATE AND ACETAMINOPHEN 7.5; 325 MG/1; MG/1
1 TABLET ORAL EVERY 6 HOURS PRN
Qty: 112 TABLET | Refills: 0 | Status: SHIPPED | OUTPATIENT
Start: 2024-06-25 | End: 2024-07-23

## 2024-06-25 RX ORDER — METHYLPREDNISOLONE 4 MG/1
TABLET ORAL
Qty: 1 KIT | Refills: 0 | Status: SHIPPED | OUTPATIENT
Start: 2024-06-25 | End: 2024-07-01

## 2024-06-25 RX ORDER — BACLOFEN 10 MG/1
10 TABLET ORAL 2 TIMES DAILY
Qty: 60 TABLET | Refills: 0 | Status: SHIPPED | OUTPATIENT
Start: 2024-06-25 | End: 2024-07-25

## 2024-06-25 RX ORDER — GABAPENTIN 300 MG/1
300 CAPSULE ORAL NIGHTLY
Qty: 30 CAPSULE | Refills: 0 | Status: SHIPPED | OUTPATIENT
Start: 2024-06-25 | End: 2024-07-25

## 2024-06-25 NOTE — PROGRESS NOTES
Vlad Power III  1970  [id number]    HISTORY OF PRESENT ILLNESS: Mr. Power is a 54 y.o. male returns for a follow up visit for pain management  He has a diagnosis of   1. Chronic pain syndrome    2. Lumbosacral spondylosis without myelopathy    3. Chronic bilateral low back pain with bilateral sciatica    4. Compression fracture of T12 vertebra, sequela    5. Back spasm    6. Cervicalgia    7. Chronic pain of left ankle    8. Class 2 obesity due to excess calories without serious comorbidity in adult, unspecified BMI    9. Encounter for therapeutic drug monitoring      As per Information Obtained from the PADT (Patient Assessment and Documentation Tool)    He complains of pain in the  both shoulders, both arms, lower back, both legs, both feet  He rates the pain 7/10 and describes it as aching. Current treatment regimen has helped relieve about 60% of the pain.  He denies any side effects from the current pain regimen. Patient reports that since the last follow up visit the physical functioning is unchanged, family/social relationships are unchanged, mood is unchanged sleep patterns are unchanged, and that the overall functioning is unchanged.  Patient denies misusing/abusing his narcotic pain medications or using any illegal drugs.      Upon obtaining medical history from Mr. Power states that pain is manageable on current pain therapy. Takes the pain medications as prescribed. He has had pain with increased phyical activities, requesting for medrol dose pack. Mood/anxiety is stable. Sleep is fair with an average of 5-6 hours. Denies to having issues of constipation. Tolerating activities/house chores with moderate tenderness to the lower back. Working on smoking cessation    ALLERGIES: Patients list of allergies were reviewed     MEDICATIONS: Mr. Power list of medications were reviewed.His current medications are   Outpatient Medications Prior to Visit   Medication Sig Dispense Refill

## 2024-07-01 ENCOUNTER — TELEPHONE (OUTPATIENT)
Dept: PAIN MANAGEMENT | Age: 54
End: 2024-07-01

## 2024-07-03 NOTE — TELEPHONE ENCOUNTER
He came into the [place] office today with his pain medications.   Rx: Hydrocodone 7.5/325  Sig: take one tablet po Q6H max 4 per day   Filled on:06/25/2024  Pills do match the bottle's description  Pills presented: 82  Should have 80 pills remaining   Pill count passed     Pill count was witnessed by Patient and ESPINOZA Wu

## 2024-07-23 ENCOUNTER — OFFICE VISIT (OUTPATIENT)
Dept: PAIN MANAGEMENT | Age: 54
End: 2024-07-23
Payer: COMMERCIAL

## 2024-07-23 VITALS
DIASTOLIC BLOOD PRESSURE: 95 MMHG | SYSTOLIC BLOOD PRESSURE: 144 MMHG | OXYGEN SATURATION: 94 % | HEART RATE: 122 BPM | TEMPERATURE: 96.9 F

## 2024-07-23 DIAGNOSIS — M54.42 CHRONIC BILATERAL LOW BACK PAIN WITH BILATERAL SCIATICA: ICD-10-CM

## 2024-07-23 DIAGNOSIS — M47.817 LUMBOSACRAL SPONDYLOSIS WITHOUT MYELOPATHY: ICD-10-CM

## 2024-07-23 DIAGNOSIS — M25.572 CHRONIC PAIN OF LEFT ANKLE: ICD-10-CM

## 2024-07-23 DIAGNOSIS — G89.29 CHRONIC BILATERAL LOW BACK PAIN WITH BILATERAL SCIATICA: ICD-10-CM

## 2024-07-23 DIAGNOSIS — M62.830 BACK SPASM: ICD-10-CM

## 2024-07-23 DIAGNOSIS — G89.29 CHRONIC PAIN OF LEFT ANKLE: ICD-10-CM

## 2024-07-23 DIAGNOSIS — S22.080S COMPRESSION FRACTURE OF T12 VERTEBRA, SEQUELA: ICD-10-CM

## 2024-07-23 DIAGNOSIS — E66.09 CLASS 2 OBESITY DUE TO EXCESS CALORIES WITHOUT SERIOUS COMORBIDITY IN ADULT, UNSPECIFIED BMI: ICD-10-CM

## 2024-07-23 DIAGNOSIS — M54.41 CHRONIC BILATERAL LOW BACK PAIN WITH BILATERAL SCIATICA: ICD-10-CM

## 2024-07-23 DIAGNOSIS — G89.4 CHRONIC PAIN SYNDROME: ICD-10-CM

## 2024-07-23 DIAGNOSIS — M54.2 CERVICALGIA: ICD-10-CM

## 2024-07-23 DIAGNOSIS — Z51.81 ENCOUNTER FOR THERAPEUTIC DRUG MONITORING: ICD-10-CM

## 2024-07-23 PROCEDURE — G8427 DOCREV CUR MEDS BY ELIG CLIN: HCPCS | Performed by: NURSE PRACTITIONER

## 2024-07-23 PROCEDURE — 3017F COLORECTAL CA SCREEN DOC REV: CPT | Performed by: NURSE PRACTITIONER

## 2024-07-23 PROCEDURE — G8417 CALC BMI ABV UP PARAM F/U: HCPCS | Performed by: NURSE PRACTITIONER

## 2024-07-23 PROCEDURE — 99214 OFFICE O/P EST MOD 30 MIN: CPT | Performed by: NURSE PRACTITIONER

## 2024-07-23 PROCEDURE — 4004F PT TOBACCO SCREEN RCVD TLK: CPT | Performed by: NURSE PRACTITIONER

## 2024-07-23 RX ORDER — BACLOFEN 10 MG/1
10 TABLET ORAL 2 TIMES DAILY
Qty: 60 TABLET | Refills: 0 | Status: SHIPPED | OUTPATIENT
Start: 2024-07-23 | End: 2024-08-22

## 2024-07-23 RX ORDER — HYDROCODONE BITARTRATE AND ACETAMINOPHEN 7.5; 325 MG/1; MG/1
1 TABLET ORAL EVERY 6 HOURS PRN
Qty: 40 TABLET | Refills: 0 | Status: SHIPPED | OUTPATIENT
Start: 2024-07-23 | End: 2024-08-02

## 2024-07-23 RX ORDER — IBUPROFEN 800 MG/1
800 TABLET ORAL EVERY 8 HOURS PRN
Qty: 90 TABLET | Refills: 0 | Status: SHIPPED | OUTPATIENT
Start: 2024-07-23 | End: 2024-08-22

## 2024-07-23 RX ORDER — GABAPENTIN 300 MG/1
300 CAPSULE ORAL NIGHTLY
Qty: 30 CAPSULE | Refills: 0 | Status: SHIPPED | OUTPATIENT
Start: 2024-07-23 | End: 2024-08-22

## 2024-07-23 NOTE — PROGRESS NOTES
Vlad Power III  1970  [id number]    HISTORY OF PRESENT ILLNESS: Mr. Power is a 54 y.o. male returns for a follow up visit for pain management  He has a diagnosis of   1. Chronic pain syndrome    2. Lumbosacral spondylosis without myelopathy    3. Chronic bilateral low back pain with bilateral sciatica    4. Compression fracture of T12 vertebra, sequela    5. Back spasm    6. Cervicalgia    7. Chronic pain of left ankle    8. Class 2 obesity due to excess calories without serious comorbidity in adult, unspecified BMI    9. Encounter for therapeutic drug monitoring      As per Information Obtained from the PADT (Patient Assessment and Documentation Tool)    He complains of pain in the  mid and lower back, both upper arms, both legs, both fee  He rates the pain 8/10 and describes it as aching. Current treatment regimen has helped relieve about 70% of the pain.  He denies any side effects from the current pain regimen. Patient reports that since the last follow up visit the physical functioning is unchanged, family/social relationships are unchanged, mood is unchanged sleep patterns are unchanged, and that the overall functioning is unchanged.  Patient denies misusing/abusing his narcotic pain medications or using any illegal drugs.      Upon obtaining medical history from Mr. Power states that pain is manageable on current pain therapy. Takes the pain medications as prescribed. Mood/anxiety is stable. Sleep is fair with an average of 5-6 hours. Denies to having issues of constipation. Tolerating activities/house chores with moderate tenderness to the lower back. Working on smoking cessation    ALLERGIES: Patients list of allergies were reviewed     MEDICATIONS: Mr. Power list of medications were reviewed.His current medications are   Outpatient Medications Prior to Visit   Medication Sig Dispense Refill    naloxone (NARCAN) 4 MG/0.1ML LIQD nasal spray Use as directed 1 each 0    baclofen

## 2024-07-31 ENCOUNTER — TELEPHONE (OUTPATIENT)
Dept: PAIN MANAGEMENT | Age: 54
End: 2024-07-31

## 2024-07-31 DIAGNOSIS — M62.830 BACK SPASM: ICD-10-CM

## 2024-07-31 DIAGNOSIS — S22.080S COMPRESSION FRACTURE OF T12 VERTEBRA, SEQUELA: ICD-10-CM

## 2024-07-31 DIAGNOSIS — M47.817 LUMBOSACRAL SPONDYLOSIS WITHOUT MYELOPATHY: ICD-10-CM

## 2024-07-31 DIAGNOSIS — G89.29 CHRONIC BILATERAL LOW BACK PAIN WITH BILATERAL SCIATICA: ICD-10-CM

## 2024-07-31 DIAGNOSIS — G89.4 CHRONIC PAIN SYNDROME: Primary | ICD-10-CM

## 2024-07-31 DIAGNOSIS — M54.42 CHRONIC BILATERAL LOW BACK PAIN WITH BILATERAL SCIATICA: ICD-10-CM

## 2024-07-31 DIAGNOSIS — M54.41 CHRONIC BILATERAL LOW BACK PAIN WITH BILATERAL SCIATICA: ICD-10-CM

## 2024-08-01 NOTE — TELEPHONE ENCOUNTER
Tried to call patient couldn't LVM. Please be advised when patient calls please inform the patient that [facility] has sent his referral to [facility] for his feet.    Please be advised.

## 2024-08-02 DIAGNOSIS — M47.817 LUMBOSACRAL SPONDYLOSIS WITHOUT MYELOPATHY: ICD-10-CM

## 2024-08-02 DIAGNOSIS — G89.29 CHRONIC PAIN OF LEFT ANKLE: ICD-10-CM

## 2024-08-02 DIAGNOSIS — M62.830 BACK SPASM: ICD-10-CM

## 2024-08-02 DIAGNOSIS — M25.572 CHRONIC PAIN OF LEFT ANKLE: ICD-10-CM

## 2024-08-02 DIAGNOSIS — G89.29 CHRONIC BILATERAL LOW BACK PAIN WITH BILATERAL SCIATICA: ICD-10-CM

## 2024-08-02 DIAGNOSIS — G89.4 CHRONIC PAIN SYNDROME: ICD-10-CM

## 2024-08-02 DIAGNOSIS — M54.2 CERVICALGIA: ICD-10-CM

## 2024-08-02 DIAGNOSIS — M54.42 CHRONIC BILATERAL LOW BACK PAIN WITH BILATERAL SCIATICA: ICD-10-CM

## 2024-08-02 DIAGNOSIS — S22.080S COMPRESSION FRACTURE OF T12 VERTEBRA, SEQUELA: ICD-10-CM

## 2024-08-02 DIAGNOSIS — M54.41 CHRONIC BILATERAL LOW BACK PAIN WITH BILATERAL SCIATICA: ICD-10-CM

## 2024-08-05 RX ORDER — HYDROCODONE BITARTRATE AND ACETAMINOPHEN 7.5; 325 MG/1; MG/1
TABLET ORAL
Qty: 40 TABLET | Refills: 0 | OUTPATIENT
Start: 2024-08-05

## 2024-08-06 NOTE — TELEPHONE ENCOUNTER
Pt states the referral was not sent to [facility]. Pt also would like the result of his UDS.

## 2024-08-07 ENCOUNTER — TELEPHONE (OUTPATIENT)
Dept: PAIN MANAGEMENT | Age: 54
End: 2024-08-07

## 2024-08-07 DIAGNOSIS — S22.080S COMPRESSION FRACTURE OF T12 VERTEBRA, SEQUELA: ICD-10-CM

## 2024-08-07 DIAGNOSIS — M47.817 LUMBOSACRAL SPONDYLOSIS WITHOUT MYELOPATHY: ICD-10-CM

## 2024-08-07 DIAGNOSIS — M25.572 CHRONIC PAIN OF LEFT ANKLE: ICD-10-CM

## 2024-08-07 DIAGNOSIS — M62.830 BACK SPASM: ICD-10-CM

## 2024-08-07 DIAGNOSIS — M54.2 CERVICALGIA: ICD-10-CM

## 2024-08-07 DIAGNOSIS — M54.41 CHRONIC BILATERAL LOW BACK PAIN WITH BILATERAL SCIATICA: ICD-10-CM

## 2024-08-07 DIAGNOSIS — M54.42 CHRONIC BILATERAL LOW BACK PAIN WITH BILATERAL SCIATICA: ICD-10-CM

## 2024-08-07 DIAGNOSIS — G89.29 CHRONIC BILATERAL LOW BACK PAIN WITH BILATERAL SCIATICA: ICD-10-CM

## 2024-08-07 DIAGNOSIS — G89.29 CHRONIC PAIN OF LEFT ANKLE: ICD-10-CM

## 2024-08-07 DIAGNOSIS — G89.4 CHRONIC PAIN SYNDROME: ICD-10-CM

## 2024-08-07 RX ORDER — HYDROCODONE BITARTRATE AND ACETAMINOPHEN 5; 325 MG/1; MG/1
1 TABLET ORAL EVERY 6 HOURS PRN
Qty: 40 TABLET | Refills: 0 | Status: SHIPPED | OUTPATIENT
Start: 2024-08-07 | End: 2024-08-20 | Stop reason: SDUPTHER

## 2024-08-07 NOTE — TELEPHONE ENCOUNTER
Called patient to inform him that he has to come in the office tomorrow to provide a UDS sample, he has to  his prescription from the office. I also told the patient his referral has been sent over to [facility], he might want to call them to make sure they have it.

## 2024-08-07 NOTE — TELEPHONE ENCOUNTER
Tried calling patient couldn't leave a voice mail, Please be advised when patient calls please inform him that we keep trying to fax over his referral to [facility] the line is bust so we are going to keep trying to faxed to them.  Patient might have to call [facility] to see if they received his referral on July 31 st if not we will send another referral over.    Please be advised.

## 2024-08-07 NOTE — TELEPHONE ENCOUNTER
Who's asking for the refill request:patient      Reason for refill request:has been out of pain meds for 4 days      Why is patient out of medication?:ran out      Name of medication: hydrocodone      Pharmacy name: Pharmacy    [facility] [place] - [address] - P [phone] - F [phone]         Phone number: [phone]      Last refill:07/23/2024       Next appointment: 08/20/2024    Patient confirmed the above pharmacy has their medication in stock      yes

## 2024-08-08 NOTE — TELEPHONE ENCOUNTER
Pt came to [place] for his UDS I let him know he has to go to [place] and he said he was not going to drive over there.

## 2024-08-13 ENCOUNTER — TELEPHONE (OUTPATIENT)
Dept: PAIN MANAGEMENT | Age: 54
End: 2024-08-13

## 2024-08-13 NOTE — TELEPHONE ENCOUNTER
Pt called again asking if [facility] would call in a steroid pack due to his back being bad. He also stated that who he talked to earlier lied and said that [facility] was at  today and that is why he said he could not make it out there to do his urine screen. I told him he could have come to [facility] to do his UDS because there was medical staff hereon Monday, Tuesday and Wednesday. He said he came up to [place] on Thursday to try to do it and the door was locked. I explained to him that that is just our phone people here and not medical staff.

## 2024-08-14 NOTE — TELEPHONE ENCOUNTER
Called patient to inform him that he has to give a UDS sample so he can get his medications, the patient stated that he will come on Tuesday when he has an appointment with [facility]. I also told the patient that [facility] will not be sending any medrol markel to the pharmacy.

## 2024-08-20 ENCOUNTER — OFFICE VISIT (OUTPATIENT)
Dept: PAIN MANAGEMENT | Age: 54
End: 2024-08-20

## 2024-08-20 VITALS
TEMPERATURE: 98.7 F | HEART RATE: 94 BPM | SYSTOLIC BLOOD PRESSURE: 135 MMHG | BODY MASS INDEX: 32.71 KG/M2 | DIASTOLIC BLOOD PRESSURE: 79 MMHG | OXYGEN SATURATION: 96 % | WEIGHT: 228 LBS

## 2024-08-20 DIAGNOSIS — E66.09 CLASS 2 OBESITY DUE TO EXCESS CALORIES WITHOUT SERIOUS COMORBIDITY IN ADULT, UNSPECIFIED BMI: ICD-10-CM

## 2024-08-20 DIAGNOSIS — M47.817 LUMBOSACRAL SPONDYLOSIS WITHOUT MYELOPATHY: ICD-10-CM

## 2024-08-20 DIAGNOSIS — M54.2 CERVICALGIA: ICD-10-CM

## 2024-08-20 DIAGNOSIS — G89.4 CHRONIC PAIN SYNDROME: ICD-10-CM

## 2024-08-20 DIAGNOSIS — M54.41 CHRONIC BILATERAL LOW BACK PAIN WITH BILATERAL SCIATICA: ICD-10-CM

## 2024-08-20 DIAGNOSIS — S22.080S COMPRESSION FRACTURE OF T12 VERTEBRA, SEQUELA: ICD-10-CM

## 2024-08-20 DIAGNOSIS — G89.29 CHRONIC PAIN OF LEFT ANKLE: ICD-10-CM

## 2024-08-20 DIAGNOSIS — M62.830 BACK SPASM: ICD-10-CM

## 2024-08-20 DIAGNOSIS — G89.29 CHRONIC BILATERAL LOW BACK PAIN WITH BILATERAL SCIATICA: ICD-10-CM

## 2024-08-20 DIAGNOSIS — M25.572 CHRONIC PAIN OF LEFT ANKLE: ICD-10-CM

## 2024-08-20 DIAGNOSIS — M54.42 CHRONIC BILATERAL LOW BACK PAIN WITH BILATERAL SCIATICA: ICD-10-CM

## 2024-08-20 DIAGNOSIS — Z51.81 ENCOUNTER FOR THERAPEUTIC DRUG MONITORING: ICD-10-CM

## 2024-08-20 RX ORDER — HYDROCODONE BITARTRATE AND ACETAMINOPHEN 5; 325 MG/1; MG/1
1 TABLET ORAL EVERY 6 HOURS PRN
Qty: 40 TABLET | Refills: 0 | Status: SHIPPED | OUTPATIENT
Start: 2024-08-20 | End: 2024-08-30

## 2024-08-20 RX ORDER — IBUPROFEN 800 MG/1
800 TABLET ORAL EVERY 8 HOURS PRN
Qty: 90 TABLET | Refills: 0 | Status: SHIPPED | OUTPATIENT
Start: 2024-08-20 | End: 2024-09-19

## 2024-08-20 NOTE — PROGRESS NOTES
as biofeedback, mindfulness based stress reduction, imagery, cognitive restructuring, problem solving discussed with patient   -He was advised weight reduction, diet changes- 800-1200 jen diet, diet diary, exercising, nutritional  consult increased physical activity as tolerated   -Last UDS 7/23/24 consistent for ETOH/UDS today  -Return in about 4 weeks (around 9/17/2024).     Analgesic Plan:              Continue present regimen: Yes: Norco 7. mg tabs orally q6h prn              Adjust dose of present analgesic: No              Switch analgesics: No              Add/Adjust concomitant therapy: No: continue with Motrin, Narcan, Neurontin, Lidocaine    I will continue his current medication regimen  which is part of the above treatment schedule. It has been helping with Mr. Power's chronic  medical problems which for this visit include:   Diagnoses of Chronic pain syndrome, Lumbosacral spondylosis without myelopathy, Chronic bilateral low back pain with bilateral sciatica, Compression fracture of T12 vertebra, sequela, Back spasm, Cervicalgia, Chronic pain of left ankle, Class 2 obesity due to excess calories without serious comorbidity in adult, unspecified BMI, and Encounter for therapeutic drug monitoring were pertinent to this visit.   Risks and benefits of the medications and other alternative treatments  including no treatment were discussed with the patient.The common side effects of these medications were also explained to the patient.  Informed verbal consent was obtained.   Goals of current treatment regimen include improvement in pain, restoration of functioning- with focus on improvement in physical performance, general activity, work or disability,emotional distress, health care utilization and  decreased medication consumption. Will continue to monitor progress towards achieving/maintaining therapeutic goals with special emphasis on  1. Improvement in perceived interfernce  of pain with ADL's.

## 2024-08-26 ENCOUNTER — TELEPHONE (OUTPATIENT)
Dept: PAIN MANAGEMENT | Age: 54
End: 2024-08-26

## 2024-08-26 DIAGNOSIS — G89.29 CHRONIC PAIN OF LEFT ANKLE: ICD-10-CM

## 2024-08-26 DIAGNOSIS — G89.29 CHRONIC BILATERAL LOW BACK PAIN WITH BILATERAL SCIATICA: ICD-10-CM

## 2024-08-26 DIAGNOSIS — M54.2 CERVICALGIA: ICD-10-CM

## 2024-08-26 DIAGNOSIS — M54.41 CHRONIC BILATERAL LOW BACK PAIN WITH BILATERAL SCIATICA: ICD-10-CM

## 2024-08-26 DIAGNOSIS — M25.572 CHRONIC PAIN OF LEFT ANKLE: ICD-10-CM

## 2024-08-26 DIAGNOSIS — M47.817 LUMBOSACRAL SPONDYLOSIS WITHOUT MYELOPATHY: ICD-10-CM

## 2024-08-26 DIAGNOSIS — M62.830 BACK SPASM: ICD-10-CM

## 2024-08-26 DIAGNOSIS — M54.42 CHRONIC BILATERAL LOW BACK PAIN WITH BILATERAL SCIATICA: ICD-10-CM

## 2024-08-26 DIAGNOSIS — G89.4 CHRONIC PAIN SYNDROME: ICD-10-CM

## 2024-08-26 DIAGNOSIS — S22.080S COMPRESSION FRACTURE OF T12 VERTEBRA, SEQUELA: ICD-10-CM

## 2024-08-26 RX ORDER — HYDROCODONE BITARTRATE AND ACETAMINOPHEN 5; 325 MG/1; MG/1
1 TABLET ORAL EVERY 6 HOURS PRN
Qty: 80 TABLET | Refills: 0 | Status: SHIPPED | OUTPATIENT
Start: 2024-08-26 | End: 2024-09-15

## 2024-08-26 NOTE — TELEPHONE ENCOUNTER
Tried to call patient mailbox was full. Please be advised when patient calls please inform him the reason why is Norco was switched from 7.5 to 5-325 due to the fact he had alcohol in his system. She also sent him Norco medication to his preferred pharmacy requested.    Please be advised.

## 2024-08-29 DIAGNOSIS — M47.817 LUMBOSACRAL SPONDYLOSIS WITHOUT MYELOPATHY: ICD-10-CM

## 2024-08-29 DIAGNOSIS — M54.2 CERVICALGIA: ICD-10-CM

## 2024-08-29 DIAGNOSIS — G89.4 CHRONIC PAIN SYNDROME: ICD-10-CM

## 2024-08-29 DIAGNOSIS — M62.830 BACK SPASM: ICD-10-CM

## 2024-08-29 DIAGNOSIS — S22.080S COMPRESSION FRACTURE OF T12 VERTEBRA, SEQUELA: ICD-10-CM

## 2024-08-29 DIAGNOSIS — M25.572 CHRONIC PAIN OF LEFT ANKLE: ICD-10-CM

## 2024-08-29 DIAGNOSIS — G89.29 CHRONIC PAIN OF LEFT ANKLE: ICD-10-CM

## 2024-08-29 DIAGNOSIS — G89.29 CHRONIC BILATERAL LOW BACK PAIN WITH BILATERAL SCIATICA: ICD-10-CM

## 2024-08-29 DIAGNOSIS — M54.42 CHRONIC BILATERAL LOW BACK PAIN WITH BILATERAL SCIATICA: ICD-10-CM

## 2024-08-29 DIAGNOSIS — M54.41 CHRONIC BILATERAL LOW BACK PAIN WITH BILATERAL SCIATICA: ICD-10-CM

## 2024-09-03 RX ORDER — HYDROCODONE BITARTRATE AND ACETAMINOPHEN 5; 325 MG/1; MG/1
TABLET ORAL
Qty: 40 TABLET | Refills: 0 | OUTPATIENT
Start: 2024-09-03

## 2024-09-17 ENCOUNTER — OFFICE VISIT (OUTPATIENT)
Dept: PAIN MANAGEMENT | Age: 54
End: 2024-09-17

## 2024-09-17 VITALS
WEIGHT: 228 LBS | TEMPERATURE: 98.8 F | OXYGEN SATURATION: 95 % | HEART RATE: 92 BPM | DIASTOLIC BLOOD PRESSURE: 74 MMHG | BODY MASS INDEX: 32.71 KG/M2 | SYSTOLIC BLOOD PRESSURE: 115 MMHG

## 2024-09-17 DIAGNOSIS — M54.42 CHRONIC BILATERAL LOW BACK PAIN WITH BILATERAL SCIATICA: ICD-10-CM

## 2024-09-17 DIAGNOSIS — G89.29 CHRONIC BILATERAL LOW BACK PAIN WITH BILATERAL SCIATICA: ICD-10-CM

## 2024-09-17 DIAGNOSIS — M62.830 BACK SPASM: ICD-10-CM

## 2024-09-17 DIAGNOSIS — G89.29 CHRONIC PAIN OF LEFT ANKLE: ICD-10-CM

## 2024-09-17 DIAGNOSIS — M25.572 CHRONIC PAIN OF LEFT ANKLE: ICD-10-CM

## 2024-09-17 DIAGNOSIS — M54.41 CHRONIC BILATERAL LOW BACK PAIN WITH BILATERAL SCIATICA: ICD-10-CM

## 2024-09-17 DIAGNOSIS — M54.2 CERVICALGIA: ICD-10-CM

## 2024-09-17 DIAGNOSIS — M47.817 LUMBOSACRAL SPONDYLOSIS WITHOUT MYELOPATHY: ICD-10-CM

## 2024-09-17 DIAGNOSIS — S22.080S COMPRESSION FRACTURE OF T12 VERTEBRA, SEQUELA: ICD-10-CM

## 2024-09-17 DIAGNOSIS — G89.4 CHRONIC PAIN SYNDROME: ICD-10-CM

## 2024-09-17 DIAGNOSIS — E66.09 CLASS 2 OBESITY DUE TO EXCESS CALORIES WITHOUT SERIOUS COMORBIDITY IN ADULT, UNSPECIFIED BMI: ICD-10-CM

## 2024-09-17 RX ORDER — GABAPENTIN 300 MG/1
300 CAPSULE ORAL NIGHTLY
Qty: 30 CAPSULE | Refills: 0 | Status: SHIPPED | OUTPATIENT
Start: 2024-09-17 | End: 2024-10-17

## 2024-09-17 RX ORDER — BACLOFEN 10 MG/1
10 TABLET ORAL 2 TIMES DAILY
Qty: 60 TABLET | Refills: 0 | Status: SHIPPED | OUTPATIENT
Start: 2024-09-17 | End: 2024-10-17

## 2024-09-17 RX ORDER — HYDROCODONE BITARTRATE AND ACETAMINOPHEN 7.5; 325 MG/1; MG/1
1 TABLET ORAL EVERY 6 HOURS PRN
Qty: 112 TABLET | Refills: 0 | Status: SHIPPED | OUTPATIENT
Start: 2024-09-17 | End: 2024-10-15

## 2024-10-09 DIAGNOSIS — G89.29 CHRONIC BILATERAL LOW BACK PAIN WITH BILATERAL SCIATICA: ICD-10-CM

## 2024-10-09 DIAGNOSIS — M54.41 CHRONIC BILATERAL LOW BACK PAIN WITH BILATERAL SCIATICA: ICD-10-CM

## 2024-10-09 DIAGNOSIS — S22.080S COMPRESSION FRACTURE OF T12 VERTEBRA, SEQUELA: ICD-10-CM

## 2024-10-09 DIAGNOSIS — M54.42 CHRONIC BILATERAL LOW BACK PAIN WITH BILATERAL SCIATICA: ICD-10-CM

## 2024-10-09 DIAGNOSIS — G89.4 CHRONIC PAIN SYNDROME: ICD-10-CM

## 2024-10-09 DIAGNOSIS — M47.817 LUMBOSACRAL SPONDYLOSIS WITHOUT MYELOPATHY: ICD-10-CM

## 2024-10-14 RX ORDER — LIDOCAINE 36 MG/1
PATCH TOPICAL
Qty: 30 PATCH | Refills: 0 | OUTPATIENT
Start: 2024-10-14

## 2024-10-15 ENCOUNTER — OFFICE VISIT (OUTPATIENT)
Dept: PAIN MANAGEMENT | Age: 54
End: 2024-10-15

## 2024-10-15 VITALS
SYSTOLIC BLOOD PRESSURE: 134 MMHG | OXYGEN SATURATION: 96 % | DIASTOLIC BLOOD PRESSURE: 76 MMHG | TEMPERATURE: 96.8 F | HEART RATE: 89 BPM

## 2024-10-15 DIAGNOSIS — G89.4 CHRONIC PAIN SYNDROME: ICD-10-CM

## 2024-10-15 DIAGNOSIS — M54.42 CHRONIC BILATERAL LOW BACK PAIN WITH BILATERAL SCIATICA: ICD-10-CM

## 2024-10-15 DIAGNOSIS — G89.29 CHRONIC PAIN OF LEFT ANKLE: ICD-10-CM

## 2024-10-15 DIAGNOSIS — M54.41 CHRONIC BILATERAL LOW BACK PAIN WITH BILATERAL SCIATICA: ICD-10-CM

## 2024-10-15 DIAGNOSIS — M47.817 LUMBOSACRAL SPONDYLOSIS WITHOUT MYELOPATHY: ICD-10-CM

## 2024-10-15 DIAGNOSIS — M25.572 CHRONIC PAIN OF LEFT ANKLE: ICD-10-CM

## 2024-10-15 DIAGNOSIS — G89.29 CHRONIC BILATERAL LOW BACK PAIN WITH BILATERAL SCIATICA: ICD-10-CM

## 2024-10-15 DIAGNOSIS — M62.830 BACK SPASM: ICD-10-CM

## 2024-10-15 DIAGNOSIS — S22.080S COMPRESSION FRACTURE OF T12 VERTEBRA, SEQUELA: ICD-10-CM

## 2024-10-15 DIAGNOSIS — E66.09 CLASS 2 OBESITY DUE TO EXCESS CALORIES WITHOUT SERIOUS COMORBIDITY IN ADULT, UNSPECIFIED BMI: ICD-10-CM

## 2024-10-15 DIAGNOSIS — M54.2 CERVICALGIA: ICD-10-CM

## 2024-10-15 DIAGNOSIS — E66.812 CLASS 2 OBESITY DUE TO EXCESS CALORIES WITHOUT SERIOUS COMORBIDITY IN ADULT, UNSPECIFIED BMI: ICD-10-CM

## 2024-10-15 RX ORDER — BACLOFEN 10 MG/1
10 TABLET ORAL 2 TIMES DAILY
Qty: 60 TABLET | Refills: 0 | Status: SHIPPED | OUTPATIENT
Start: 2024-10-15 | End: 2024-11-14

## 2024-10-15 RX ORDER — HYDROCODONE BITARTRATE AND ACETAMINOPHEN 7.5; 325 MG/1; MG/1
1 TABLET ORAL EVERY 6 HOURS PRN
Qty: 112 TABLET | Refills: 0 | Status: SHIPPED | OUTPATIENT
Start: 2024-10-15 | End: 2024-11-12

## 2024-10-15 RX ORDER — IBUPROFEN 800 MG/1
800 TABLET, FILM COATED ORAL EVERY 8 HOURS PRN
Qty: 90 TABLET | Refills: 0 | Status: SHIPPED | OUTPATIENT
Start: 2024-10-15 | End: 2024-11-14

## 2024-10-15 NOTE — PROGRESS NOTES
Vlad Power III  1970  [id number]    HISTORY OF PRESENT ILLNESS: Mr. Power is a 54 y.o. male returns for a follow up visit for pain management  He has a diagnosis of   1. Chronic pain syndrome    2. Compression fracture of T12 vertebra, sequela    3. Lumbosacral spondylosis without myelopathy    4. Chronic bilateral low back pain with bilateral sciatica    5. Back spasm    6. Cervicalgia    7. Chronic pain of left ankle    8. Class 2 obesity due to excess calories without serious comorbidity in adult, unspecified BMI      As per Information Obtained from the PADT (Patient Assessment and Documentation Tool)    He complains of pain in the  down the spine, tail bone, both legs  He rates the pain 7/10 and describes it as aching. Current treatment regimen has helped relieve about 30% of the pain.  He denies any side effects from the current pain regimen. Patient reports that since the last follow up visit the physical functioning is unchanged, family/social relationships are unchanged, mood is unchanged sleep patterns are unchanged, and that the overall functioning is unchanged.  Patient denies misusing/abusing his narcotic pain medications or using any illegal drugs.      Upon obtaining medical history from Mr. Power states that pain is manageable on current pain therapy. Takes the pain medications as prescribed. Mood/anxiety is stable. Sleep is fair with an average of 5-6 hours. Denies to having issues of constipation. Tolerating activities/house chores with moderate tenderness to the lower back.     ALLERGIES: Patients list of allergies were reviewed     MEDICATIONS: Mr. Power list of medications were reviewed.His current medications are   Outpatient Medications Prior to Visit   Medication Sig Dispense Refill    naloxone (NARCAN) 4 MG/0.1ML LIQD nasal spray Use as directed 1 each 0    baclofen (LIORESAL) 10 MG tablet Take 1 tablet by mouth 2 times daily 60 tablet 0    gabapentin (NEURONTIN)

## 2024-11-12 ENCOUNTER — OFFICE VISIT (OUTPATIENT)
Dept: PAIN MANAGEMENT | Age: 54
End: 2024-11-12

## 2024-11-12 VITALS — SYSTOLIC BLOOD PRESSURE: 151 MMHG | DIASTOLIC BLOOD PRESSURE: 91 MMHG | OXYGEN SATURATION: 96 % | HEART RATE: 105 BPM

## 2024-11-12 DIAGNOSIS — S22.080S COMPRESSION FRACTURE OF T12 VERTEBRA, SEQUELA: ICD-10-CM

## 2024-11-12 DIAGNOSIS — M54.41 CHRONIC BILATERAL LOW BACK PAIN WITH BILATERAL SCIATICA: ICD-10-CM

## 2024-11-12 DIAGNOSIS — G89.29 CHRONIC BILATERAL LOW BACK PAIN WITH BILATERAL SCIATICA: ICD-10-CM

## 2024-11-12 DIAGNOSIS — E66.812 CLASS 2 OBESITY DUE TO EXCESS CALORIES WITHOUT SERIOUS COMORBIDITY IN ADULT, UNSPECIFIED BMI: ICD-10-CM

## 2024-11-12 DIAGNOSIS — M25.572 CHRONIC PAIN OF LEFT ANKLE: ICD-10-CM

## 2024-11-12 DIAGNOSIS — M54.2 CERVICALGIA: ICD-10-CM

## 2024-11-12 DIAGNOSIS — M62.830 BACK SPASM: ICD-10-CM

## 2024-11-12 DIAGNOSIS — M54.42 CHRONIC BILATERAL LOW BACK PAIN WITH BILATERAL SCIATICA: ICD-10-CM

## 2024-11-12 DIAGNOSIS — E66.09 CLASS 2 OBESITY DUE TO EXCESS CALORIES WITHOUT SERIOUS COMORBIDITY IN ADULT, UNSPECIFIED BMI: ICD-10-CM

## 2024-11-12 DIAGNOSIS — G89.4 CHRONIC PAIN SYNDROME: ICD-10-CM

## 2024-11-12 DIAGNOSIS — M47.817 LUMBOSACRAL SPONDYLOSIS WITHOUT MYELOPATHY: ICD-10-CM

## 2024-11-12 DIAGNOSIS — G89.29 CHRONIC PAIN OF LEFT ANKLE: ICD-10-CM

## 2024-11-12 RX ORDER — IBUPROFEN 800 MG/1
800 TABLET, FILM COATED ORAL EVERY 8 HOURS PRN
Qty: 90 TABLET | Refills: 0 | Status: SHIPPED | OUTPATIENT
Start: 2024-11-12 | End: 2024-12-12

## 2024-11-12 RX ORDER — HYDROCODONE BITARTRATE AND ACETAMINOPHEN 7.5; 325 MG/1; MG/1
1 TABLET ORAL EVERY 6 HOURS PRN
Qty: 112 TABLET | Refills: 0 | Status: SHIPPED | OUTPATIENT
Start: 2024-11-12 | End: 2024-12-10

## 2024-11-12 RX ORDER — BACLOFEN 10 MG/1
10 TABLET ORAL 2 TIMES DAILY
Qty: 60 TABLET | Refills: 0 | Status: SHIPPED | OUTPATIENT
Start: 2024-11-12 | End: 2024-12-12

## 2024-11-12 NOTE — PROGRESS NOTES
Vlad Power III  1970  [id number]    HISTORY OF PRESENT ILLNESS: Mr. Power is a 54 y.o. male returns for a follow up visit for pain management  He has a diagnosis of   1. Chronic pain syndrome    2. Compression fracture of T12 vertebra, sequela    3. Lumbosacral spondylosis without myelopathy    4. Chronic bilateral low back pain with bilateral sciatica    5. Back spasm    6. Cervicalgia    7. Chronic pain of left ankle    8. Class 2 obesity due to excess calories without serious comorbidity in adult, unspecified BMI      As per Information Obtained from the PADT (Patient Assessment and Documentation Tool)    He complains of pain in the  down the spin, tail bone, both legs, both feet  He rates the pain 8/10 and describes it as aching. Current treatment regimen has helped relieve about 50% of the pain.  He denies any side effects from the current pain regimen. Patient reports that since the last follow up visit the physical functioning is unchanged, family/social relationships are unchanged, mood is unchanged sleep patterns are unchanged, and that the overall functioning is unchanged.  Patient denies misusing/abusing his narcotic pain medications or using any illegal drugs.      Upon obtaining medical history from Mr. Power states that pain is manageable on current pain therapy. Takes the pain medications as prescribed. Mood/anxiety is stable. Sleep is fair with an average of 5-6 hours. Denies to having issues of constipation. Tolerating activities/house chores with moderate tenderness to the lower back. Working on smoking cessation    ALLERGIES: Patients list of allergies were reviewed     MEDICATIONS: Mr. Power list of medications were reviewed.His current medications are   Outpatient Medications Prior to Visit   Medication Sig Dispense Refill    naloxone (NARCAN) 4 MG/0.1ML LIQD nasal spray Use as directed 1 each 0    baclofen (LIORESAL) 10 MG tablet Take 1 tablet by mouth 2 times daily 60

## 2024-12-10 ENCOUNTER — OFFICE VISIT (OUTPATIENT)
Dept: PAIN MANAGEMENT | Age: 54
End: 2024-12-10
Payer: COMMERCIAL

## 2024-12-10 VITALS — OXYGEN SATURATION: 96 % | SYSTOLIC BLOOD PRESSURE: 158 MMHG | DIASTOLIC BLOOD PRESSURE: 88 MMHG | HEART RATE: 99 BPM

## 2024-12-10 DIAGNOSIS — E66.812 CLASS 2 OBESITY DUE TO EXCESS CALORIES WITHOUT SERIOUS COMORBIDITY IN ADULT, UNSPECIFIED BMI: ICD-10-CM

## 2024-12-10 DIAGNOSIS — M25.572 CHRONIC PAIN OF LEFT ANKLE: ICD-10-CM

## 2024-12-10 DIAGNOSIS — M54.42 CHRONIC BILATERAL LOW BACK PAIN WITH BILATERAL SCIATICA: ICD-10-CM

## 2024-12-10 DIAGNOSIS — S22.080S COMPRESSION FRACTURE OF T12 VERTEBRA, SEQUELA: ICD-10-CM

## 2024-12-10 DIAGNOSIS — G89.29 CHRONIC BILATERAL LOW BACK PAIN WITH BILATERAL SCIATICA: ICD-10-CM

## 2024-12-10 DIAGNOSIS — G89.29 CHRONIC PAIN OF LEFT ANKLE: ICD-10-CM

## 2024-12-10 DIAGNOSIS — M54.41 CHRONIC BILATERAL LOW BACK PAIN WITH BILATERAL SCIATICA: ICD-10-CM

## 2024-12-10 DIAGNOSIS — M54.2 CERVICALGIA: ICD-10-CM

## 2024-12-10 DIAGNOSIS — M62.830 BACK SPASM: ICD-10-CM

## 2024-12-10 DIAGNOSIS — G89.4 CHRONIC PAIN SYNDROME: ICD-10-CM

## 2024-12-10 DIAGNOSIS — M47.817 LUMBOSACRAL SPONDYLOSIS WITHOUT MYELOPATHY: ICD-10-CM

## 2024-12-10 DIAGNOSIS — E66.09 CLASS 2 OBESITY DUE TO EXCESS CALORIES WITHOUT SERIOUS COMORBIDITY IN ADULT, UNSPECIFIED BMI: ICD-10-CM

## 2024-12-10 PROCEDURE — 3017F COLORECTAL CA SCREEN DOC REV: CPT | Performed by: NURSE PRACTITIONER

## 2024-12-10 PROCEDURE — 99214 OFFICE O/P EST MOD 30 MIN: CPT | Performed by: NURSE PRACTITIONER

## 2024-12-10 PROCEDURE — G8427 DOCREV CUR MEDS BY ELIG CLIN: HCPCS | Performed by: NURSE PRACTITIONER

## 2024-12-10 PROCEDURE — G8484 FLU IMMUNIZE NO ADMIN: HCPCS | Performed by: NURSE PRACTITIONER

## 2024-12-10 PROCEDURE — 4004F PT TOBACCO SCREEN RCVD TLK: CPT | Performed by: NURSE PRACTITIONER

## 2024-12-10 PROCEDURE — G8417 CALC BMI ABV UP PARAM F/U: HCPCS | Performed by: NURSE PRACTITIONER

## 2024-12-10 RX ORDER — HYDROCODONE BITARTRATE AND ACETAMINOPHEN 7.5; 325 MG/1; MG/1
1 TABLET ORAL EVERY 6 HOURS PRN
Qty: 112 TABLET | Refills: 0 | Status: SHIPPED | OUTPATIENT
Start: 2024-12-10 | End: 2025-01-07

## 2024-12-10 RX ORDER — IBUPROFEN 800 MG/1
800 TABLET, FILM COATED ORAL EVERY 8 HOURS PRN
Qty: 90 TABLET | Refills: 0 | Status: SHIPPED | OUTPATIENT
Start: 2024-12-10 | End: 2025-01-09

## 2024-12-10 RX ORDER — BACLOFEN 10 MG/1
10 TABLET ORAL 2 TIMES DAILY
Qty: 60 TABLET | Refills: 0 | Status: SHIPPED | OUTPATIENT
Start: 2024-12-10 | End: 2025-01-09

## 2025-01-06 DIAGNOSIS — G89.4 CHRONIC PAIN SYNDROME: ICD-10-CM

## 2025-01-06 DIAGNOSIS — M54.41 CHRONIC BILATERAL LOW BACK PAIN WITH BILATERAL SCIATICA: ICD-10-CM

## 2025-01-06 DIAGNOSIS — M62.830 BACK SPASM: ICD-10-CM

## 2025-01-06 DIAGNOSIS — M25.572 CHRONIC PAIN OF LEFT ANKLE: ICD-10-CM

## 2025-01-06 DIAGNOSIS — M47.817 LUMBOSACRAL SPONDYLOSIS WITHOUT MYELOPATHY: ICD-10-CM

## 2025-01-06 DIAGNOSIS — S22.080S COMPRESSION FRACTURE OF T12 VERTEBRA, SEQUELA: ICD-10-CM

## 2025-01-06 DIAGNOSIS — M54.2 CERVICALGIA: ICD-10-CM

## 2025-01-06 DIAGNOSIS — G89.29 CHRONIC BILATERAL LOW BACK PAIN WITH BILATERAL SCIATICA: ICD-10-CM

## 2025-01-06 DIAGNOSIS — G89.29 CHRONIC PAIN OF LEFT ANKLE: ICD-10-CM

## 2025-01-06 DIAGNOSIS — M54.42 CHRONIC BILATERAL LOW BACK PAIN WITH BILATERAL SCIATICA: ICD-10-CM

## 2025-01-07 ENCOUNTER — TELEPHONE (OUTPATIENT)
Dept: PAIN MANAGEMENT | Age: 55
End: 2025-01-07

## 2025-01-07 DIAGNOSIS — M25.572 CHRONIC PAIN OF LEFT ANKLE: ICD-10-CM

## 2025-01-07 DIAGNOSIS — G89.29 CHRONIC BILATERAL LOW BACK PAIN WITH BILATERAL SCIATICA: ICD-10-CM

## 2025-01-07 DIAGNOSIS — G89.29 CHRONIC PAIN OF LEFT ANKLE: ICD-10-CM

## 2025-01-07 DIAGNOSIS — M54.41 CHRONIC BILATERAL LOW BACK PAIN WITH BILATERAL SCIATICA: ICD-10-CM

## 2025-01-07 DIAGNOSIS — M54.42 CHRONIC BILATERAL LOW BACK PAIN WITH BILATERAL SCIATICA: ICD-10-CM

## 2025-01-07 DIAGNOSIS — M47.817 LUMBOSACRAL SPONDYLOSIS WITHOUT MYELOPATHY: ICD-10-CM

## 2025-01-07 DIAGNOSIS — S22.080S COMPRESSION FRACTURE OF T12 VERTEBRA, SEQUELA: ICD-10-CM

## 2025-01-07 DIAGNOSIS — M54.2 CERVICALGIA: ICD-10-CM

## 2025-01-07 DIAGNOSIS — M62.830 BACK SPASM: ICD-10-CM

## 2025-01-07 DIAGNOSIS — G89.4 CHRONIC PAIN SYNDROME: ICD-10-CM

## 2025-01-07 RX ORDER — BACLOFEN 10 MG/1
10 TABLET ORAL 2 TIMES DAILY
Qty: 60 TABLET | Refills: 0 | Status: SHIPPED | OUTPATIENT
Start: 2025-01-07 | End: 2025-01-14 | Stop reason: SDUPTHER

## 2025-01-07 RX ORDER — HYDROCODONE BITARTRATE AND ACETAMINOPHEN 7.5; 325 MG/1; MG/1
1 TABLET ORAL EVERY 6 HOURS PRN
Qty: 112 TABLET | Refills: 0 | OUTPATIENT
Start: 2025-01-07 | End: 2025-02-04

## 2025-01-07 RX ORDER — HYDROCODONE BITARTRATE AND ACETAMINOPHEN 7.5; 325 MG/1; MG/1
1 TABLET ORAL EVERY 6 HOURS PRN
Qty: 28 TABLET | Refills: 0 | Status: SHIPPED | OUTPATIENT
Start: 2025-01-07 | End: 2025-01-14 | Stop reason: SDUPTHER

## 2025-01-07 NOTE — TELEPHONE ENCOUNTER
Pt is calling and requesting partial refill until fu on 1/14/2025 due to being scheduled at 5 weeks instead of 4.    Pt is requesting call when medication is sent in.      HYDROcodone-acetaminophen (NORCO) 7.5-325 MG per tablet     [facility]  [id number]

## 2025-01-14 ENCOUNTER — OFFICE VISIT (OUTPATIENT)
Dept: PAIN MANAGEMENT | Age: 55
End: 2025-01-14

## 2025-01-14 VITALS — DIASTOLIC BLOOD PRESSURE: 96 MMHG | OXYGEN SATURATION: 97 % | HEART RATE: 95 BPM | SYSTOLIC BLOOD PRESSURE: 143 MMHG

## 2025-01-14 DIAGNOSIS — M54.41 CHRONIC BILATERAL LOW BACK PAIN WITH BILATERAL SCIATICA: ICD-10-CM

## 2025-01-14 DIAGNOSIS — M54.42 CHRONIC BILATERAL LOW BACK PAIN WITH BILATERAL SCIATICA: ICD-10-CM

## 2025-01-14 DIAGNOSIS — G89.29 CHRONIC PAIN OF LEFT ANKLE: ICD-10-CM

## 2025-01-14 DIAGNOSIS — G89.4 CHRONIC PAIN SYNDROME: ICD-10-CM

## 2025-01-14 DIAGNOSIS — M25.572 CHRONIC PAIN OF LEFT ANKLE: ICD-10-CM

## 2025-01-14 DIAGNOSIS — G89.29 CHRONIC BILATERAL LOW BACK PAIN WITH BILATERAL SCIATICA: ICD-10-CM

## 2025-01-14 DIAGNOSIS — M47.817 LUMBOSACRAL SPONDYLOSIS WITHOUT MYELOPATHY: ICD-10-CM

## 2025-01-14 DIAGNOSIS — M54.2 CERVICALGIA: ICD-10-CM

## 2025-01-14 DIAGNOSIS — M62.830 BACK SPASM: ICD-10-CM

## 2025-01-14 DIAGNOSIS — E66.812 CLASS 2 OBESITY DUE TO EXCESS CALORIES WITHOUT SERIOUS COMORBIDITY IN ADULT, UNSPECIFIED BMI: ICD-10-CM

## 2025-01-14 DIAGNOSIS — S22.080S COMPRESSION FRACTURE OF T12 VERTEBRA, SEQUELA: ICD-10-CM

## 2025-01-14 DIAGNOSIS — E66.09 CLASS 2 OBESITY DUE TO EXCESS CALORIES WITHOUT SERIOUS COMORBIDITY IN ADULT, UNSPECIFIED BMI: ICD-10-CM

## 2025-01-14 RX ORDER — IBUPROFEN 800 MG/1
800 TABLET, FILM COATED ORAL EVERY 8 HOURS PRN
Qty: 90 TABLET | Refills: 0 | Status: SHIPPED | OUTPATIENT
Start: 2025-01-14 | End: 2025-02-13

## 2025-01-14 RX ORDER — BACLOFEN 10 MG/1
10 TABLET ORAL 2 TIMES DAILY
Qty: 60 TABLET | Refills: 0 | Status: SHIPPED | OUTPATIENT
Start: 2025-01-14 | End: 2025-02-13

## 2025-01-14 RX ORDER — HYDROCODONE BITARTRATE AND ACETAMINOPHEN 7.5; 325 MG/1; MG/1
1 TABLET ORAL EVERY 6 HOURS PRN
Qty: 28 TABLET | Refills: 0 | Status: SHIPPED | OUTPATIENT
Start: 2025-01-14 | End: 2025-01-15 | Stop reason: SDUPTHER

## 2025-01-14 NOTE — PROGRESS NOTES
Vlad Power III  1970  [id number]    HISTORY OF PRESENT ILLNESS: Mr. Power is a 54 y.o. male returns for a follow up visit for pain management  He has a diagnosis of   1. Chronic pain syndrome    2. Compression fracture of T12 vertebra, sequela    3. Lumbosacral spondylosis without myelopathy    4. Chronic bilateral low back pain with bilateral sciatica    5. Back spasm    6. Cervicalgia    7. Chronic pain of left ankle    8. Class 2 obesity due to excess calories without serious comorbidity in adult, unspecified BMI      As per Information Obtained from the PADT (Patient Assessment and Documentation Tool)    He complains of pain in the  down the spin, both legs, both feet  He rates the pain 8/10 and describes it as aching. Current treatment regimen has helped relieve about 40% of the pain.  He denies any side effects from the current pain regimen. Patient reports that since the last follow up visit the physical functioning is unchanged, family/social relationships are unchanged, mood is unchanged sleep patterns are unchanged, and that the overall functioning is unchanged.  Patient denies misusing/abusing his narcotic pain medications or using any illegal drugs.      Upon obtaining medical history from Mr. Power states that pain is manageable on current pain therapy. Takes the pain medications as prescribed. Mood/anxiety is stable. Sleep is fair with an average of 5-6 hours. Denies to having issues of constipation. Tolerating activities/house chores with moderate tenderness to the lower back. Working on smoking cessation    ALLERGIES: Patients list of allergies were reviewed     MEDICATIONS: Mr. Power list of medications were reviewed.His current medications are   Outpatient Medications Prior to Visit   Medication Sig Dispense Refill    HYDROcodone-acetaminophen (NORCO) 7.5-325 MG per tablet Take 1 tablet by mouth every 6 hours as needed for Pain (take for severe pain) for up to 7 days.

## 2025-01-15 ENCOUNTER — TELEPHONE (OUTPATIENT)
Dept: PAIN MANAGEMENT | Age: 55
End: 2025-01-15

## 2025-01-15 DIAGNOSIS — G89.4 CHRONIC PAIN SYNDROME: ICD-10-CM

## 2025-01-15 DIAGNOSIS — M25.572 CHRONIC PAIN OF LEFT ANKLE: ICD-10-CM

## 2025-01-15 DIAGNOSIS — G89.29 CHRONIC BILATERAL LOW BACK PAIN WITH BILATERAL SCIATICA: ICD-10-CM

## 2025-01-15 DIAGNOSIS — G89.29 CHRONIC PAIN OF LEFT ANKLE: ICD-10-CM

## 2025-01-15 DIAGNOSIS — M54.42 CHRONIC BILATERAL LOW BACK PAIN WITH BILATERAL SCIATICA: ICD-10-CM

## 2025-01-15 DIAGNOSIS — M54.41 CHRONIC BILATERAL LOW BACK PAIN WITH BILATERAL SCIATICA: ICD-10-CM

## 2025-01-15 DIAGNOSIS — M62.830 BACK SPASM: ICD-10-CM

## 2025-01-15 DIAGNOSIS — M47.817 LUMBOSACRAL SPONDYLOSIS WITHOUT MYELOPATHY: ICD-10-CM

## 2025-01-15 DIAGNOSIS — M54.2 CERVICALGIA: ICD-10-CM

## 2025-01-15 DIAGNOSIS — S22.080S COMPRESSION FRACTURE OF T12 VERTEBRA, SEQUELA: ICD-10-CM

## 2025-01-15 RX ORDER — HYDROCODONE BITARTRATE AND ACETAMINOPHEN 7.5; 325 MG/1; MG/1
1 TABLET ORAL EVERY 6 HOURS PRN
Qty: 84 TABLET | Refills: 0 | Status: SHIPPED | OUTPATIENT
Start: 2025-01-15 | End: 2025-02-05

## 2025-01-15 NOTE — TELEPHONE ENCOUNTER
Pt called and said [facility] accidentally only sent partial refill of   HYDROcodone-acetaminophen (NORCO) 7.5-325 MG per tablet  and pt is needing whole script. Pt has picked medication that was sent and is needing the rest sent in.    Please call pt when sent in    [facility] [address]  [phone]

## 2025-01-22 DIAGNOSIS — M47.817 LUMBOSACRAL SPONDYLOSIS WITHOUT MYELOPATHY: ICD-10-CM

## 2025-01-22 DIAGNOSIS — S22.080S COMPRESSION FRACTURE OF T12 VERTEBRA, SEQUELA: ICD-10-CM

## 2025-01-22 DIAGNOSIS — M54.42 CHRONIC BILATERAL LOW BACK PAIN WITH BILATERAL SCIATICA: ICD-10-CM

## 2025-01-22 DIAGNOSIS — M54.41 CHRONIC BILATERAL LOW BACK PAIN WITH BILATERAL SCIATICA: ICD-10-CM

## 2025-01-22 DIAGNOSIS — G89.29 CHRONIC BILATERAL LOW BACK PAIN WITH BILATERAL SCIATICA: ICD-10-CM

## 2025-01-22 DIAGNOSIS — G89.4 CHRONIC PAIN SYNDROME: ICD-10-CM

## 2025-01-23 RX ORDER — LIDOCAINE 36 MG/1
PATCH TOPICAL
Qty: 30 PATCH | Refills: 0 | OUTPATIENT
Start: 2025-01-23

## 2025-02-11 ENCOUNTER — OFFICE VISIT (OUTPATIENT)
Dept: PAIN MANAGEMENT | Age: 55
End: 2025-02-11

## 2025-02-11 VITALS
TEMPERATURE: 96.8 F | OXYGEN SATURATION: 99 % | DIASTOLIC BLOOD PRESSURE: 95 MMHG | SYSTOLIC BLOOD PRESSURE: 153 MMHG | HEART RATE: 89 BPM

## 2025-02-11 DIAGNOSIS — M54.41 CHRONIC BILATERAL LOW BACK PAIN WITH BILATERAL SCIATICA: ICD-10-CM

## 2025-02-11 DIAGNOSIS — S22.080S COMPRESSION FRACTURE OF T12 VERTEBRA, SEQUELA: ICD-10-CM

## 2025-02-11 DIAGNOSIS — M47.817 LUMBOSACRAL SPONDYLOSIS WITHOUT MYELOPATHY: ICD-10-CM

## 2025-02-11 DIAGNOSIS — M54.42 CHRONIC BILATERAL LOW BACK PAIN WITH BILATERAL SCIATICA: ICD-10-CM

## 2025-02-11 DIAGNOSIS — E66.09 CLASS 2 OBESITY DUE TO EXCESS CALORIES WITHOUT SERIOUS COMORBIDITY IN ADULT, UNSPECIFIED BMI: ICD-10-CM

## 2025-02-11 DIAGNOSIS — M25.572 CHRONIC PAIN OF LEFT ANKLE: ICD-10-CM

## 2025-02-11 DIAGNOSIS — M54.2 CERVICALGIA: ICD-10-CM

## 2025-02-11 DIAGNOSIS — G89.29 CHRONIC BILATERAL LOW BACK PAIN WITH BILATERAL SCIATICA: ICD-10-CM

## 2025-02-11 DIAGNOSIS — E66.812 CLASS 2 OBESITY DUE TO EXCESS CALORIES WITHOUT SERIOUS COMORBIDITY IN ADULT, UNSPECIFIED BMI: ICD-10-CM

## 2025-02-11 DIAGNOSIS — M62.830 BACK SPASM: ICD-10-CM

## 2025-02-11 DIAGNOSIS — G89.29 CHRONIC PAIN OF LEFT ANKLE: ICD-10-CM

## 2025-02-11 DIAGNOSIS — G89.4 CHRONIC PAIN SYNDROME: ICD-10-CM

## 2025-02-11 RX ORDER — HYDROCODONE BITARTRATE AND ACETAMINOPHEN 5; 325 MG/1; MG/1
1 TABLET ORAL EVERY 6 HOURS PRN
Qty: 112 TABLET | Refills: 0 | Status: SHIPPED | OUTPATIENT
Start: 2025-02-11 | End: 2025-02-12

## 2025-02-11 RX ORDER — IBUPROFEN 800 MG/1
800 TABLET, FILM COATED ORAL EVERY 8 HOURS PRN
Qty: 90 TABLET | Refills: 0 | Status: SHIPPED | OUTPATIENT
Start: 2025-02-11 | End: 2025-03-13

## 2025-02-11 RX ORDER — BACLOFEN 10 MG/1
10 TABLET ORAL 2 TIMES DAILY
Qty: 60 TABLET | Refills: 0 | Status: SHIPPED | OUTPATIENT
Start: 2025-02-11 | End: 2025-03-13

## 2025-02-11 NOTE — PROGRESS NOTES
tablet 0    ibuprofen (ADVIL;MOTRIN) 800 MG tablet Take 1 tablet by mouth every 8 hours as needed for Pain 90 tablet 0     Facility-Administered Medications Prior to Visit   Medication Dose Route Frequency Provider Last Rate Last Admin    triamcinolone acetonide (KENALOG-40) injection 40 mg  40 mg IntraMUSCular Once Kellen Shah, APRN - CNP           SOCIAL/FAMILY/PAST MEDICAL HISTORY: Mr. Power social, family and past medical history was reviewed.     REVIEW OF SYSTEMS:    Respiratory: Negative for apnea, chest tightness and shortness of breath or change in baseline breathing.    Gastrointestinal: Negative for nausea, vomiting, abdominal pain, diarrhea, constipation, blood in stool and abdominal distention.     PHYSICAL EXAM:   Nursing note and vitals reviewed. BP (!) 153/95   Pulse 89   Temp 96.8 °F (36 °C)   SpO2 99%   Constitutional: He appears well-developed and well-nourished. No acute distress.   Skin: Skin is warm and dry, good turgor. No rash noted. He is not diaphoretic.  Cardiovascular: Normal rate, regular rhythm, normal heart sounds, and does not have murmur.     Pulmonary/Chest: Effort normal. No respiratory distress. He does not have wheezes in the lung fields. He has no rales.     Neurological/Psychiatric:He is alert and oriented to person, place, and time. Coordination is  normal.  His mood isAppropriate and affect is Neutral/Euthymic(normal) .     Prescription pain medication monitoring:                  MEDD current = 20              ORT Score = 0 low risk              Other Risk factors - (mood) Stable              Date of Last Medication Agreement: 1/14/25              Date Naloxone prescribed: 1/14/25              UDT:                          Date of last UDT: 8/20/24                          Adverse report: No              OARRS:                          Checked today: Yes                          Adverse report: No    IMPRESSION:   1. Chronic pain syndrome    2. Compression

## 2025-02-12 DIAGNOSIS — G89.4 CHRONIC PAIN SYNDROME: ICD-10-CM

## 2025-02-12 DIAGNOSIS — M62.830 BACK SPASM: ICD-10-CM

## 2025-02-12 DIAGNOSIS — M54.42 CHRONIC BILATERAL LOW BACK PAIN WITH BILATERAL SCIATICA: ICD-10-CM

## 2025-02-12 DIAGNOSIS — G89.29 CHRONIC PAIN OF LEFT ANKLE: ICD-10-CM

## 2025-02-12 DIAGNOSIS — M47.817 LUMBOSACRAL SPONDYLOSIS WITHOUT MYELOPATHY: ICD-10-CM

## 2025-02-12 DIAGNOSIS — S22.080S COMPRESSION FRACTURE OF T12 VERTEBRA, SEQUELA: ICD-10-CM

## 2025-02-12 DIAGNOSIS — M54.2 CERVICALGIA: ICD-10-CM

## 2025-02-12 DIAGNOSIS — M25.572 CHRONIC PAIN OF LEFT ANKLE: ICD-10-CM

## 2025-02-12 DIAGNOSIS — M54.41 CHRONIC BILATERAL LOW BACK PAIN WITH BILATERAL SCIATICA: ICD-10-CM

## 2025-02-12 DIAGNOSIS — G89.29 CHRONIC BILATERAL LOW BACK PAIN WITH BILATERAL SCIATICA: ICD-10-CM

## 2025-02-12 RX ORDER — HYDROCODONE BITARTRATE AND ACETAMINOPHEN 7.5; 325 MG/1; MG/1
1 TABLET ORAL EVERY 6 HOURS PRN
Qty: 112 TABLET | Refills: 0 | Status: SHIPPED | OUTPATIENT
Start: 2025-02-12 | End: 2025-03-12

## 2025-02-12 NOTE — TELEPHONE ENCOUNTER
Pt called stating that medication sent to pharmacy was incorrect. RX sent to pharmacy was HYDROcodone-acetaminophen (NORCO) 5-325 MG per tablet.    Should have sent HYDROcodone-acetaminophen (NORCO) 7.5-325 MG per tablet     Pt said that pharmacy needed corrected by EOD. Informed pt I would request but we do ask for 24 to 48 hours to respond.

## 2025-02-13 RX ORDER — HYDROCODONE BITARTRATE AND ACETAMINOPHEN 7.5; 325 MG/1; MG/1
1 TABLET ORAL EVERY 6 HOURS PRN
Qty: 112 TABLET | Refills: 0 | OUTPATIENT
Start: 2025-02-13 | End: 2025-03-13

## 2025-03-11 ENCOUNTER — OFFICE VISIT (OUTPATIENT)
Dept: PAIN MANAGEMENT | Age: 55
End: 2025-03-11

## 2025-03-11 VITALS
OXYGEN SATURATION: 95 % | HEART RATE: 97 BPM | DIASTOLIC BLOOD PRESSURE: 96 MMHG | TEMPERATURE: 97 F | SYSTOLIC BLOOD PRESSURE: 150 MMHG

## 2025-03-11 DIAGNOSIS — G89.4 CHRONIC PAIN SYNDROME: ICD-10-CM

## 2025-03-11 DIAGNOSIS — G89.29 CHRONIC PAIN OF LEFT ANKLE: ICD-10-CM

## 2025-03-11 DIAGNOSIS — S22.080S COMPRESSION FRACTURE OF T12 VERTEBRA, SEQUELA: ICD-10-CM

## 2025-03-11 DIAGNOSIS — M47.817 LUMBOSACRAL SPONDYLOSIS WITHOUT MYELOPATHY: ICD-10-CM

## 2025-03-11 DIAGNOSIS — G89.29 CHRONIC BILATERAL LOW BACK PAIN WITH BILATERAL SCIATICA: ICD-10-CM

## 2025-03-11 DIAGNOSIS — M62.830 BACK SPASM: ICD-10-CM

## 2025-03-11 DIAGNOSIS — M25.572 CHRONIC PAIN OF LEFT ANKLE: ICD-10-CM

## 2025-03-11 DIAGNOSIS — M54.2 CERVICALGIA: ICD-10-CM

## 2025-03-11 DIAGNOSIS — M54.41 CHRONIC BILATERAL LOW BACK PAIN WITH BILATERAL SCIATICA: ICD-10-CM

## 2025-03-11 DIAGNOSIS — E66.09 CLASS 2 OBESITY DUE TO EXCESS CALORIES WITHOUT SERIOUS COMORBIDITY IN ADULT, UNSPECIFIED BMI: ICD-10-CM

## 2025-03-11 DIAGNOSIS — E66.812 CLASS 2 OBESITY DUE TO EXCESS CALORIES WITHOUT SERIOUS COMORBIDITY IN ADULT, UNSPECIFIED BMI: ICD-10-CM

## 2025-03-11 DIAGNOSIS — M54.42 CHRONIC BILATERAL LOW BACK PAIN WITH BILATERAL SCIATICA: ICD-10-CM

## 2025-03-11 RX ORDER — IBUPROFEN 800 MG/1
800 TABLET, FILM COATED ORAL EVERY 8 HOURS PRN
Qty: 90 TABLET | Refills: 0 | Status: SHIPPED | OUTPATIENT
Start: 2025-03-11 | End: 2025-04-10

## 2025-03-11 RX ORDER — HYDROCODONE BITARTRATE AND ACETAMINOPHEN 7.5; 325 MG/1; MG/1
1 TABLET ORAL EVERY 6 HOURS PRN
Qty: 112 TABLET | Refills: 0 | Status: SHIPPED | OUTPATIENT
Start: 2025-03-11 | End: 2025-04-08

## 2025-03-11 RX ORDER — BACLOFEN 10 MG/1
10 TABLET ORAL 2 TIMES DAILY
Qty: 60 TABLET | Refills: 0 | Status: SHIPPED | OUTPATIENT
Start: 2025-03-11 | End: 2025-04-10

## 2025-03-11 NOTE — PROGRESS NOTES
Yes: Norco 7. mg tabs orally q6h prn              Adjust dose of present analgesic: No              Switch analgesics: No              Add/Adjust concomitant therapy: No: continue with Motrin, Narcan, Neurontin, Lidocaine    I will continue his current medication regimen  which is part of the above treatment schedule. It has been helping with Mr. Power's chronic  medical problems which for this visit include:   Diagnoses of Chronic pain syndrome, Compression fracture of T12 vertebra, sequela, Lumbosacral spondylosis without myelopathy, Chronic bilateral low back pain with bilateral sciatica, Back spasm, Cervicalgia, Chronic pain of left ankle, and Class 2 obesity due to excess calories without serious comorbidity in adult, unspecified BMI were pertinent to this visit.   Risks and benefits of the medications and other alternative treatments  including no treatment were discussed with the patient.The common side effects of these medications were also explained to the patient.  Informed verbal consent was obtained.   Goals of current treatment regimen include improvement in pain, restoration of functioning- with focus on improvement in physical performance, general activity, work or disability,emotional distress, health care utilization and  decreased medication consumption. Will continue to monitor progress towards achieving/maintaining therapeutic goals with special emphasis on  1. Improvement in perceived interfernce  of pain with ADL's. Ability to do home exercises independently. Ability to do household chores indoor and/or outdoor work and social and leisure activities.Improve psychosocial and physical functioning. - he is showing progression towards this treatment goal with the current regimen.     He was advised against drinking alcohol with the narcotic pain medicines, advised against driving or handling machinery while adjusting the dose of medicines or if having cognitive  issues related to the

## 2025-04-08 ENCOUNTER — OFFICE VISIT (OUTPATIENT)
Dept: PAIN MANAGEMENT | Age: 55
End: 2025-04-08
Payer: COMMERCIAL

## 2025-04-08 VITALS
TEMPERATURE: 96.8 F | HEART RATE: 102 BPM | SYSTOLIC BLOOD PRESSURE: 140 MMHG | OXYGEN SATURATION: 97 % | DIASTOLIC BLOOD PRESSURE: 85 MMHG

## 2025-04-08 DIAGNOSIS — G89.29 CHRONIC PAIN OF LEFT ANKLE: ICD-10-CM

## 2025-04-08 DIAGNOSIS — M54.2 CERVICALGIA: ICD-10-CM

## 2025-04-08 DIAGNOSIS — S22.080S COMPRESSION FRACTURE OF T12 VERTEBRA, SEQUELA: ICD-10-CM

## 2025-04-08 DIAGNOSIS — M62.830 BACK SPASM: ICD-10-CM

## 2025-04-08 DIAGNOSIS — M54.42 CHRONIC BILATERAL LOW BACK PAIN WITH BILATERAL SCIATICA: ICD-10-CM

## 2025-04-08 DIAGNOSIS — E66.812 CLASS 2 OBESITY DUE TO EXCESS CALORIES WITHOUT SERIOUS COMORBIDITY IN ADULT, UNSPECIFIED BMI: ICD-10-CM

## 2025-04-08 DIAGNOSIS — E66.09 CLASS 2 OBESITY DUE TO EXCESS CALORIES WITHOUT SERIOUS COMORBIDITY IN ADULT, UNSPECIFIED BMI: ICD-10-CM

## 2025-04-08 DIAGNOSIS — M47.817 LUMBOSACRAL SPONDYLOSIS WITHOUT MYELOPATHY: ICD-10-CM

## 2025-04-08 DIAGNOSIS — G89.29 CHRONIC BILATERAL LOW BACK PAIN WITH BILATERAL SCIATICA: ICD-10-CM

## 2025-04-08 DIAGNOSIS — G89.4 CHRONIC PAIN SYNDROME: ICD-10-CM

## 2025-04-08 DIAGNOSIS — M54.41 CHRONIC BILATERAL LOW BACK PAIN WITH BILATERAL SCIATICA: ICD-10-CM

## 2025-04-08 DIAGNOSIS — M25.572 CHRONIC PAIN OF LEFT ANKLE: ICD-10-CM

## 2025-04-08 PROCEDURE — 4004F PT TOBACCO SCREEN RCVD TLK: CPT | Performed by: NURSE PRACTITIONER

## 2025-04-08 PROCEDURE — 99214 OFFICE O/P EST MOD 30 MIN: CPT | Performed by: NURSE PRACTITIONER

## 2025-04-08 PROCEDURE — 3017F COLORECTAL CA SCREEN DOC REV: CPT | Performed by: NURSE PRACTITIONER

## 2025-04-08 PROCEDURE — G8417 CALC BMI ABV UP PARAM F/U: HCPCS | Performed by: NURSE PRACTITIONER

## 2025-04-08 PROCEDURE — G8427 DOCREV CUR MEDS BY ELIG CLIN: HCPCS | Performed by: NURSE PRACTITIONER

## 2025-04-08 RX ORDER — HYDROCODONE BITARTRATE AND ACETAMINOPHEN 7.5; 325 MG/1; MG/1
1 TABLET ORAL EVERY 6 HOURS PRN
Qty: 112 TABLET | Refills: 0 | Status: SHIPPED | OUTPATIENT
Start: 2025-04-08 | End: 2025-05-06

## 2025-04-08 RX ORDER — BACLOFEN 10 MG/1
10 TABLET ORAL 2 TIMES DAILY
Qty: 60 TABLET | Refills: 0 | Status: SHIPPED | OUTPATIENT
Start: 2025-04-08 | End: 2025-05-08

## 2025-04-08 RX ORDER — IBUPROFEN 800 MG/1
800 TABLET, FILM COATED ORAL EVERY 8 HOURS PRN
Qty: 90 TABLET | Refills: 0 | Status: SHIPPED | OUTPATIENT
Start: 2025-04-08 | End: 2025-05-08

## 2025-04-08 RX ORDER — METHYLPREDNISOLONE 4 MG/1
TABLET ORAL
Qty: 1 KIT | Refills: 0 | Status: SHIPPED | OUTPATIENT
Start: 2025-04-08 | End: 2025-04-14

## 2025-04-08 NOTE — PROGRESS NOTES
Date of last UDT: 8/20/24                          Adverse report: No              OARRS:                          Checked today: Yes                          Adverse report: No     Overall Progress:                             PEG Score = 9                 Physical Therapy: (benefit %)last >2 years              Counseling/Psych: None              Injections:Yes in the office TPI              Patient compliance on plan of care: (Good,Fair,Poor) Good              Progress on current plan: (Good,Fair,Poor) Fair    IMPRESSION:   1. Chronic pain syndrome    2. Compression fracture of T12 vertebra, sequela    3. Lumbosacral spondylosis without myelopathy    4. Chronic bilateral low back pain with bilateral sciatica    5. Back spasm    6. Cervicalgia    7. Chronic pain of left ankle    8. Class 2 obesity due to excess calories without serious comorbidity in adult, unspecified BMI      PLAN:  Informed verbal consent was obtained:  Informed patient that opioid pain medications may not be phoned into the pharmacy or refilled without being seen  -Patient's opioid therapy will be maintained at current dose  -Home exercises/Sobeida exercises recommended  -Medrol dose pack  -CBT techniques- relaxation therapies such as biofeedback, mindfulness based stress reduction, imagery, cognitive restructuring, problem solving discussed with patient   -He was advised weight reduction, diet changes- 800-1200 jen diet, diet diary, exercising, nutritional  consult increased physical activity as tolerated   -Last UDS 8/15/24 consistent  -Return in about 4 weeks (around 5/6/2025).     Analgesic Plan:              Continue present regimen: Yes: Norco 7. mg tabs orally q6h prn              Adjust dose of present analgesic: No              Switch analgesics: No              Add/Adjust concomitant therapy: No: continue with Motrin, Narcan, Neurontin, Lidocaine    I will continue his current medication regimen  which is part of

## 2025-05-06 ENCOUNTER — TELEPHONE (OUTPATIENT)
Dept: PAIN MANAGEMENT | Age: 55
End: 2025-05-06

## 2025-05-06 DIAGNOSIS — M54.42 CHRONIC BILATERAL LOW BACK PAIN WITH BILATERAL SCIATICA: ICD-10-CM

## 2025-05-06 DIAGNOSIS — G89.29 CHRONIC PAIN OF LEFT ANKLE: ICD-10-CM

## 2025-05-06 DIAGNOSIS — M62.830 BACK SPASM: ICD-10-CM

## 2025-05-06 DIAGNOSIS — G89.4 CHRONIC PAIN SYNDROME: ICD-10-CM

## 2025-05-06 DIAGNOSIS — M25.572 CHRONIC PAIN OF LEFT ANKLE: ICD-10-CM

## 2025-05-06 DIAGNOSIS — S22.080S COMPRESSION FRACTURE OF T12 VERTEBRA, SEQUELA: ICD-10-CM

## 2025-05-06 DIAGNOSIS — M54.41 CHRONIC BILATERAL LOW BACK PAIN WITH BILATERAL SCIATICA: ICD-10-CM

## 2025-05-06 DIAGNOSIS — M54.2 CERVICALGIA: ICD-10-CM

## 2025-05-06 DIAGNOSIS — M47.817 LUMBOSACRAL SPONDYLOSIS WITHOUT MYELOPATHY: ICD-10-CM

## 2025-05-06 DIAGNOSIS — G89.29 CHRONIC BILATERAL LOW BACK PAIN WITH BILATERAL SCIATICA: ICD-10-CM

## 2025-05-06 RX ORDER — HYDROCODONE BITARTRATE AND ACETAMINOPHEN 7.5; 325 MG/1; MG/1
1 TABLET ORAL EVERY 6 HOURS PRN
Qty: 28 TABLET | Refills: 0 | Status: SHIPPED | OUTPATIENT
Start: 2025-05-06 | End: 2025-05-13 | Stop reason: SDUPTHER

## 2025-05-06 NOTE — TELEPHONE ENCOUNTER
Goal Outcome Evaluation:           Progress: no change  Outcome Evaluation: Up to chair x1, for less than 5 minutes c/o back pain, A-fib uncontrolled episode during dialysis, continued from 1156 to 1652 where she converted back to NSR without intervention, lopressor given x1, not effective, continuing to complain of abdominal/buttock pain, morphine given x2, effective                                Pt called stating his RX is due for Hydrocodone.    [facility],[address]    Pt would like a call back once sent.

## 2025-05-07 NOTE — TELEPHONE ENCOUNTER
This medication is APPROVED.  7/5/2023- 7/3/2024. Pharmacy has been notified via speaking w/Pierre. Thank you! Detail Level: Zone Detail Level: Detailed Detail Level: Generalized Detail Level: Simple

## 2025-05-13 ENCOUNTER — OFFICE VISIT (OUTPATIENT)
Dept: PAIN MANAGEMENT | Age: 55
End: 2025-05-13
Payer: COMMERCIAL

## 2025-05-13 VITALS
HEART RATE: 100 BPM | DIASTOLIC BLOOD PRESSURE: 96 MMHG | TEMPERATURE: 96.8 F | OXYGEN SATURATION: 94 % | SYSTOLIC BLOOD PRESSURE: 143 MMHG

## 2025-05-13 DIAGNOSIS — S22.080S COMPRESSION FRACTURE OF T12 VERTEBRA, SEQUELA: ICD-10-CM

## 2025-05-13 DIAGNOSIS — E66.812 CLASS 2 OBESITY DUE TO EXCESS CALORIES WITHOUT SERIOUS COMORBIDITY IN ADULT, UNSPECIFIED BMI: ICD-10-CM

## 2025-05-13 DIAGNOSIS — M62.830 BACK SPASM: ICD-10-CM

## 2025-05-13 DIAGNOSIS — M25.572 CHRONIC PAIN OF LEFT ANKLE: ICD-10-CM

## 2025-05-13 DIAGNOSIS — M47.817 LUMBOSACRAL SPONDYLOSIS WITHOUT MYELOPATHY: ICD-10-CM

## 2025-05-13 DIAGNOSIS — G89.4 CHRONIC PAIN SYNDROME: ICD-10-CM

## 2025-05-13 DIAGNOSIS — G89.29 CHRONIC BILATERAL LOW BACK PAIN WITH BILATERAL SCIATICA: ICD-10-CM

## 2025-05-13 DIAGNOSIS — G89.29 CHRONIC PAIN OF LEFT ANKLE: ICD-10-CM

## 2025-05-13 DIAGNOSIS — M54.2 CERVICALGIA: ICD-10-CM

## 2025-05-13 DIAGNOSIS — E66.09 CLASS 2 OBESITY DUE TO EXCESS CALORIES WITHOUT SERIOUS COMORBIDITY IN ADULT, UNSPECIFIED BMI: ICD-10-CM

## 2025-05-13 DIAGNOSIS — M54.42 CHRONIC BILATERAL LOW BACK PAIN WITH BILATERAL SCIATICA: ICD-10-CM

## 2025-05-13 DIAGNOSIS — M54.41 CHRONIC BILATERAL LOW BACK PAIN WITH BILATERAL SCIATICA: ICD-10-CM

## 2025-05-13 PROCEDURE — 99214 OFFICE O/P EST MOD 30 MIN: CPT | Performed by: NURSE PRACTITIONER

## 2025-05-13 PROCEDURE — 3017F COLORECTAL CA SCREEN DOC REV: CPT | Performed by: NURSE PRACTITIONER

## 2025-05-13 PROCEDURE — G8417 CALC BMI ABV UP PARAM F/U: HCPCS | Performed by: NURSE PRACTITIONER

## 2025-05-13 PROCEDURE — 4004F PT TOBACCO SCREEN RCVD TLK: CPT | Performed by: NURSE PRACTITIONER

## 2025-05-13 PROCEDURE — G8427 DOCREV CUR MEDS BY ELIG CLIN: HCPCS | Performed by: NURSE PRACTITIONER

## 2025-05-13 RX ORDER — HYDROCODONE BITARTRATE AND ACETAMINOPHEN 7.5; 325 MG/1; MG/1
1 TABLET ORAL EVERY 6 HOURS PRN
Qty: 112 TABLET | Refills: 0 | Status: SHIPPED | OUTPATIENT
Start: 2025-05-13 | End: 2025-06-10

## 2025-05-13 RX ORDER — IBUPROFEN 800 MG/1
800 TABLET, FILM COATED ORAL EVERY 8 HOURS PRN
Qty: 90 TABLET | Refills: 0 | Status: SHIPPED | OUTPATIENT
Start: 2025-05-13 | End: 2025-06-12

## 2025-05-13 RX ORDER — BACLOFEN 10 MG/1
10 TABLET ORAL 2 TIMES DAILY
Qty: 60 TABLET | Refills: 0 | Status: SHIPPED | OUTPATIENT
Start: 2025-05-13 | End: 2025-06-12

## 2025-05-13 NOTE — PROGRESS NOTES
Vlad Power III  1970  [id number]    HISTORY OF PRESENT ILLNESS: Mr. Power is a 55 y.o. male returns for a follow up visit for pain management  He has a diagnosis of   1. Chronic pain syndrome    2. Compression fracture of T12 vertebra, sequela    3. Lumbosacral spondylosis without myelopathy    4. Chronic bilateral low back pain with bilateral sciatica    5. Back spasm    6. Cervicalgia    7. Chronic pain of left ankle    8. Class 2 obesity due to excess calories without serious comorbidity in adult, unspecified BMI      As per Information Obtained from the PADT (Patient Assessment and Documentation Tool)    He complains of pain in the  down the spine, both arms, both legs, right foot  He rates the pain 7/10 and describes it as aching. Current treatment regimen has helped relieve about 40% of the pain.  He denies any side effects from the current pain regimen. Patient reports that since the last follow up visit the physical functioning is unchanged, family/social relationships are unchanged, mood is unchanged sleep patterns are unchanged, and that the overall functioning is worse.  Patient denies misusing/abusing his narcotic pain medications or using any illegal drugs.      Upon obtaining medical history from Mr. Power states that pain is manageable on current pain therapy. Takes the pain medications as prescribed. Mood/anxiety is stable. Sleep is fair with an average of 5-6 hours. Denies to having issues of constipation. Tolerating activities/house chores with moderate tenderness to the lower back. Working on smoking cessation    ALLERGIES: Patients list of allergies were reviewed     MEDICATIONS: Mr. Power list of medications were reviewed.His current medications are   Outpatient Medications Prior to Visit   Medication Sig Dispense Refill    naloxone (NARCAN) 4 MG/0.1ML LIQD nasal spray Use as directed 1 each 0    HYDROcodone-acetaminophen (NORCO) 7.5-325 MG per tablet Take 1 tablet by

## 2025-06-10 ENCOUNTER — OFFICE VISIT (OUTPATIENT)
Dept: PAIN MANAGEMENT | Age: 55
End: 2025-06-10

## 2025-06-10 VITALS — TEMPERATURE: 97.3 F | DIASTOLIC BLOOD PRESSURE: 81 MMHG | SYSTOLIC BLOOD PRESSURE: 123 MMHG | HEART RATE: 99 BPM

## 2025-06-10 DIAGNOSIS — G89.29 CHRONIC PAIN OF LEFT ANKLE: ICD-10-CM

## 2025-06-10 DIAGNOSIS — M54.41 CHRONIC BILATERAL LOW BACK PAIN WITH BILATERAL SCIATICA: ICD-10-CM

## 2025-06-10 DIAGNOSIS — M54.2 CERVICALGIA: ICD-10-CM

## 2025-06-10 DIAGNOSIS — G89.29 CHRONIC BILATERAL LOW BACK PAIN WITH BILATERAL SCIATICA: ICD-10-CM

## 2025-06-10 DIAGNOSIS — M54.42 CHRONIC BILATERAL LOW BACK PAIN WITH BILATERAL SCIATICA: ICD-10-CM

## 2025-06-10 DIAGNOSIS — S22.080S COMPRESSION FRACTURE OF T12 VERTEBRA, SEQUELA: ICD-10-CM

## 2025-06-10 DIAGNOSIS — E66.09 CLASS 1 OBESITY DUE TO EXCESS CALORIES WITHOUT SERIOUS COMORBIDITY IN ADULT, UNSPECIFIED BMI: ICD-10-CM

## 2025-06-10 DIAGNOSIS — M25.572 CHRONIC PAIN OF LEFT ANKLE: ICD-10-CM

## 2025-06-10 DIAGNOSIS — M62.830 BACK SPASM: ICD-10-CM

## 2025-06-10 DIAGNOSIS — G89.4 CHRONIC PAIN SYNDROME: ICD-10-CM

## 2025-06-10 DIAGNOSIS — E66.811 CLASS 1 OBESITY DUE TO EXCESS CALORIES WITHOUT SERIOUS COMORBIDITY IN ADULT, UNSPECIFIED BMI: ICD-10-CM

## 2025-06-10 DIAGNOSIS — M47.817 LUMBOSACRAL SPONDYLOSIS WITHOUT MYELOPATHY: ICD-10-CM

## 2025-06-10 RX ORDER — IBUPROFEN 800 MG/1
800 TABLET, FILM COATED ORAL EVERY 8 HOURS PRN
Qty: 90 TABLET | Refills: 0 | Status: SHIPPED | OUTPATIENT
Start: 2025-06-10 | End: 2025-07-10

## 2025-06-10 RX ORDER — HYDROCODONE BITARTRATE AND ACETAMINOPHEN 7.5; 325 MG/1; MG/1
1 TABLET ORAL EVERY 6 HOURS PRN
Qty: 112 TABLET | Refills: 0 | Status: SHIPPED | OUTPATIENT
Start: 2025-06-10 | End: 2025-07-08

## 2025-06-10 NOTE — PROGRESS NOTES
Vlad Power III  1970  [id number]    HISTORY OF PRESENT ILLNESS: Mr. Power is a 55 y.o. male returns for a follow up visit for pain management  He has a diagnosis of   1. Chronic pain syndrome    2. Lumbosacral spondylosis without myelopathy    3. Chronic bilateral low back pain with bilateral sciatica    4. Back spasm    5. Compression fracture of T12 vertebra, sequela    6. Cervicalgia    7. Chronic pain of left ankle    8. Class 1 obesity due to excess calories without serious comorbidity in adult, unspecified BMI      As per Information Obtained from the PADT (Patient Assessment and Documentation Tool)    He complains of pain in the Both shoulders,Whole back,Both legs He rates the pain 8/10 and describes it as burning. Current treatment regimen has helped relieve about 50% of the pain.  He denies any side effects from the current pain regimen. Patient reports that since the last follow up visit the physical functioning is unchanged, family/social relationships are unchanged, mood is unchanged sleep patterns are unchanged, and that the overall functioning is unchanged.  Patient denies misusing/abusing his narcotic pain medications or using any illegal drugs.      Upon obtaining medical history from Mr. Power   states that pain is manageable on current pain therapy. Takes the pain medications as prescribed. Mood/anxiety is stable. Sleep is fair with an average of 5-6 hours. Denies to having issues of constipation. Tolerating activities/house chores with moderate tenderness to the lower back. Working on smoking cessation    ALLERGIES: Patients list of allergies were reviewed     MEDICATIONS: Mr. Power list of medications were reviewed.His current medications are   Outpatient Medications Prior to Visit   Medication Sig Dispense Refill    baclofen (LIORESAL) 10 MG tablet Take 1 tablet by mouth 2 times daily 60 tablet 0    naloxone (NARCAN) 4 MG/0.1ML LIQD nasal spray Use as directed 1 each 0

## 2025-07-08 ENCOUNTER — OFFICE VISIT (OUTPATIENT)
Dept: PAIN MANAGEMENT | Age: 55
End: 2025-07-08

## 2025-07-08 ENCOUNTER — TELEPHONE (OUTPATIENT)
Dept: PAIN MANAGEMENT | Age: 55
End: 2025-07-08

## 2025-07-08 VITALS
DIASTOLIC BLOOD PRESSURE: 87 MMHG | SYSTOLIC BLOOD PRESSURE: 136 MMHG | TEMPERATURE: 96.6 F | HEART RATE: 95 BPM | OXYGEN SATURATION: 96 %

## 2025-07-08 DIAGNOSIS — S22.080S COMPRESSION FRACTURE OF T12 VERTEBRA, SEQUELA: ICD-10-CM

## 2025-07-08 DIAGNOSIS — G89.29 CHRONIC PAIN OF LEFT ANKLE: ICD-10-CM

## 2025-07-08 DIAGNOSIS — E66.811 CLASS 1 OBESITY DUE TO EXCESS CALORIES WITHOUT SERIOUS COMORBIDITY IN ADULT, UNSPECIFIED BMI: ICD-10-CM

## 2025-07-08 DIAGNOSIS — G89.29 CHRONIC BILATERAL LOW BACK PAIN WITH BILATERAL SCIATICA: ICD-10-CM

## 2025-07-08 DIAGNOSIS — M62.830 BACK SPASM: ICD-10-CM

## 2025-07-08 DIAGNOSIS — M54.42 CHRONIC BILATERAL LOW BACK PAIN WITH BILATERAL SCIATICA: ICD-10-CM

## 2025-07-08 DIAGNOSIS — M47.817 LUMBOSACRAL SPONDYLOSIS WITHOUT MYELOPATHY: ICD-10-CM

## 2025-07-08 DIAGNOSIS — M54.2 CERVICALGIA: ICD-10-CM

## 2025-07-08 DIAGNOSIS — M25.572 CHRONIC PAIN OF LEFT ANKLE: ICD-10-CM

## 2025-07-08 DIAGNOSIS — E66.09 CLASS 1 OBESITY DUE TO EXCESS CALORIES WITHOUT SERIOUS COMORBIDITY IN ADULT, UNSPECIFIED BMI: ICD-10-CM

## 2025-07-08 DIAGNOSIS — M54.41 CHRONIC BILATERAL LOW BACK PAIN WITH BILATERAL SCIATICA: ICD-10-CM

## 2025-07-08 DIAGNOSIS — G89.4 CHRONIC PAIN SYNDROME: ICD-10-CM

## 2025-07-08 RX ORDER — IBUPROFEN 800 MG/1
800 TABLET, FILM COATED ORAL EVERY 8 HOURS PRN
Qty: 90 TABLET | Refills: 0 | Status: SHIPPED | OUTPATIENT
Start: 2025-07-08 | End: 2025-08-07

## 2025-07-08 RX ORDER — BACLOFEN 10 MG/1
10 TABLET ORAL 2 TIMES DAILY
Qty: 60 TABLET | Refills: 0 | Status: SHIPPED | OUTPATIENT
Start: 2025-07-08 | End: 2025-08-07

## 2025-07-08 RX ORDER — HYDROCODONE BITARTRATE AND ACETAMINOPHEN 7.5; 325 MG/1; MG/1
1 TABLET ORAL EVERY 6 HOURS PRN
Qty: 112 TABLET | Refills: 0 | Status: SHIPPED | OUTPATIENT
Start: 2025-07-08 | End: 2025-08-05

## 2025-07-08 RX ORDER — METHYLPREDNISOLONE 4 MG/1
TABLET ORAL
Qty: 1 KIT | Refills: 0 | Status: SHIPPED | OUTPATIENT
Start: 2025-07-08 | End: 2025-07-14

## 2025-07-08 NOTE — PROGRESS NOTES
last UDT: 8/20/24                          Adverse report: No              OARRS:                          Checked today: Yes                          Adverse report: No     Overall Progress:                             PEG Score = 8                 Physical Therapy: (benefit %)last >2 years              Counseling/Psych: None              Injections:Yes in the office TPI              Patient compliance on plan of care: (Good,Fair,Poor) Good              Progress on current plan: (Good,Fair,Poor) Fair    IMPRESSION:   1. Chronic pain syndrome    2. Lumbosacral spondylosis without myelopathy    3. Chronic bilateral low back pain with bilateral sciatica    4. Compression fracture of T12 vertebra, sequela    5. Back spasm    6. Cervicalgia    7. Chronic pain of left ankle    8. Class 1 obesity due to excess calories without serious comorbidity in adult, unspecified BMI      PLAN:  Informed verbal consent was obtained:  Informed patient that opioid pain medications may not be phoned into the pharmacy or refilled without being seen.  -Patient's opioid therapy will be maintained at current dose  -Medrol dose pack  -Home exercises/Sobeida exercises recommended  -CBT techniques- relaxation therapies such as biofeedback, mindfulness based stress reduction, imagery, cognitive restructuring, problem solving discussed with patient   -He was advised weight reduction, diet changes- 800-1200 jen diet, diet diary, exercising, nutritional  consult increased physical activity as tolerated   -Last UDS 8/20/25 consistent  -Return in about 4 weeks (around 8/5/2025).   -OARRS record was obtained and reviewed  for the last one year and no indicators of drug misuse  were found. Any other controlled substance prescriptions  seen on the record have been accounted for, I am aware of the patient receiving these medications. . OARRS record will be rechecked as part of office protocol.      Analgesic Plan:              Continue present regimen:

## 2025-08-05 ENCOUNTER — OFFICE VISIT (OUTPATIENT)
Dept: PAIN MANAGEMENT | Age: 55
End: 2025-08-05

## 2025-08-05 VITALS
BODY MASS INDEX: 32.71 KG/M2 | DIASTOLIC BLOOD PRESSURE: 75 MMHG | SYSTOLIC BLOOD PRESSURE: 136 MMHG | WEIGHT: 228 LBS | OXYGEN SATURATION: 99 % | HEART RATE: 98 BPM

## 2025-08-05 DIAGNOSIS — M47.817 LUMBOSACRAL SPONDYLOSIS WITHOUT MYELOPATHY: ICD-10-CM

## 2025-08-05 DIAGNOSIS — M54.42 CHRONIC BILATERAL LOW BACK PAIN WITH BILATERAL SCIATICA: ICD-10-CM

## 2025-08-05 DIAGNOSIS — M54.2 CERVICALGIA: ICD-10-CM

## 2025-08-05 DIAGNOSIS — G89.4 CHRONIC PAIN SYNDROME: ICD-10-CM

## 2025-08-05 DIAGNOSIS — G89.29 CHRONIC PAIN OF LEFT ANKLE: ICD-10-CM

## 2025-08-05 DIAGNOSIS — G89.29 CHRONIC BILATERAL LOW BACK PAIN WITH BILATERAL SCIATICA: ICD-10-CM

## 2025-08-05 DIAGNOSIS — M62.830 BACK SPASM: ICD-10-CM

## 2025-08-05 DIAGNOSIS — S22.080S COMPRESSION FRACTURE OF T12 VERTEBRA, SEQUELA: ICD-10-CM

## 2025-08-05 DIAGNOSIS — M25.572 CHRONIC PAIN OF LEFT ANKLE: ICD-10-CM

## 2025-08-05 DIAGNOSIS — E66.09 CLASS 1 OBESITY DUE TO EXCESS CALORIES WITHOUT SERIOUS COMORBIDITY IN ADULT, UNSPECIFIED BMI: ICD-10-CM

## 2025-08-05 DIAGNOSIS — E66.811 CLASS 1 OBESITY DUE TO EXCESS CALORIES WITHOUT SERIOUS COMORBIDITY IN ADULT, UNSPECIFIED BMI: ICD-10-CM

## 2025-08-05 DIAGNOSIS — M54.41 CHRONIC BILATERAL LOW BACK PAIN WITH BILATERAL SCIATICA: ICD-10-CM

## 2025-08-05 DIAGNOSIS — Z51.81 ENCOUNTER FOR THERAPEUTIC DRUG MONITORING: ICD-10-CM

## 2025-08-05 RX ORDER — HYDROCODONE BITARTRATE AND ACETAMINOPHEN 7.5; 325 MG/1; MG/1
1 TABLET ORAL EVERY 6 HOURS PRN
Qty: 112 TABLET | Refills: 0 | Status: SHIPPED | OUTPATIENT
Start: 2025-08-05 | End: 2025-09-02

## 2025-08-05 RX ORDER — IBUPROFEN 800 MG/1
800 TABLET, FILM COATED ORAL EVERY 8 HOURS PRN
Qty: 90 TABLET | Refills: 0 | Status: SHIPPED | OUTPATIENT
Start: 2025-08-05 | End: 2025-09-04

## 2025-08-05 RX ORDER — BACLOFEN 10 MG/1
10 TABLET ORAL 2 TIMES DAILY
Qty: 60 TABLET | Refills: 0 | Status: SHIPPED | OUTPATIENT
Start: 2025-08-05 | End: 2025-09-04

## 2025-08-08 LAB
1,3 CHLOROPHENYL PIPERAZINE, URINE: NOT DETECTED
6-MONOACETYLMORPHINE: NEGATIVE NG/ML
7-AMINOCLONAZEPAM/CREATININE URINE: NOT DETECTED NG/MG CREAT
8-HYDROXYAMOXAPINE, URINE: NOT DETECTED
8-HYDROXYLOXAPINE, URINE: NOT DETECTED
ACETAMINOPHEN, URINE: NORMAL UG/ML
ACETAMINOPHEN, URINE: PRESENT
ALPHA HYDROXYALPRAZOLAM/CREATININE URINE: NOT DETECTED NG/MG CREAT
ALPHA HYDROXYTRIAZOLAM/CREAT UR CFM: NOT DETECTED NG/MG CREAT
ALPHA-HYDROXYMIDAZOLAM, URINE: NOT DETECTED NG/MG CREAT
ALPRAZOLAM/CREATININE URINE: NOT DETECTED NG/MG CREAT
AMINO CHLOROPYRIDINE, URINE: NOT DETECTED
AMITRIPTYLINE: NOT DETECTED
AMOXAPINE, URINE: NOT DETECTED
AMPHETAMINE SCREEN URINE: NEGATIVE NG/ML
ANALGESICS SCREEN URINE: NORMAL
ANTIDEPRESSANTS SCREEN URINE: NEGATIVE
ANTIPSYCHOTICS SCREEN URINE: NEGATIVE
ARIPIPRAZOLE, URINE: NOT DETECTED
ASENAPINE, URINE: NOT DETECTED
BACLOFEN, URINE: NOT DETECTED
BARBITURATE SCREEN URINE: NEGATIVE NG/ML
BENZODIAZEPINE SCREEN, URINE: NEGATIVE
BUPRENORPHINE URINE: NEGATIVE
BUPRENORPHINE/CREATININE URINE: NOT DETECTED NG/MG CREAT
BUPROPION, URINE: NOT DETECTED
CANNABINOID SCREEN URINE: NEGATIVE NG/ML
CARBAMAZEPINE, URINE: NOT DETECTED
CARISOPRODOL, URINE: NOT DETECTED
CHLORPROMAZINE, URINE: NOT DETECTED
CITALOPRAM, URINE: NOT DETECTED
CLOBAZAM, URINE: NOT DETECTED
CLOMIPRAMINE, URINE: NOT DETECTED
CLONAZEPAM/CREATININE URINE: NOT DETECTED NG/MG CREAT
CLOZAPINE, URINE: NOT DETECTED
COCAINE METABOLITE SCREEN URINE: NEGATIVE NG/ML
CODEINE, URINE: NOT DETECTED NG/MG CREAT
CREATININE URINE: 26 MG/DL
CYCLOBENZAPRINE, URINE: NOT DETECTED
DESALKYLFLURAZEPAM/CREATININE URINE: NOT DETECTED NG/MG CREAT
DESIPRAMINE: NOT DETECTED
DESMETHYLCITALOPRAM, URINE: NOT DETECTED
DESMETHYLCLOMIPRAMINE, URINE: NOT DETECTED
DESMETHYLCLOZAPINE, URINE: NOT DETECTED
DESMETHYLCYCLOBENZAPRINE, URINE: NOT DETECTED
DESMETHYLDOXEPIN, URINE: NOT DETECTED
DESMETHYLFLUNITRAZEPAM, URINE: NOT DETECTED NG/MG CREAT
DESMETHYLSERTRALINE, URINE: NOT DETECTED
DESMETHYLVENLAFAXINE, URINE: NOT DETECTED
DIAZEPAM/CREATININE URINE: NOT DETECTED NG/MG CREAT
DIHYDROCODEINE/CREATININE URINE: 308 NG/MG CREAT
DOXEPIN, URINE: NOT DETECTED
DULOXETINE, URINE: NOT DETECTED
ETHANOL: NEGATIVE NG/ML (ref 0–0.08)
EZOGABINE, URINE: NOT DETECTED
FENTANYL / ANALOGUES SCREEN URINE: NEGATIVE
FENTANYL/CREATININE URINE: NOT DETECTED NG/MG CREAT
FLUNITRAZEPAM, URINE: NOT DETECTED NG/MG CREAT
FLUOXETINE, URINE: NOT DETECTED
FLUPHENAZINE, URINE: NOT DETECTED
FLUVOXAMINE, URINE: NOT DETECTED
GABAPENTIN: NOT DETECTED
HALOPERIDOL, URINE: NOT DETECTED
HYDROCODONE GC/MS CONF: 2238 NG/MG CREAT
HYDROMORPHONE GC/MS CONF: 1131 NG/MG CREAT
HYDROXYBUPROPION, URINE: NOT DETECTED
ILOPERIDONE, URINE: NOT DETECTED
IMIPRAMINE, URINE: NOT DETECTED
KETAMINE, URINE: NOT DETECTED
LABORATORY REPORT: NORMAL
LAMOTRIGINE, URINE: NOT DETECTED
LEVETIRACETAM, URINE: NOT DETECTED
LORAZEPAM/CREATININE URINE: NOT DETECTED NG/MG CREAT
LOXAPINE, URINE: NOT DETECTED
LURASIDONE, URINE: NOT DETECTED
MAPROTILINE, URINE: NOT DETECTED
MEPERIDINE: NEGATIVE NG/ML
MEPROBAMATE, URINE: NOT DETECTED
MESORIDAZINE, URINE: NOT DETECTED
METAXALONE, URINE: NOT DETECTED
METHADONE AND METABOLITES, URINE: NEGATIVE NG/ML
METHADONE SCREEN, URINE: NEGATIVE NG/ML
METHOCARBAMOL, URINE: NOT DETECTED
METHYLPHENIDATE, URINE: NOT DETECTED
MIDAZOLAM/CREATININE URINE: NOT DETECTED NG/MG CREAT
MIRTAZAPINE, URINE: NOT DETECTED
MOLINDONE, URINE: NOT DETECTED
MORPHINE GC/MS CONF: NOT DETECTED NG/MG CREAT
MUSCLE RELAXANTS SCREEN URINE: NEGATIVE
NEFAZODONE, URINE: NOT DETECTED
NORBUPRENORPHINE/CREATININE URINE: NOT DETECTED NG/MG CREAT
NORCODEINE/CREATININE URINE: NOT DETECTED NG/MG CREAT
NORDIAZEPAM/CREATININE URINE: NOT DETECTED NG/MG CREAT
NORFENTANYL/CREATININE URINE: NOT DETECTED NG/MG CREAT
NORFLUOXETINE, URINE: NOT DETECTED
NORHYDROCODONE (LCMSMS): 1792 NG/MG CREAT
NORKETAMINE, URINE: NOT DETECTED
NORMORPHINE URINE: NOT DETECTED NG/MG CREAT
NORTRIPTYLINE: NOT DETECTED
OLANZAPINE, URINE: NOT DETECTED
OPIATE SCREEN URINE: NORMAL NG/ML
OPIATES: NORMAL
ORPHENADRINE, URINE: NOT DETECTED
OTHER HALLUCINOGENS SCREEN URINE: NEGATIVE
OXAZEPAM/CREATININE URINE: NOT DETECTED NG/MG CREAT
OXCARBAZEPINE MHD, URINE: NOT DETECTED
OXYCODONE SCREEN URINE: NEGATIVE NG/ML
PAROXETINE, URINE: NOT DETECTED
PERPHENAZINE, URINE: NOT DETECTED
PHENCYCLIDINE SCREEN URINE: NEGATIVE NG/ML
PHENYTOIN, URINE: NOT DETECTED
PIMOZIDE, URINE: NOT DETECTED
PREGABALIN, URINE: NOT DETECTED
PREGABALIN: NEGATIVE
PRESCRIBED MEDICATIONS: NORMAL
PRIMIDONE, URINE: NOT DETECTED
PROCHLORPERAZINE, URINE: NOT DETECTED
PROPOXYPHENE: NEGATIVE NG/ML
PROTRIPTYLINE, URINE: NOT DETECTED
QUETIAPINE, URINE: NOT DETECTED
RISPERIDONE, URINE: NOT DETECTED
RITALINIC ACID, UR: NOT DETECTED
RUFINAMIDE, URINE: NOT DETECTED
SEDATIVES/HYPNOTICS SCREEN URINE: NEGATIVE
SERTRALINE, URINE: NOT DETECTED
SYMPATHOMIMETICS SCREEN URINE: NEGATIVE
TAPENTADOL SCREEN URINE: NEGATIVE NG/ML
TEMAZEPAM/CREATININE URINE: NOT DETECTED NG/MG CREAT
THIORIDAZINE, URINE: NOT DETECTED
THIOTHIXENE, URINE: NOT DETECTED
TIAGABINE, URINE: NOT DETECTED
TIZANIDINE, URINE: NOT DETECTED
TOPIRAMATE, URINE: NOT DETECTED
TRAMADOL SCREEN URINE: NEGATIVE NG/ML
TRAZODONE, URINE: NOT DETECTED
TRIFLUOPERAZINE, URINE: NOT DETECTED
TRIMIPRAMINE, URINE: NOT DETECTED
VENLAFAXINE, URINE: NOT DETECTED
VILAZODONE, URINE: NOT DETECTED
ZALEPLON, URINE: NOT DETECTED
ZIPRASIDONE, URINE: NOT DETECTED
ZOLPIDEM ACID, URINE: NOT DETECTED
ZOLPIDEM, URINE: NOT DETECTED
ZONISAMIDE, URINE: NOT DETECTED
ZOPICLONE/ESZOPICLONE, URINE: NOT DETECTED

## 2025-09-02 ENCOUNTER — OFFICE VISIT (OUTPATIENT)
Dept: PAIN MANAGEMENT | Age: 55
End: 2025-09-02
Payer: COMMERCIAL

## 2025-09-02 VITALS
WEIGHT: 228 LBS | SYSTOLIC BLOOD PRESSURE: 126 MMHG | DIASTOLIC BLOOD PRESSURE: 70 MMHG | HEART RATE: 71 BPM | OXYGEN SATURATION: 98 % | BODY MASS INDEX: 32.71 KG/M2

## 2025-09-02 DIAGNOSIS — M54.41 CHRONIC BILATERAL LOW BACK PAIN WITH BILATERAL SCIATICA: ICD-10-CM

## 2025-09-02 DIAGNOSIS — M54.42 CHRONIC BILATERAL LOW BACK PAIN WITH BILATERAL SCIATICA: ICD-10-CM

## 2025-09-02 DIAGNOSIS — M62.830 BACK SPASM: ICD-10-CM

## 2025-09-02 DIAGNOSIS — M25.572 CHRONIC PAIN OF LEFT ANKLE: ICD-10-CM

## 2025-09-02 DIAGNOSIS — E66.09 CLASS 1 OBESITY DUE TO EXCESS CALORIES WITHOUT SERIOUS COMORBIDITY IN ADULT, UNSPECIFIED BMI: ICD-10-CM

## 2025-09-02 DIAGNOSIS — S22.080S COMPRESSION FRACTURE OF T12 VERTEBRA, SEQUELA: ICD-10-CM

## 2025-09-02 DIAGNOSIS — G89.4 CHRONIC PAIN SYNDROME: ICD-10-CM

## 2025-09-02 DIAGNOSIS — M54.2 CERVICALGIA: ICD-10-CM

## 2025-09-02 DIAGNOSIS — G89.29 CHRONIC BILATERAL LOW BACK PAIN WITH BILATERAL SCIATICA: ICD-10-CM

## 2025-09-02 DIAGNOSIS — M47.817 LUMBOSACRAL SPONDYLOSIS WITHOUT MYELOPATHY: ICD-10-CM

## 2025-09-02 DIAGNOSIS — G89.29 CHRONIC PAIN OF LEFT ANKLE: ICD-10-CM

## 2025-09-02 DIAGNOSIS — E66.811 CLASS 1 OBESITY DUE TO EXCESS CALORIES WITHOUT SERIOUS COMORBIDITY IN ADULT, UNSPECIFIED BMI: ICD-10-CM

## 2025-09-02 PROCEDURE — G8417 CALC BMI ABV UP PARAM F/U: HCPCS | Performed by: NURSE PRACTITIONER

## 2025-09-02 PROCEDURE — G8427 DOCREV CUR MEDS BY ELIG CLIN: HCPCS | Performed by: NURSE PRACTITIONER

## 2025-09-02 PROCEDURE — 99214 OFFICE O/P EST MOD 30 MIN: CPT | Performed by: NURSE PRACTITIONER

## 2025-09-02 PROCEDURE — 3017F COLORECTAL CA SCREEN DOC REV: CPT | Performed by: NURSE PRACTITIONER

## 2025-09-02 PROCEDURE — 4004F PT TOBACCO SCREEN RCVD TLK: CPT | Performed by: NURSE PRACTITIONER

## 2025-09-02 RX ORDER — HYDROCODONE BITARTRATE AND ACETAMINOPHEN 7.5; 325 MG/1; MG/1
1 TABLET ORAL EVERY 6 HOURS PRN
Qty: 112 TABLET | Refills: 0 | Status: SHIPPED | OUTPATIENT
Start: 2025-09-02 | End: 2025-09-30

## 2025-09-02 RX ORDER — IBUPROFEN 800 MG/1
800 TABLET, FILM COATED ORAL EVERY 8 HOURS PRN
Qty: 90 TABLET | Refills: 0 | Status: SHIPPED | OUTPATIENT
Start: 2025-09-02 | End: 2025-10-02

## 2025-09-02 RX ORDER — BACLOFEN 10 MG/1
10 TABLET ORAL 2 TIMES DAILY
Qty: 60 TABLET | Refills: 0 | Status: SHIPPED | OUTPATIENT
Start: 2025-09-02 | End: 2025-10-02

## (undated) DEVICE — GLOVE ORANGE PI 8   MSG9080

## (undated) DEVICE — MEDIA CONTRAST RX ISOVUE-300 61% 30ML VIALS

## (undated) DEVICE — PORT VLV 2 W NDL FREE SMRTSITE

## (undated) DEVICE — CHLORAPREP 26ML ORANGE

## (undated) DEVICE — STANDARD HYPODERMIC NEEDLE,POLYPROPYLENE HUB: Brand: MONOJECT

## (undated) DEVICE — Device: Brand: JELCO

## (undated) DEVICE — SYRINGE MED 10ML LUERLOCK TIP W/O SFTY DISP

## (undated) DEVICE — STERILE POLYISOPRENE POWDER-FREE SURGICAL GLOVES: Brand: PROTEXIS

## (undated) DEVICE — NEEDLE SPNL 22GA L5IN BLK HUB S STL W/ QNCKE PNT W/OUT